# Patient Record
Sex: FEMALE | Race: BLACK OR AFRICAN AMERICAN | NOT HISPANIC OR LATINO
[De-identification: names, ages, dates, MRNs, and addresses within clinical notes are randomized per-mention and may not be internally consistent; named-entity substitution may affect disease eponyms.]

---

## 2017-01-03 ENCOUNTER — APPOINTMENT (OUTPATIENT)
Dept: SURGERY | Facility: CLINIC | Age: 62
End: 2017-01-03

## 2017-01-03 VITALS
TEMPERATURE: 98 F | HEART RATE: 72 BPM | OXYGEN SATURATION: 97 % | SYSTOLIC BLOOD PRESSURE: 132 MMHG | RESPIRATION RATE: 15 BRPM | DIASTOLIC BLOOD PRESSURE: 82 MMHG

## 2017-01-03 DIAGNOSIS — K80.20 CALCULUS OF GALLBLADDER W/OUT CHOLECYSTITIS W/OUT OBSTRUCTION: ICD-10-CM

## 2017-01-03 DIAGNOSIS — Z90.49 ACQUIRED ABSENCE OF OTHER SPECIFIED PARTS OF DIGESTIVE TRACT: ICD-10-CM

## 2017-01-13 ENCOUNTER — APPOINTMENT (OUTPATIENT)
Dept: SURGERY | Facility: CLINIC | Age: 62
End: 2017-01-13

## 2017-01-13 VITALS
TEMPERATURE: 98.4 F | SYSTOLIC BLOOD PRESSURE: 144 MMHG | RESPIRATION RATE: 16 BRPM | HEART RATE: 72 BPM | OXYGEN SATURATION: 97 % | DIASTOLIC BLOOD PRESSURE: 87 MMHG

## 2017-01-13 DIAGNOSIS — Z87.19 PERSONAL HISTORY OF OTHER DISEASES OF THE DIGESTIVE SYSTEM: ICD-10-CM

## 2017-01-13 RX ORDER — OXYCODONE AND ACETAMINOPHEN 10; 325 MG/1; MG/1
10-325 TABLET ORAL
Refills: 0 | Status: DISCONTINUED | COMMUNITY
End: 2017-01-13

## 2017-01-13 RX ORDER — AMOXICILLIN AND CLAVULANATE POTASSIUM 875; 125 MG/1; 1/1
875-125 TABLET, FILM COATED ORAL
Refills: 0 | Status: DISCONTINUED | COMMUNITY
End: 2017-01-13

## 2017-01-20 ENCOUNTER — CHART COPY (OUTPATIENT)
Age: 62
End: 2017-01-20

## 2017-01-23 ENCOUNTER — APPOINTMENT (OUTPATIENT)
Dept: MRI IMAGING | Facility: CLINIC | Age: 62
End: 2017-01-23

## 2017-01-23 ENCOUNTER — CHART COPY (OUTPATIENT)
Age: 62
End: 2017-01-23

## 2017-01-25 ENCOUNTER — OUTPATIENT (OUTPATIENT)
Dept: OUTPATIENT SERVICES | Facility: HOSPITAL | Age: 62
LOS: 1 days | End: 2017-01-25
Payer: COMMERCIAL

## 2017-01-25 ENCOUNTER — APPOINTMENT (OUTPATIENT)
Dept: MRI IMAGING | Facility: CLINIC | Age: 62
End: 2017-01-25

## 2017-01-25 DIAGNOSIS — Z98.2 PRESENCE OF CEREBROSPINAL FLUID DRAINAGE DEVICE: Chronic | ICD-10-CM

## 2017-01-25 DIAGNOSIS — Z98.890 OTHER SPECIFIED POSTPROCEDURAL STATES: Chronic | ICD-10-CM

## 2017-01-25 DIAGNOSIS — C23 MALIGNANT NEOPLASM OF GALLBLADDER: ICD-10-CM

## 2017-01-25 PROCEDURE — 70250 X-RAY EXAM OF SKULL: CPT

## 2017-01-25 PROCEDURE — 82565 ASSAY OF CREATININE: CPT

## 2017-01-25 PROCEDURE — 74183 MRI ABD W/O CNTR FLWD CNTR: CPT

## 2017-01-25 PROCEDURE — A9585: CPT

## 2017-01-26 ENCOUNTER — APPOINTMENT (OUTPATIENT)
Dept: SURGERY | Facility: CLINIC | Age: 62
End: 2017-01-26

## 2017-01-31 ENCOUNTER — APPOINTMENT (OUTPATIENT)
Dept: SURGERY | Facility: CLINIC | Age: 62
End: 2017-01-31

## 2017-01-31 VITALS
DIASTOLIC BLOOD PRESSURE: 90 MMHG | SYSTOLIC BLOOD PRESSURE: 159 MMHG | HEART RATE: 69 BPM | BODY MASS INDEX: 33.66 KG/M2 | TEMPERATURE: 98 F | HEIGHT: 63 IN | WEIGHT: 190 LBS

## 2017-02-02 ENCOUNTER — OUTPATIENT (OUTPATIENT)
Dept: OUTPATIENT SERVICES | Facility: HOSPITAL | Age: 62
LOS: 1 days | End: 2017-02-02
Payer: COMMERCIAL

## 2017-02-02 VITALS
SYSTOLIC BLOOD PRESSURE: 138 MMHG | WEIGHT: 195.99 LBS | DIASTOLIC BLOOD PRESSURE: 95 MMHG | OXYGEN SATURATION: 98 % | TEMPERATURE: 98 F | HEART RATE: 69 BPM | HEIGHT: 63 IN | RESPIRATION RATE: 14 BRPM

## 2017-02-02 DIAGNOSIS — Z98.2 PRESENCE OF CEREBROSPINAL FLUID DRAINAGE DEVICE: Chronic | ICD-10-CM

## 2017-02-02 DIAGNOSIS — C23 MALIGNANT NEOPLASM OF GALLBLADDER: ICD-10-CM

## 2017-02-02 DIAGNOSIS — Z90.49 ACQUIRED ABSENCE OF OTHER SPECIFIED PARTS OF DIGESTIVE TRACT: Chronic | ICD-10-CM

## 2017-02-02 DIAGNOSIS — Z01.818 ENCOUNTER FOR OTHER PREPROCEDURAL EXAMINATION: ICD-10-CM

## 2017-02-02 DIAGNOSIS — Z98.890 OTHER SPECIFIED POSTPROCEDURAL STATES: Chronic | ICD-10-CM

## 2017-02-02 LAB
BLD GP AB SCN SERPL QL: NEGATIVE — SIGNIFICANT CHANGE UP
HCT VFR BLD CALC: 41.4 % — SIGNIFICANT CHANGE UP (ref 34.5–45)
HGB BLD-MCNC: 13.7 G/DL — SIGNIFICANT CHANGE UP (ref 11.5–15.5)
MCHC RBC-ENTMCNC: 30.2 PG — SIGNIFICANT CHANGE UP (ref 27–34)
MCHC RBC-ENTMCNC: 33.1 GM/DL — SIGNIFICANT CHANGE UP (ref 32–36)
MCV RBC AUTO: 91.4 FL — SIGNIFICANT CHANGE UP (ref 80–100)
PLATELET # BLD AUTO: 383 K/UL — SIGNIFICANT CHANGE UP (ref 150–400)
RBC # BLD: 4.53 M/UL — SIGNIFICANT CHANGE UP (ref 3.8–5.2)
RBC # FLD: 14.2 % — SIGNIFICANT CHANGE UP (ref 10.3–14.5)
RH IG SCN BLD-IMP: POSITIVE — SIGNIFICANT CHANGE UP
WBC # BLD: 5.94 K/UL — SIGNIFICANT CHANGE UP (ref 3.8–10.5)
WBC # FLD AUTO: 5.94 K/UL — SIGNIFICANT CHANGE UP (ref 3.8–10.5)

## 2017-02-02 PROCEDURE — G0463: CPT

## 2017-02-02 PROCEDURE — 86850 RBC ANTIBODY SCREEN: CPT

## 2017-02-02 PROCEDURE — 80048 BASIC METABOLIC PNL TOTAL CA: CPT

## 2017-02-02 PROCEDURE — 86901 BLOOD TYPING SEROLOGIC RH(D): CPT

## 2017-02-02 PROCEDURE — 85027 COMPLETE CBC AUTOMATED: CPT

## 2017-02-02 PROCEDURE — 86900 BLOOD TYPING SEROLOGIC ABO: CPT

## 2017-02-02 RX ORDER — CEFOTETAN DISODIUM 1 G
2 VIAL (EA) INJECTION ONCE
Qty: 0 | Refills: 0 | Status: DISCONTINUED | OUTPATIENT
Start: 2017-02-06 | End: 2017-02-06

## 2017-02-02 NOTE — H&P PST ADULT - PMH
Cerebral aneurysm  ' 2009:  surgically treated  Gallstones  dx: ' 2015  History of osteoarthritis    Hypercholesterolemia  Borderline. Diet-managed  Hypertension    Multiparity    Vitamin D deficiency Cerebral aneurysm  ' 2009:  surgically treated  Gallstones  dx: ' 2015  History of osteoarthritis    Hypercholesterolemia  Borderline. Diet-managed  Hypertension    Malignant neoplasm of gallbladder    Multiparity    Vitamin D deficiency

## 2017-02-02 NOTE — H&P PST ADULT - NSANTHOSAYNRD_GEN_A_CORE
No. JUAN screening performed.  STOP BANG Legend: 0-2 = LOW Risk; 3-4 = INTERMEDIATE Risk; 5-8 = HIGH Risk

## 2017-02-02 NOTE — H&P PST ADULT - HISTORY OF PRESENT ILLNESS
This is a 62 y/o female with PMH:  HTN/ borderline Hypercholesterolemia/ s/p ('2009): Clipping of Cerebral Aneurysm with V-P Shunt Placement. No Neuro deficits. Now Dx: + Gallstones. Scheduled: Laproscopic Cholycystectomy.

## 2017-02-02 NOTE — H&P PST ADULT - PSH
S/P cerebral aneurysm operation  ' 2009:  Clipping of Cerebral Aneurysm  S/P ventricular shunt placement  ' 2009 History of cholecystectomy  12/16  S/P cerebral aneurysm operation  ' 2009:  Clipping of Cerebral Aneurysm  S/P ventricular shunt placement  ' 2009

## 2017-02-03 LAB
ANION GAP SERPL CALC-SCNC: 19 MMOL/L — HIGH (ref 5–17)
BUN SERPL-MCNC: 11 MG/DL — SIGNIFICANT CHANGE UP (ref 7–23)
CALCIUM SERPL-MCNC: 9.7 MG/DL — SIGNIFICANT CHANGE UP (ref 8.4–10.5)
CHLORIDE SERPL-SCNC: 101 MMOL/L — SIGNIFICANT CHANGE UP (ref 96–108)
CO2 SERPL-SCNC: 24 MMOL/L — SIGNIFICANT CHANGE UP (ref 22–31)
CREAT SERPL-MCNC: 0.82 MG/DL — SIGNIFICANT CHANGE UP (ref 0.5–1.3)
GLUCOSE SERPL-MCNC: 81 MG/DL — SIGNIFICANT CHANGE UP (ref 70–99)
POTASSIUM SERPL-MCNC: 3.9 MMOL/L — SIGNIFICANT CHANGE UP (ref 3.5–5.3)
POTASSIUM SERPL-SCNC: 3.9 MMOL/L — SIGNIFICANT CHANGE UP (ref 3.5–5.3)
SODIUM SERPL-SCNC: 144 MMOL/L — SIGNIFICANT CHANGE UP (ref 135–145)

## 2017-02-05 ENCOUNTER — RESULT REVIEW (OUTPATIENT)
Age: 62
End: 2017-02-05

## 2017-02-06 ENCOUNTER — INPATIENT (INPATIENT)
Facility: HOSPITAL | Age: 62
LOS: 3 days | Discharge: HOME | DRG: 406 | End: 2017-02-10
Attending: TRANSPLANT SURGERY | Admitting: TRANSPLANT SURGERY
Payer: COMMERCIAL

## 2017-02-06 ENCOUNTER — APPOINTMENT (OUTPATIENT)
Dept: SURGICAL ONCOLOGY | Facility: HOSPITAL | Age: 62
End: 2017-02-06

## 2017-02-06 ENCOUNTER — APPOINTMENT (OUTPATIENT)
Dept: SURGERY | Facility: HOSPITAL | Age: 62
End: 2017-02-06

## 2017-02-06 ENCOUNTER — TRANSCRIPTION ENCOUNTER (OUTPATIENT)
Age: 62
End: 2017-02-06

## 2017-02-06 VITALS
RESPIRATION RATE: 18 BRPM | TEMPERATURE: 98 F | OXYGEN SATURATION: 96 % | HEART RATE: 82 BPM | DIASTOLIC BLOOD PRESSURE: 83 MMHG | SYSTOLIC BLOOD PRESSURE: 144 MMHG

## 2017-02-06 DIAGNOSIS — Z98.890 OTHER SPECIFIED POSTPROCEDURAL STATES: Chronic | ICD-10-CM

## 2017-02-06 DIAGNOSIS — Z98.2 PRESENCE OF CEREBROSPINAL FLUID DRAINAGE DEVICE: Chronic | ICD-10-CM

## 2017-02-06 DIAGNOSIS — Z90.49 ACQUIRED ABSENCE OF OTHER SPECIFIED PARTS OF DIGESTIVE TRACT: Chronic | ICD-10-CM

## 2017-02-06 DIAGNOSIS — C23 MALIGNANT NEOPLASM OF GALLBLADDER: ICD-10-CM

## 2017-02-06 LAB
ALBUMIN SERPL ELPH-MCNC: 3.8 G/DL — SIGNIFICANT CHANGE UP (ref 3.3–5)
ALBUMIN SERPL ELPH-MCNC: 4.1 G/DL — SIGNIFICANT CHANGE UP (ref 3.3–5)
ALP SERPL-CCNC: 68 U/L — SIGNIFICANT CHANGE UP (ref 40–120)
ALP SERPL-CCNC: 73 U/L — SIGNIFICANT CHANGE UP (ref 40–120)
ALT FLD-CCNC: 17 U/L RC — SIGNIFICANT CHANGE UP (ref 10–45)
ALT FLD-CCNC: 193 U/L RC — HIGH (ref 10–45)
ANION GAP SERPL CALC-SCNC: 14 MMOL/L — SIGNIFICANT CHANGE UP (ref 5–17)
APTT BLD: 25.1 SEC — LOW (ref 27.5–37.4)
APTT BLD: 30.9 SEC — SIGNIFICANT CHANGE UP (ref 27.5–37.4)
AST SERPL-CCNC: 24 U/L — SIGNIFICANT CHANGE UP (ref 10–40)
AST SERPL-CCNC: 257 U/L — HIGH (ref 10–40)
BILIRUB DIRECT SERPL-MCNC: 0.1 MG/DL — SIGNIFICANT CHANGE UP (ref 0–0.2)
BILIRUB INDIRECT FLD-MCNC: 0.2 MG/DL — SIGNIFICANT CHANGE UP (ref 0.2–1)
BILIRUB SERPL-MCNC: 0.3 MG/DL — SIGNIFICANT CHANGE UP (ref 0.2–1.2)
BILIRUB SERPL-MCNC: 0.6 MG/DL — SIGNIFICANT CHANGE UP (ref 0.2–1.2)
BUN SERPL-MCNC: 15 MG/DL — SIGNIFICANT CHANGE UP (ref 7–23)
CALCIUM SERPL-MCNC: 8.6 MG/DL — SIGNIFICANT CHANGE UP (ref 8.4–10.5)
CHLORIDE SERPL-SCNC: 103 MMOL/L — SIGNIFICANT CHANGE UP (ref 96–108)
CO2 SERPL-SCNC: 25 MMOL/L — SIGNIFICANT CHANGE UP (ref 22–31)
CREAT SERPL-MCNC: 0.72 MG/DL — SIGNIFICANT CHANGE UP (ref 0.5–1.3)
GAS PNL BLDA: SIGNIFICANT CHANGE UP
GLUCOSE SERPL-MCNC: 178 MG/DL — HIGH (ref 70–99)
HCT VFR BLD CALC: 37.6 % — SIGNIFICANT CHANGE UP (ref 34.5–45)
HGB BLD-MCNC: 13.2 G/DL — SIGNIFICANT CHANGE UP (ref 11.5–15.5)
INR BLD: 0.96 RATIO — SIGNIFICANT CHANGE UP (ref 0.88–1.16)
INR BLD: 1.06 RATIO — SIGNIFICANT CHANGE UP (ref 0.88–1.16)
MAGNESIUM SERPL-MCNC: 2.2 MG/DL — SIGNIFICANT CHANGE UP (ref 1.6–2.6)
MCHC RBC-ENTMCNC: 32.2 PG — SIGNIFICANT CHANGE UP (ref 27–34)
MCHC RBC-ENTMCNC: 35 GM/DL — SIGNIFICANT CHANGE UP (ref 32–36)
MCV RBC AUTO: 92.1 FL — SIGNIFICANT CHANGE UP (ref 80–100)
PHOSPHATE SERPL-MCNC: 3.4 MG/DL — SIGNIFICANT CHANGE UP (ref 2.5–4.5)
PLATELET # BLD AUTO: 364 K/UL — SIGNIFICANT CHANGE UP (ref 150–400)
POTASSIUM SERPL-MCNC: 3.5 MMOL/L — SIGNIFICANT CHANGE UP (ref 3.5–5.3)
POTASSIUM SERPL-SCNC: 3.5 MMOL/L — SIGNIFICANT CHANGE UP (ref 3.5–5.3)
PROT SERPL-MCNC: 6.6 G/DL — SIGNIFICANT CHANGE UP (ref 6–8.3)
PROT SERPL-MCNC: 7.3 G/DL — SIGNIFICANT CHANGE UP (ref 6–8.3)
PROTHROM AB SERPL-ACNC: 10.4 SEC — SIGNIFICANT CHANGE UP (ref 10–13.1)
PROTHROM AB SERPL-ACNC: 11.6 SEC — SIGNIFICANT CHANGE UP (ref 10–13.1)
RBC # BLD: 4.08 M/UL — SIGNIFICANT CHANGE UP (ref 3.8–5.2)
RBC # FLD: 12.4 % — SIGNIFICANT CHANGE UP (ref 10.3–14.5)
RH IG SCN BLD-IMP: POSITIVE — SIGNIFICANT CHANGE UP
SODIUM SERPL-SCNC: 142 MMOL/L — SIGNIFICANT CHANGE UP (ref 135–145)
WBC # BLD: 15.6 K/UL — HIGH (ref 3.8–10.5)
WBC # FLD AUTO: 15.6 K/UL — HIGH (ref 3.8–10.5)

## 2017-02-06 PROCEDURE — 38747 REMOVE ABDOMINAL LYMPH NODES: CPT | Mod: 82,59

## 2017-02-06 PROCEDURE — 71010: CPT | Mod: 26

## 2017-02-06 PROCEDURE — 47120 PARTIAL REMOVAL OF LIVER: CPT | Mod: 82

## 2017-02-06 PROCEDURE — 99223 1ST HOSP IP/OBS HIGH 75: CPT | Mod: 25

## 2017-02-06 PROCEDURE — 88307 TISSUE EXAM BY PATHOLOGIST: CPT | Mod: 26

## 2017-02-06 PROCEDURE — 88313 SPECIAL STAINS GROUP 2: CPT | Mod: 26

## 2017-02-06 PROCEDURE — 99232 SBSQ HOSP IP/OBS MODERATE 35: CPT

## 2017-02-06 PROCEDURE — 44050 REDUCE BOWEL OBSTRUCTION: CPT | Mod: 82

## 2017-02-06 PROCEDURE — 88309 TISSUE EXAM BY PATHOLOGIST: CPT | Mod: 26

## 2017-02-06 RX ORDER — CEFOTETAN DISODIUM 1 G
2 VIAL (EA) INJECTION EVERY 12 HOURS
Qty: 0 | Refills: 0 | Status: DISCONTINUED | OUTPATIENT
Start: 2017-02-06 | End: 2017-02-06

## 2017-02-06 RX ORDER — HYDROMORPHONE HYDROCHLORIDE 2 MG/ML
30 INJECTION INTRAMUSCULAR; INTRAVENOUS; SUBCUTANEOUS
Qty: 0 | Refills: 0 | Status: DISCONTINUED | OUTPATIENT
Start: 2017-02-06 | End: 2017-02-09

## 2017-02-06 RX ORDER — ERTAPENEM SODIUM 1 G/1
INJECTION, POWDER, LYOPHILIZED, FOR SOLUTION INTRAMUSCULAR; INTRAVENOUS
Qty: 0 | Refills: 0 | Status: COMPLETED | OUTPATIENT
Start: 2017-02-06 | End: 2017-02-08

## 2017-02-06 RX ORDER — ONDANSETRON 8 MG/1
4 TABLET, FILM COATED ORAL EVERY 6 HOURS
Qty: 0 | Refills: 0 | Status: DISCONTINUED | OUTPATIENT
Start: 2017-02-06 | End: 2017-02-10

## 2017-02-06 RX ORDER — SODIUM CHLORIDE 9 MG/ML
1000 INJECTION, SOLUTION INTRAVENOUS
Qty: 0 | Refills: 0 | Status: DISCONTINUED | OUTPATIENT
Start: 2017-02-06 | End: 2017-02-07

## 2017-02-06 RX ORDER — HYDROMORPHONE HYDROCHLORIDE 2 MG/ML
0.5 INJECTION INTRAMUSCULAR; INTRAVENOUS; SUBCUTANEOUS
Qty: 0 | Refills: 0 | Status: DISCONTINUED | OUTPATIENT
Start: 2017-02-06 | End: 2017-02-09

## 2017-02-06 RX ORDER — ERTAPENEM SODIUM 1 G/1
1000 INJECTION, POWDER, LYOPHILIZED, FOR SOLUTION INTRAMUSCULAR; INTRAVENOUS EVERY 24 HOURS
Qty: 0 | Refills: 0 | Status: COMPLETED | OUTPATIENT
Start: 2017-02-07 | End: 2017-02-08

## 2017-02-06 RX ORDER — ENOXAPARIN SODIUM 100 MG/ML
40 INJECTION SUBCUTANEOUS EVERY 24 HOURS
Qty: 0 | Refills: 0 | Status: DISCONTINUED | OUTPATIENT
Start: 2017-02-06 | End: 2017-02-10

## 2017-02-06 RX ORDER — NALOXONE HYDROCHLORIDE 4 MG/.1ML
0.1 SPRAY NASAL
Qty: 0 | Refills: 0 | Status: DISCONTINUED | OUTPATIENT
Start: 2017-02-06 | End: 2017-02-09

## 2017-02-06 RX ORDER — SODIUM CHLORIDE 9 MG/ML
3 INJECTION INTRAMUSCULAR; INTRAVENOUS; SUBCUTANEOUS EVERY 8 HOURS
Qty: 0 | Refills: 0 | Status: DISCONTINUED | OUTPATIENT
Start: 2017-02-06 | End: 2017-02-06

## 2017-02-06 RX ORDER — ERTAPENEM SODIUM 1 G/1
1000 INJECTION, POWDER, LYOPHILIZED, FOR SOLUTION INTRAMUSCULAR; INTRAVENOUS ONCE
Qty: 0 | Refills: 0 | Status: COMPLETED | OUTPATIENT
Start: 2017-02-06 | End: 2017-02-06

## 2017-02-06 RX ORDER — HYDROMORPHONE HYDROCHLORIDE 2 MG/ML
0.5 INJECTION INTRAMUSCULAR; INTRAVENOUS; SUBCUTANEOUS ONCE
Qty: 0 | Refills: 0 | Status: DISCONTINUED | OUTPATIENT
Start: 2017-02-06 | End: 2017-02-06

## 2017-02-06 RX ADMIN — HYDROMORPHONE HYDROCHLORIDE 0.5 MILLIGRAM(S): 2 INJECTION INTRAMUSCULAR; INTRAVENOUS; SUBCUTANEOUS at 14:45

## 2017-02-06 RX ADMIN — HYDROMORPHONE HYDROCHLORIDE 30 MILLILITER(S): 2 INJECTION INTRAMUSCULAR; INTRAVENOUS; SUBCUTANEOUS at 18:12

## 2017-02-06 RX ADMIN — HYDROMORPHONE HYDROCHLORIDE 30 MILLILITER(S): 2 INJECTION INTRAMUSCULAR; INTRAVENOUS; SUBCUTANEOUS at 19:18

## 2017-02-06 RX ADMIN — ENOXAPARIN SODIUM 40 MILLIGRAM(S): 100 INJECTION SUBCUTANEOUS at 18:39

## 2017-02-06 RX ADMIN — ERTAPENEM SODIUM 120 MILLIGRAM(S): 1 INJECTION, POWDER, LYOPHILIZED, FOR SOLUTION INTRAMUSCULAR; INTRAVENOUS at 16:35

## 2017-02-06 RX ADMIN — HYDROMORPHONE HYDROCHLORIDE 0.5 MILLIGRAM(S): 2 INJECTION INTRAMUSCULAR; INTRAVENOUS; SUBCUTANEOUS at 17:18

## 2017-02-06 NOTE — PATIENT PROFILE ADULT. - PSH
History of cholecystectomy  12/16  S/P cerebral aneurysm operation  ' 2009:  Clipping of Cerebral Aneurysm  S/P ventricular shunt placement  ' 2009

## 2017-02-06 NOTE — PATIENT PROFILE ADULT. - PMH
Cerebral aneurysm  ' 2009:  surgically treated  Gallstones  dx: ' 2015  History of osteoarthritis    Hypercholesterolemia  Borderline. Diet-managed  Hypertension    Malignant neoplasm of gallbladder    Multiparity    Vitamin D deficiency

## 2017-02-06 NOTE — BRIEF OPERATIVE NOTE - OPERATION/FINDINGS
s/p exploratory laparotomy, segment 4 and 5 resection, portal lymphadenectomy     shunt was encounter while opening, it was repositioned at the end of the case. no gross spillage of bile, no signs of active infection

## 2017-02-06 NOTE — PRE-OP CHECKLIST - 2.
pre-op instruction and orientation to procedure provided
pre-op instruction and orientation to procedure provided

## 2017-02-07 LAB
ALBUMIN SERPL ELPH-MCNC: 3.6 G/DL — SIGNIFICANT CHANGE UP (ref 3.3–5)
ALP SERPL-CCNC: 59 U/L — SIGNIFICANT CHANGE UP (ref 40–120)
ALT FLD-CCNC: 221 U/L RC — HIGH (ref 10–45)
ANION GAP SERPL CALC-SCNC: 13 MMOL/L — SIGNIFICANT CHANGE UP (ref 5–17)
APTT BLD: 28.9 SEC — SIGNIFICANT CHANGE UP (ref 27.5–37.4)
AST SERPL-CCNC: 244 U/L — HIGH (ref 10–40)
BILIRUB DIRECT SERPL-MCNC: 0.1 MG/DL — SIGNIFICANT CHANGE UP (ref 0–0.2)
BILIRUB INDIRECT FLD-MCNC: 0.3 MG/DL — SIGNIFICANT CHANGE UP (ref 0.2–1)
BILIRUB SERPL-MCNC: 0.4 MG/DL — SIGNIFICANT CHANGE UP (ref 0.2–1.2)
BUN SERPL-MCNC: 13 MG/DL — SIGNIFICANT CHANGE UP (ref 7–23)
CALCIUM SERPL-MCNC: 8.8 MG/DL — SIGNIFICANT CHANGE UP (ref 8.4–10.5)
CHLORIDE SERPL-SCNC: 105 MMOL/L — SIGNIFICANT CHANGE UP (ref 96–108)
CO2 SERPL-SCNC: 25 MMOL/L — SIGNIFICANT CHANGE UP (ref 22–31)
CREAT SERPL-MCNC: 0.5 MG/DL — SIGNIFICANT CHANGE UP (ref 0.5–1.3)
GAS PNL BLDA: SIGNIFICANT CHANGE UP
GLUCOSE SERPL-MCNC: 142 MG/DL — HIGH (ref 70–99)
HCT VFR BLD CALC: 37 % — SIGNIFICANT CHANGE UP (ref 34.5–45)
HGB BLD-MCNC: 12.3 G/DL — SIGNIFICANT CHANGE UP (ref 11.5–15.5)
INR BLD: 1.1 RATIO — SIGNIFICANT CHANGE UP (ref 0.88–1.16)
MAGNESIUM SERPL-MCNC: 2.3 MG/DL — SIGNIFICANT CHANGE UP (ref 1.6–2.6)
MCHC RBC-ENTMCNC: 31.2 PG — SIGNIFICANT CHANGE UP (ref 27–34)
MCHC RBC-ENTMCNC: 33.3 GM/DL — SIGNIFICANT CHANGE UP (ref 32–36)
MCV RBC AUTO: 93.5 FL — SIGNIFICANT CHANGE UP (ref 80–100)
PHOSPHATE SERPL-MCNC: 3.6 MG/DL — SIGNIFICANT CHANGE UP (ref 2.5–4.5)
PLATELET # BLD AUTO: 293 K/UL — SIGNIFICANT CHANGE UP (ref 150–400)
POTASSIUM SERPL-MCNC: 4.1 MMOL/L — SIGNIFICANT CHANGE UP (ref 3.5–5.3)
POTASSIUM SERPL-SCNC: 4.1 MMOL/L — SIGNIFICANT CHANGE UP (ref 3.5–5.3)
PROT SERPL-MCNC: 6.4 G/DL — SIGNIFICANT CHANGE UP (ref 6–8.3)
PROTHROM AB SERPL-ACNC: 12 SEC — SIGNIFICANT CHANGE UP (ref 10–13.1)
RBC # BLD: 3.96 M/UL — SIGNIFICANT CHANGE UP (ref 3.8–5.2)
RBC # FLD: 12.5 % — SIGNIFICANT CHANGE UP (ref 10.3–14.5)
SODIUM SERPL-SCNC: 143 MMOL/L — SIGNIFICANT CHANGE UP (ref 135–145)
WBC # BLD: 14.5 K/UL — HIGH (ref 3.8–10.5)
WBC # FLD AUTO: 14.5 K/UL — HIGH (ref 3.8–10.5)

## 2017-02-07 PROCEDURE — 71010: CPT | Mod: 26

## 2017-02-07 PROCEDURE — 99232 SBSQ HOSP IP/OBS MODERATE 35: CPT

## 2017-02-07 RX ORDER — SODIUM CHLORIDE 9 MG/ML
1000 INJECTION, SOLUTION INTRAVENOUS
Qty: 0 | Refills: 0 | Status: DISCONTINUED | OUTPATIENT
Start: 2017-02-07 | End: 2017-02-08

## 2017-02-07 RX ORDER — SODIUM CHLORIDE 9 MG/ML
1000 INJECTION, SOLUTION INTRAVENOUS
Qty: 0 | Refills: 0 | Status: DISCONTINUED | OUTPATIENT
Start: 2017-02-07 | End: 2017-02-07

## 2017-02-07 RX ADMIN — ENOXAPARIN SODIUM 40 MILLIGRAM(S): 100 INJECTION SUBCUTANEOUS at 17:46

## 2017-02-07 RX ADMIN — ERTAPENEM SODIUM 120 MILLIGRAM(S): 1 INJECTION, POWDER, LYOPHILIZED, FOR SOLUTION INTRAMUSCULAR; INTRAVENOUS at 15:31

## 2017-02-07 RX ADMIN — HYDROMORPHONE HYDROCHLORIDE 30 MILLILITER(S): 2 INJECTION INTRAMUSCULAR; INTRAVENOUS; SUBCUTANEOUS at 07:15

## 2017-02-07 RX ADMIN — HYDROMORPHONE HYDROCHLORIDE 30 MILLILITER(S): 2 INJECTION INTRAMUSCULAR; INTRAVENOUS; SUBCUTANEOUS at 19:04

## 2017-02-08 LAB
ANION GAP SERPL CALC-SCNC: 12 MMOL/L — SIGNIFICANT CHANGE UP (ref 5–17)
BUN SERPL-MCNC: 7 MG/DL — SIGNIFICANT CHANGE UP (ref 7–23)
CALCIUM SERPL-MCNC: 8.5 MG/DL — SIGNIFICANT CHANGE UP (ref 8.4–10.5)
CHLORIDE SERPL-SCNC: 99 MMOL/L — SIGNIFICANT CHANGE UP (ref 96–108)
CO2 SERPL-SCNC: 29 MMOL/L — SIGNIFICANT CHANGE UP (ref 22–31)
CREAT SERPL-MCNC: 0.52 MG/DL — SIGNIFICANT CHANGE UP (ref 0.5–1.3)
GLUCOSE SERPL-MCNC: 118 MG/DL — HIGH (ref 70–99)
HCT VFR BLD CALC: 36.3 % — SIGNIFICANT CHANGE UP (ref 34.5–45)
HGB BLD-MCNC: 12 G/DL — SIGNIFICANT CHANGE UP (ref 11.5–15.5)
MAGNESIUM SERPL-MCNC: 2.4 MG/DL — SIGNIFICANT CHANGE UP (ref 1.6–2.6)
MCHC RBC-ENTMCNC: 30.6 PG — SIGNIFICANT CHANGE UP (ref 27–34)
MCHC RBC-ENTMCNC: 33.1 GM/DL — SIGNIFICANT CHANGE UP (ref 32–36)
MCV RBC AUTO: 92.2 FL — SIGNIFICANT CHANGE UP (ref 80–100)
PHOSPHATE SERPL-MCNC: 1.9 MG/DL — LOW (ref 2.5–4.5)
PLATELET # BLD AUTO: 266 K/UL — SIGNIFICANT CHANGE UP (ref 150–400)
POTASSIUM SERPL-MCNC: 3.7 MMOL/L — SIGNIFICANT CHANGE UP (ref 3.5–5.3)
POTASSIUM SERPL-SCNC: 3.7 MMOL/L — SIGNIFICANT CHANGE UP (ref 3.5–5.3)
RBC # BLD: 3.94 M/UL — SIGNIFICANT CHANGE UP (ref 3.8–5.2)
RBC # FLD: 12.3 % — SIGNIFICANT CHANGE UP (ref 10.3–14.5)
SODIUM SERPL-SCNC: 140 MMOL/L — SIGNIFICANT CHANGE UP (ref 135–145)
WBC # BLD: 11 K/UL — HIGH (ref 3.8–10.5)
WBC # FLD AUTO: 11 K/UL — HIGH (ref 3.8–10.5)

## 2017-02-08 PROCEDURE — 99232 SBSQ HOSP IP/OBS MODERATE 35: CPT

## 2017-02-08 RX ORDER — POTASSIUM CHLORIDE 20 MEQ
40 PACKET (EA) ORAL ONCE
Qty: 0 | Refills: 0 | Status: COMPLETED | OUTPATIENT
Start: 2017-02-08 | End: 2017-02-08

## 2017-02-08 RX ORDER — POTASSIUM PHOSPHATE, MONOBASIC POTASSIUM PHOSPHATE, DIBASIC 236; 224 MG/ML; MG/ML
15 INJECTION, SOLUTION INTRAVENOUS ONCE
Qty: 0 | Refills: 0 | Status: DISCONTINUED | OUTPATIENT
Start: 2017-02-08 | End: 2017-02-08

## 2017-02-08 RX ORDER — SODIUM CHLORIDE 9 MG/ML
1000 INJECTION, SOLUTION INTRAVENOUS
Qty: 0 | Refills: 0 | Status: DISCONTINUED | OUTPATIENT
Start: 2017-02-08 | End: 2017-02-09

## 2017-02-08 RX ADMIN — Medication 40 MILLIEQUIVALENT(S): at 21:29

## 2017-02-08 RX ADMIN — HYDROMORPHONE HYDROCHLORIDE 30 MILLILITER(S): 2 INJECTION INTRAMUSCULAR; INTRAVENOUS; SUBCUTANEOUS at 19:30

## 2017-02-08 RX ADMIN — Medication 255 MILLIMOLE(S): at 18:33

## 2017-02-08 RX ADMIN — ENOXAPARIN SODIUM 40 MILLIGRAM(S): 100 INJECTION SUBCUTANEOUS at 18:34

## 2017-02-08 RX ADMIN — HYDROMORPHONE HYDROCHLORIDE 30 MILLILITER(S): 2 INJECTION INTRAMUSCULAR; INTRAVENOUS; SUBCUTANEOUS at 09:00

## 2017-02-08 RX ADMIN — ERTAPENEM SODIUM 120 MILLIGRAM(S): 1 INJECTION, POWDER, LYOPHILIZED, FOR SOLUTION INTRAMUSCULAR; INTRAVENOUS at 14:24

## 2017-02-08 NOTE — PROVIDER CONTACT NOTE (OTHER) - ASSESSMENT
Attempted to draw blood work twice unsuccessfully, Pt refusing third attempt. Pt. educated on necessity of blood work, verbalizes understanding, still refusing at this time.

## 2017-02-09 LAB
ALBUMIN SERPL ELPH-MCNC: 3 G/DL — LOW (ref 3.3–5)
ALP SERPL-CCNC: 66 U/L — SIGNIFICANT CHANGE UP (ref 40–120)
ALT FLD-CCNC: 163 U/L — HIGH (ref 10–45)
ANION GAP SERPL CALC-SCNC: 11 MMOL/L — SIGNIFICANT CHANGE UP (ref 5–17)
APTT BLD: 30 SEC — SIGNIFICANT CHANGE UP (ref 27.5–37.4)
AST SERPL-CCNC: 83 U/L — HIGH (ref 10–40)
BILIRUB DIRECT SERPL-MCNC: 0.1 MG/DL — SIGNIFICANT CHANGE UP (ref 0–0.2)
BILIRUB INDIRECT FLD-MCNC: 0.5 MG/DL — SIGNIFICANT CHANGE UP (ref 0.2–1)
BILIRUB SERPL-MCNC: 0.6 MG/DL — SIGNIFICANT CHANGE UP (ref 0.2–1.2)
BUN SERPL-MCNC: 7 MG/DL — SIGNIFICANT CHANGE UP (ref 7–23)
CA-I BLD-SCNC: 1.17 MMOL/L — SIGNIFICANT CHANGE UP (ref 1.05–1.34)
CALCIUM SERPL-MCNC: 8.4 MG/DL — SIGNIFICANT CHANGE UP (ref 8.4–10.5)
CHLORIDE SERPL-SCNC: 99 MMOL/L — SIGNIFICANT CHANGE UP (ref 96–108)
CO2 SERPL-SCNC: 29 MMOL/L — SIGNIFICANT CHANGE UP (ref 22–31)
CREAT SERPL-MCNC: 0.51 MG/DL — SIGNIFICANT CHANGE UP (ref 0.5–1.3)
GLUCOSE SERPL-MCNC: 117 MG/DL — HIGH (ref 70–99)
HCT VFR BLD CALC: 35.2 % — SIGNIFICANT CHANGE UP (ref 34.5–45)
HGB BLD-MCNC: 11.6 G/DL — SIGNIFICANT CHANGE UP (ref 11.5–15.5)
INR BLD: 1.15 RATIO — SIGNIFICANT CHANGE UP (ref 0.88–1.16)
MAGNESIUM SERPL-MCNC: 2.1 MG/DL — SIGNIFICANT CHANGE UP (ref 1.6–2.6)
MCHC RBC-ENTMCNC: 30.3 PG — SIGNIFICANT CHANGE UP (ref 27–34)
MCHC RBC-ENTMCNC: 33 GM/DL — SIGNIFICANT CHANGE UP (ref 32–36)
MCV RBC AUTO: 91.9 FL — SIGNIFICANT CHANGE UP (ref 80–100)
PHOSPHATE SERPL-MCNC: 3.4 MG/DL — SIGNIFICANT CHANGE UP (ref 2.5–4.5)
PLATELET # BLD AUTO: 242 K/UL — SIGNIFICANT CHANGE UP (ref 150–400)
POTASSIUM SERPL-MCNC: 3.8 MMOL/L — SIGNIFICANT CHANGE UP (ref 3.5–5.3)
POTASSIUM SERPL-SCNC: 3.8 MMOL/L — SIGNIFICANT CHANGE UP (ref 3.5–5.3)
PROT SERPL-MCNC: 6.1 G/DL — SIGNIFICANT CHANGE UP (ref 6–8.3)
PROTHROM AB SERPL-ACNC: 13 SEC — SIGNIFICANT CHANGE UP (ref 10–13.1)
RBC # BLD: 3.83 M/UL — SIGNIFICANT CHANGE UP (ref 3.8–5.2)
RBC # FLD: 14.3 % — SIGNIFICANT CHANGE UP (ref 10.3–14.5)
SODIUM SERPL-SCNC: 139 MMOL/L — SIGNIFICANT CHANGE UP (ref 135–145)
WBC # BLD: 11.45 K/UL — HIGH (ref 3.8–10.5)
WBC # FLD AUTO: 11.45 K/UL — HIGH (ref 3.8–10.5)

## 2017-02-09 RX ORDER — OXYCODONE HYDROCHLORIDE 5 MG/1
5 TABLET ORAL EVERY 4 HOURS
Qty: 0 | Refills: 0 | Status: DISCONTINUED | OUTPATIENT
Start: 2017-02-09 | End: 2017-02-10

## 2017-02-09 RX ORDER — IBUPROFEN 200 MG
400 TABLET ORAL EVERY 6 HOURS
Qty: 0 | Refills: 0 | Status: DISCONTINUED | OUTPATIENT
Start: 2017-02-09 | End: 2017-02-10

## 2017-02-09 RX ORDER — ACETAMINOPHEN 500 MG
650 TABLET ORAL ONCE
Qty: 0 | Refills: 0 | Status: COMPLETED | OUTPATIENT
Start: 2017-02-09 | End: 2017-02-09

## 2017-02-09 RX ORDER — OXYCODONE HYDROCHLORIDE 5 MG/1
10 TABLET ORAL EVERY 4 HOURS
Qty: 0 | Refills: 0 | Status: DISCONTINUED | OUTPATIENT
Start: 2017-02-09 | End: 2017-02-10

## 2017-02-09 RX ADMIN — Medication 650 MILLIGRAM(S): at 19:31

## 2017-02-09 RX ADMIN — OXYCODONE HYDROCHLORIDE 10 MILLIGRAM(S): 5 TABLET ORAL at 14:39

## 2017-02-09 RX ADMIN — Medication 255 MILLIMOLE(S): at 02:14

## 2017-02-09 RX ADMIN — OXYCODONE HYDROCHLORIDE 10 MILLIGRAM(S): 5 TABLET ORAL at 19:29

## 2017-02-09 RX ADMIN — OXYCODONE HYDROCHLORIDE 10 MILLIGRAM(S): 5 TABLET ORAL at 09:40

## 2017-02-09 RX ADMIN — OXYCODONE HYDROCHLORIDE 10 MILLIGRAM(S): 5 TABLET ORAL at 19:59

## 2017-02-09 RX ADMIN — OXYCODONE HYDROCHLORIDE 10 MILLIGRAM(S): 5 TABLET ORAL at 10:10

## 2017-02-09 RX ADMIN — Medication 650 MILLIGRAM(S): at 20:01

## 2017-02-09 RX ADMIN — ENOXAPARIN SODIUM 40 MILLIGRAM(S): 100 INJECTION SUBCUTANEOUS at 17:01

## 2017-02-09 RX ADMIN — OXYCODONE HYDROCHLORIDE 10 MILLIGRAM(S): 5 TABLET ORAL at 14:09

## 2017-02-09 NOTE — PHYSICAL THERAPY INITIAL EVALUATION ADULT - PERTINENT HX OF CURRENT PROBLEM, REHAB EVAL
60 y/o female with PMH:  HTN/ borderline Hypercholesterolemia/ s/p ('2009): Clipping of Cerebral Aneurysm with V-P Shunt Placement. No Neuro deficits. Now Dx: + Gallstones. Scheduled: Laproscopic Cholycystectomy.

## 2017-02-10 ENCOUNTER — TRANSCRIPTION ENCOUNTER (OUTPATIENT)
Age: 62
End: 2017-02-10

## 2017-02-10 VITALS
SYSTOLIC BLOOD PRESSURE: 119 MMHG | RESPIRATION RATE: 18 BRPM | DIASTOLIC BLOOD PRESSURE: 80 MMHG | TEMPERATURE: 99 F | HEART RATE: 84 BPM | OXYGEN SATURATION: 95 %

## 2017-02-10 PROCEDURE — 85730 THROMBOPLASTIN TIME PARTIAL: CPT

## 2017-02-10 PROCEDURE — 83735 ASSAY OF MAGNESIUM: CPT

## 2017-02-10 PROCEDURE — 88307 TISSUE EXAM BY PATHOLOGIST: CPT

## 2017-02-10 PROCEDURE — 82565 ASSAY OF CREATININE: CPT

## 2017-02-10 PROCEDURE — 84100 ASSAY OF PHOSPHORUS: CPT

## 2017-02-10 PROCEDURE — 80053 COMPREHEN METABOLIC PANEL: CPT

## 2017-02-10 PROCEDURE — 80076 HEPATIC FUNCTION PANEL: CPT

## 2017-02-10 PROCEDURE — 86900 BLOOD TYPING SEROLOGIC ABO: CPT

## 2017-02-10 PROCEDURE — 80048 BASIC METABOLIC PNL TOTAL CA: CPT

## 2017-02-10 PROCEDURE — 82248 BILIRUBIN DIRECT: CPT

## 2017-02-10 PROCEDURE — 86923 COMPATIBILITY TEST ELECTRIC: CPT

## 2017-02-10 PROCEDURE — 97162 PT EVAL MOD COMPLEX 30 MIN: CPT

## 2017-02-10 PROCEDURE — 84132 ASSAY OF SERUM POTASSIUM: CPT

## 2017-02-10 PROCEDURE — 82330 ASSAY OF CALCIUM: CPT

## 2017-02-10 PROCEDURE — 85014 HEMATOCRIT: CPT

## 2017-02-10 PROCEDURE — P9040: CPT

## 2017-02-10 PROCEDURE — 82947 ASSAY GLUCOSE BLOOD QUANT: CPT

## 2017-02-10 PROCEDURE — 71045 X-RAY EXAM CHEST 1 VIEW: CPT

## 2017-02-10 PROCEDURE — 88313 SPECIAL STAINS GROUP 2: CPT

## 2017-02-10 PROCEDURE — 84295 ASSAY OF SERUM SODIUM: CPT

## 2017-02-10 PROCEDURE — 86901 BLOOD TYPING SEROLOGIC RH(D): CPT

## 2017-02-10 PROCEDURE — 83605 ASSAY OF LACTIC ACID: CPT

## 2017-02-10 PROCEDURE — C1889: CPT

## 2017-02-10 PROCEDURE — 85027 COMPLETE CBC AUTOMATED: CPT

## 2017-02-10 PROCEDURE — 82435 ASSAY OF BLOOD CHLORIDE: CPT

## 2017-02-10 PROCEDURE — 85610 PROTHROMBIN TIME: CPT

## 2017-02-10 PROCEDURE — P9041: CPT

## 2017-02-10 PROCEDURE — 82803 BLOOD GASES ANY COMBINATION: CPT

## 2017-02-10 PROCEDURE — 88309 TISSUE EXAM BY PATHOLOGIST: CPT

## 2017-02-10 RX ORDER — OXYCODONE HYDROCHLORIDE 5 MG/1
1 TABLET ORAL
Qty: 30 | Refills: 0
Start: 2017-02-10

## 2017-02-10 RX ORDER — IBUPROFEN 200 MG
1 TABLET ORAL
Qty: 0 | Refills: 0 | DISCHARGE
Start: 2017-02-10

## 2017-02-10 RX ADMIN — Medication 400 MILLIGRAM(S): at 16:51

## 2017-02-10 RX ADMIN — Medication 400 MILLIGRAM(S): at 08:45

## 2017-02-10 RX ADMIN — OXYCODONE HYDROCHLORIDE 10 MILLIGRAM(S): 5 TABLET ORAL at 08:45

## 2017-02-10 RX ADMIN — Medication 400 MILLIGRAM(S): at 08:15

## 2017-02-10 RX ADMIN — OXYCODONE HYDROCHLORIDE 10 MILLIGRAM(S): 5 TABLET ORAL at 16:50

## 2017-02-10 RX ADMIN — OXYCODONE HYDROCHLORIDE 10 MILLIGRAM(S): 5 TABLET ORAL at 13:00

## 2017-02-10 RX ADMIN — OXYCODONE HYDROCHLORIDE 10 MILLIGRAM(S): 5 TABLET ORAL at 12:27

## 2017-02-10 RX ADMIN — Medication 400 MILLIGRAM(S): at 17:30

## 2017-02-10 RX ADMIN — OXYCODONE HYDROCHLORIDE 10 MILLIGRAM(S): 5 TABLET ORAL at 08:13

## 2017-02-10 RX ADMIN — OXYCODONE HYDROCHLORIDE 10 MILLIGRAM(S): 5 TABLET ORAL at 17:30

## 2017-02-10 NOTE — DISCHARGE NOTE ADULT - INSTRUCTIONS
You may eat a regular diet and shower, but please do not soak your wounds in a bath or pool. You may shower. Pat Dry abdomen.   Activity as tolerated with Home PT and rolling walker  NOTIFY YOUR SURGEON IF: You have any bleeding that does not stop, any pus draining from your wound, any fever (over 100.4 F) or chills, persistent nausea/vomiting, persistent diarrhea, or if your pain is not controlled on your discharge pain medications.

## 2017-02-10 NOTE — DISCHARGE NOTE ADULT - MEDICATION SUMMARY - MEDICATIONS TO TAKE
I will START or STAY ON the medications listed below when I get home from the hospital:    Vitamin  B Complex  -- 1 tab(s) by mouth once a day  -- Indication: For Nutrition    Vitamin D    3,000 units  -- 1 tab(s) by mouth once a day  -- Indication: For Nutrition    ibuprofen 400 mg oral tablet  -- 1 tab(s) by mouth every 6 hours, As needed, pain  -- Indication: For Pain    oxyCODONE 5 mg oral tablet  -- 1-2 tab(s) by mouth every 4 hours, As needed, Mild Pain (1 - 3) MDD:12 tabs  -- Indication: For Pain    losartan 100 mg oral tablet  -- 1 tab(s) by mouth once a day  -- Indication: For Hypertension    hydroCHLOROthiazide 25 mg oral tablet  -- 1 tab(s) by mouth once a day  -- Indication: For Hypertension    potassium chloride 20 mEq oral tablet, extended release  -- 1 tab(s) by mouth once a day  -- Indication: For Hypokalemia    Vitamins  --  by mouth   Vitamin D  B complex  -- Indication: For Nutrition

## 2017-02-10 NOTE — DISCHARGE NOTE ADULT - CARE PROVIDERS DIRECT ADDRESSES
[email or web address]

## 2017-02-10 NOTE — DISCHARGE NOTE ADULT - HOME CARE AGENCY
[facility]    scheduled for  Start of care   2/11/17   SN eval,     PRETTY dave,    and  PT  colleen.   Agency will contact  you to set up of visit.  IF  you have any questions to same   you  may  contact  agency.

## 2017-02-10 NOTE — DISCHARGE NOTE ADULT - HOSPITAL COURSE
This is a 60 y/o female with PMH:  HTN/ borderline Hypercholesterolemia/ s/p ('2009): Clipping of Cerebral Aneurysm with V-P Shunt Placement. No Neuro deficits. Patient underwent an ex-lap, liver resection of segment 4 and 5, and portal lymphadenectomy for completion of radical cholecystectomy for GB cancer. The patient tolerated the procedure well. There were no complications. The patient was extubated in the OR.  The patient was transferred to the PACU in stable condition. After, patient was closely monitored in SICU.     Pain was controlled. Diet was advanced as tolerated. LFTs were down trending. H/H stable. PRETTY drain was serosanguineous and removed prior to DC home. Wound healing well. DVT ppx were provided. PT recommended home PT which was set up. Rolling walker also provided. VNS was instated for home. Patient treated for hypophosphatemia on this admission.     At the time of discharge, the patient was hemodynamically stable, was tolerating PO diet, was voiding urine and passing stool, was ambulating, and was comfortable with adequate pain control. The patient was instructed to follow up with Dr. Gregory within 1-2 weeks after discharge from the hospital. The patient felt comfortable with discharge. The patient was discharged to home. The patient had no other issues.

## 2017-02-10 NOTE — DISCHARGE NOTE ADULT - CARE PLAN
Principal Discharge DX:	Malignant neoplasm of gallbladder  Goal:	Completion of radical cholecystectomy with pain control and outpatient follow up. Principal Discharge DX:	Malignant neoplasm of gallbladder  Goal:	Completion of radical cholecystectomy with pain control and outpatient follow up.  Instructions for follow-up, activity and diet:	Please do not drive until your pain is well controlled, and you do not need to take pain medications. These medications may make you constipated. If so, please take over the counter stool softeners for symptoms.   Please follow up with Dr. Gregory in 1-2 weeks. Contact info below.

## 2017-02-10 NOTE — DISCHARGE NOTE ADULT - CARE PROVIDER_API CALL
Luis Gregory; PhD), Surgery  [address] [place] [address]  Phone: [phone]  Fax: [phone]

## 2017-02-10 NOTE — DISCHARGE NOTE ADULT - PLAN OF CARE
Completion of radical cholecystectomy with pain control and outpatient follow up. Please do not drive until your pain is well controlled, and you do not need to take pain medications. These medications may make you constipated. If so, please take over the counter stool softeners for symptoms.   Please follow up with Dr. Gregory in 1-2 weeks. Contact info below.

## 2017-02-10 NOTE — DISCHARGE NOTE ADULT - PATIENT PORTAL LINK FT
“You can access the FollowHealth Patient Portal, offered by [facility], by registering with the following website: [email or web address]”

## 2017-02-10 NOTE — DISCHARGE NOTE ADULT - ADDITIONAL INSTRUCTIONS
1. Dr. Gregory  2. Please call for a follow up appointment with your primary care medical doctor upon discharge regarding your recent surgery and hospitalization.

## 2017-02-13 LAB — SURGICAL PATHOLOGY STUDY: SIGNIFICANT CHANGE UP

## 2017-02-23 ENCOUNTER — APPOINTMENT (OUTPATIENT)
Dept: SURGERY | Facility: CLINIC | Age: 62
End: 2017-02-23

## 2017-02-23 VITALS
DIASTOLIC BLOOD PRESSURE: 81 MMHG | WEIGHT: 190 LBS | BODY MASS INDEX: 33.66 KG/M2 | HEIGHT: 63 IN | TEMPERATURE: 97.6 F | SYSTOLIC BLOOD PRESSURE: 126 MMHG | HEART RATE: 76 BPM

## 2018-06-24 NOTE — PHYSICAL THERAPY INITIAL EVALUATION ADULT - TRANSFER SKILLS, REHAB EVAL
Emergency Department Nursing Plan of Care       The Nursing Plan of Care is developed from the Nursing assessment and Emergency Department Attending provider initial evaluation. The plan of care may be reviewed in the ED Provider note. The Plan of Care was developed with the following considerations:   Patient / Family readiness to learn indicated by:verbalized understanding  Persons(s) to be included in education: patient  Barriers to Learning/Limitations:No    Signed     Alicia Raymundo    6/24/2018   11:26 AM    See nursing assessment    Patient is alert and oriented x 4 and in no acute distress at this time. Respirations are at a regular rate, depth, and pattern. Patient updated on plan of care and has no questions or concerns at this time.
Patient discharged by provider.
independent

## 2018-12-21 PROBLEM — C23 MALIGNANT NEOPLASM OF GALLBLADDER: Chronic | Status: ACTIVE | Noted: 2017-02-02

## 2019-01-10 ENCOUNTER — RESULT REVIEW (OUTPATIENT)
Age: 64
End: 2019-01-10

## 2019-01-15 ENCOUNTER — APPOINTMENT (OUTPATIENT)
Dept: GYNECOLOGIC ONCOLOGY | Facility: CLINIC | Age: 64
End: 2019-01-15

## 2019-03-19 ENCOUNTER — APPOINTMENT (OUTPATIENT)
Dept: GYNECOLOGIC ONCOLOGY | Facility: CLINIC | Age: 64
End: 2019-03-19
Payer: MEDICARE

## 2019-03-19 ENCOUNTER — RECORD ABSTRACTING (OUTPATIENT)
Age: 64
End: 2019-03-19

## 2019-03-19 VITALS
WEIGHT: 185 LBS | HEIGHT: 63 IN | BODY MASS INDEX: 32.78 KG/M2 | SYSTOLIC BLOOD PRESSURE: 120 MMHG | DIASTOLIC BLOOD PRESSURE: 80 MMHG

## 2019-03-19 DIAGNOSIS — Z87.891 PERSONAL HISTORY OF NICOTINE DEPENDENCE: ICD-10-CM

## 2019-03-19 DIAGNOSIS — Z86.39 PERSONAL HISTORY OF OTHER ENDOCRINE, NUTRITIONAL AND METABOLIC DISEASE: ICD-10-CM

## 2019-03-19 DIAGNOSIS — R10.9 UNSPECIFIED ABDOMINAL PAIN: ICD-10-CM

## 2019-03-19 DIAGNOSIS — C80.1 SECONDARY MALIGNANT NEOPLASM OF RETROPERITONEUM AND PERITONEUM: ICD-10-CM

## 2019-03-19 DIAGNOSIS — C78.6 SECONDARY MALIGNANT NEOPLASM OF RETROPERITONEUM AND PERITONEUM: ICD-10-CM

## 2019-03-19 DIAGNOSIS — C23 MALIGNANT NEOPLASM OF GALLBLADDER: ICD-10-CM

## 2019-03-19 DIAGNOSIS — R39.198 OTHER DIFFICULTIES WITH MICTURITION: ICD-10-CM

## 2019-03-19 DIAGNOSIS — R19.00 INTRA-ABDOMINAL AND PELVIC SWELLING, MASS AND LUMP, UNSPECIFIED SITE: ICD-10-CM

## 2019-03-19 DIAGNOSIS — D25.9 LEIOMYOMA OF UTERUS, UNSPECIFIED: ICD-10-CM

## 2019-03-19 PROCEDURE — 99205 OFFICE O/P NEW HI 60 MIN: CPT

## 2019-03-19 RX ORDER — OXYCODONE AND ACETAMINOPHEN 5; 325 MG/1; MG/1
5-325 TABLET ORAL
Qty: 60 | Refills: 0 | Status: ACTIVE | COMMUNITY
Start: 2019-02-05

## 2019-06-13 ENCOUNTER — INPATIENT (INPATIENT)
Facility: HOSPITAL | Age: 64
LOS: 4 days | Discharge: HOME | DRG: 374 | End: 2019-06-18
Attending: SURGERY | Admitting: SURGERY
Payer: MEDICARE

## 2019-06-13 VITALS
RESPIRATION RATE: 20 BRPM | HEIGHT: 63 IN | OXYGEN SATURATION: 97 % | TEMPERATURE: 98 F | WEIGHT: 184.97 LBS | HEART RATE: 87 BPM | DIASTOLIC BLOOD PRESSURE: 101 MMHG | SYSTOLIC BLOOD PRESSURE: 171 MMHG

## 2019-06-13 DIAGNOSIS — I26.99 OTHER PULMONARY EMBOLISM WITHOUT ACUTE COR PULMONALE: ICD-10-CM

## 2019-06-13 DIAGNOSIS — Z98.890 OTHER SPECIFIED POSTPROCEDURAL STATES: Chronic | ICD-10-CM

## 2019-06-13 DIAGNOSIS — Z98.2 PRESENCE OF CEREBROSPINAL FLUID DRAINAGE DEVICE: Chronic | ICD-10-CM

## 2019-06-13 DIAGNOSIS — Z90.49 ACQUIRED ABSENCE OF OTHER SPECIFIED PARTS OF DIGESTIVE TRACT: Chronic | ICD-10-CM

## 2019-06-13 LAB
ALBUMIN SERPL ELPH-MCNC: 4 G/DL — SIGNIFICANT CHANGE UP (ref 3.3–5)
ALP SERPL-CCNC: 80 U/L — SIGNIFICANT CHANGE UP (ref 40–120)
ALT FLD-CCNC: 5 U/L — LOW (ref 10–45)
ANION GAP SERPL CALC-SCNC: 13 MMOL/L — SIGNIFICANT CHANGE UP (ref 5–17)
APPEARANCE UR: CLEAR — SIGNIFICANT CHANGE UP
APTT BLD: 21.2 SEC — LOW (ref 27.5–36.3)
AST SERPL-CCNC: 12 U/L — SIGNIFICANT CHANGE UP (ref 10–40)
BACTERIA # UR AUTO: NEGATIVE — SIGNIFICANT CHANGE UP
BASOPHILS # BLD AUTO: 0 K/UL — SIGNIFICANT CHANGE UP (ref 0–0.2)
BASOPHILS NFR BLD AUTO: 0.4 % — SIGNIFICANT CHANGE UP (ref 0–2)
BILIRUB SERPL-MCNC: 0.4 MG/DL — SIGNIFICANT CHANGE UP (ref 0.2–1.2)
BILIRUB UR-MCNC: NEGATIVE — SIGNIFICANT CHANGE UP
BLD GP AB SCN SERPL QL: NEGATIVE — SIGNIFICANT CHANGE UP
BUN SERPL-MCNC: 6 MG/DL — LOW (ref 7–23)
CALCIUM SERPL-MCNC: 10 MG/DL — SIGNIFICANT CHANGE UP (ref 8.4–10.5)
CHLORIDE SERPL-SCNC: 96 MMOL/L — SIGNIFICANT CHANGE UP (ref 96–108)
CO2 SERPL-SCNC: 27 MMOL/L — SIGNIFICANT CHANGE UP (ref 22–31)
COLOR SPEC: SIGNIFICANT CHANGE UP
CREAT SERPL-MCNC: 0.5 MG/DL — SIGNIFICANT CHANGE UP (ref 0.5–1.3)
DIFF PNL FLD: NEGATIVE — SIGNIFICANT CHANGE UP
EOSINOPHIL # BLD AUTO: 0.1 K/UL — SIGNIFICANT CHANGE UP (ref 0–0.5)
EOSINOPHIL NFR BLD AUTO: 1 % — SIGNIFICANT CHANGE UP (ref 0–6)
EPI CELLS # UR: 0 /HPF — SIGNIFICANT CHANGE UP
GAS PNL BLDV: SIGNIFICANT CHANGE UP
GLUCOSE SERPL-MCNC: 128 MG/DL — HIGH (ref 70–99)
GLUCOSE UR QL: NEGATIVE — SIGNIFICANT CHANGE UP
HCT VFR BLD CALC: 38.4 % — SIGNIFICANT CHANGE UP (ref 34.5–45)
HGB BLD-MCNC: 13.5 G/DL — SIGNIFICANT CHANGE UP (ref 11.5–15.5)
HYALINE CASTS # UR AUTO: 0 /LPF — SIGNIFICANT CHANGE UP (ref 0–2)
INR BLD: 1.14 RATIO — SIGNIFICANT CHANGE UP (ref 0.88–1.16)
KETONES UR-MCNC: SIGNIFICANT CHANGE UP
LEUKOCYTE ESTERASE UR-ACNC: NEGATIVE — SIGNIFICANT CHANGE UP
LIDOCAIN IGE QN: 9 U/L — SIGNIFICANT CHANGE UP (ref 7–60)
LYMPHOCYTES # BLD AUTO: 1.1 K/UL — SIGNIFICANT CHANGE UP (ref 1–3.3)
LYMPHOCYTES # BLD AUTO: 20.6 % — SIGNIFICANT CHANGE UP (ref 13–44)
MCHC RBC-ENTMCNC: 33.6 PG — SIGNIFICANT CHANGE UP (ref 27–34)
MCHC RBC-ENTMCNC: 35 GM/DL — SIGNIFICANT CHANGE UP (ref 32–36)
MCV RBC AUTO: 95.8 FL — SIGNIFICANT CHANGE UP (ref 80–100)
MONOCYTES # BLD AUTO: 0.4 K/UL — SIGNIFICANT CHANGE UP (ref 0–0.9)
MONOCYTES NFR BLD AUTO: 8.2 % — SIGNIFICANT CHANGE UP (ref 2–14)
NEUTROPHILS # BLD AUTO: 3.8 K/UL — SIGNIFICANT CHANGE UP (ref 1.8–7.4)
NEUTROPHILS NFR BLD AUTO: 69.8 % — SIGNIFICANT CHANGE UP (ref 43–77)
NITRITE UR-MCNC: NEGATIVE — SIGNIFICANT CHANGE UP
PH UR: 6.5 — SIGNIFICANT CHANGE UP (ref 5–8)
PLATELET # BLD AUTO: 363 K/UL — SIGNIFICANT CHANGE UP (ref 150–400)
POTASSIUM SERPL-MCNC: 3.4 MMOL/L — LOW (ref 3.5–5.3)
POTASSIUM SERPL-SCNC: 3.4 MMOL/L — LOW (ref 3.5–5.3)
PROT SERPL-MCNC: 7.1 G/DL — SIGNIFICANT CHANGE UP (ref 6–8.3)
PROT UR-MCNC: SIGNIFICANT CHANGE UP
PROTHROM AB SERPL-ACNC: 13 SEC — HIGH (ref 10–12.9)
RBC # BLD: 4.01 M/UL — SIGNIFICANT CHANGE UP (ref 3.8–5.2)
RBC # FLD: 14.4 % — SIGNIFICANT CHANGE UP (ref 10.3–14.5)
RBC CASTS # UR COMP ASSIST: 4 /HPF — SIGNIFICANT CHANGE UP (ref 0–4)
RH IG SCN BLD-IMP: POSITIVE — SIGNIFICANT CHANGE UP
SODIUM SERPL-SCNC: 136 MMOL/L — SIGNIFICANT CHANGE UP (ref 135–145)
SP GR SPEC: >1.05 (ref 1.01–1.02)
UROBILINOGEN FLD QL: NEGATIVE — SIGNIFICANT CHANGE UP
WBC # BLD: 5.5 K/UL — SIGNIFICANT CHANGE UP (ref 3.8–10.5)
WBC # FLD AUTO: 5.5 K/UL — SIGNIFICANT CHANGE UP (ref 3.8–10.5)
WBC UR QL: 1 /HPF — SIGNIFICANT CHANGE UP (ref 0–5)

## 2019-06-13 PROCEDURE — 71275 CT ANGIOGRAPHY CHEST: CPT | Mod: 26

## 2019-06-13 PROCEDURE — 99285 EMERGENCY DEPT VISIT HI MDM: CPT | Mod: GC

## 2019-06-13 PROCEDURE — 74177 CT ABD & PELVIS W/CONTRAST: CPT | Mod: 26

## 2019-06-13 RX ORDER — SODIUM CHLORIDE 9 MG/ML
1000 INJECTION, SOLUTION INTRAVENOUS
Refills: 0 | Status: DISCONTINUED | OUTPATIENT
Start: 2019-06-13 | End: 2019-06-14

## 2019-06-13 RX ORDER — MORPHINE SULFATE 50 MG/1
4 CAPSULE, EXTENDED RELEASE ORAL ONCE
Refills: 0 | Status: DISCONTINUED | OUTPATIENT
Start: 2019-06-13 | End: 2019-06-13

## 2019-06-13 RX ORDER — SODIUM CHLORIDE 9 MG/ML
1000 INJECTION INTRAMUSCULAR; INTRAVENOUS; SUBCUTANEOUS ONCE
Refills: 0 | Status: COMPLETED | OUTPATIENT
Start: 2019-06-13 | End: 2019-06-13

## 2019-06-13 RX ORDER — ENOXAPARIN SODIUM 100 MG/ML
40 INJECTION SUBCUTANEOUS DAILY
Refills: 0 | Status: DISCONTINUED | OUTPATIENT
Start: 2019-06-13 | End: 2019-06-13

## 2019-06-13 RX ORDER — ACETAMINOPHEN 500 MG
1000 TABLET ORAL ONCE
Refills: 0 | Status: COMPLETED | OUTPATIENT
Start: 2019-06-13 | End: 2019-06-13

## 2019-06-13 RX ORDER — ONDANSETRON 8 MG/1
4 TABLET, FILM COATED ORAL ONCE
Refills: 0 | Status: COMPLETED | OUTPATIENT
Start: 2019-06-13 | End: 2019-06-13

## 2019-06-13 RX ORDER — HEPARIN SODIUM 5000 [USP'U]/ML
1400 INJECTION INTRAVENOUS; SUBCUTANEOUS
Qty: 25000 | Refills: 0 | Status: DISCONTINUED | OUTPATIENT
Start: 2019-06-13 | End: 2019-06-14

## 2019-06-13 RX ADMIN — SODIUM CHLORIDE 125 MILLILITER(S): 9 INJECTION, SOLUTION INTRAVENOUS at 23:16

## 2019-06-13 RX ADMIN — SODIUM CHLORIDE 1000 MILLILITER(S): 9 INJECTION INTRAMUSCULAR; INTRAVENOUS; SUBCUTANEOUS at 15:03

## 2019-06-13 RX ADMIN — Medication 400 MILLIGRAM(S): at 15:04

## 2019-06-13 RX ADMIN — ONDANSETRON 4 MILLIGRAM(S): 8 TABLET, FILM COATED ORAL at 15:03

## 2019-06-13 RX ADMIN — MORPHINE SULFATE 4 MILLIGRAM(S): 50 CAPSULE, EXTENDED RELEASE ORAL at 15:05

## 2019-06-13 RX ADMIN — HEPARIN SODIUM 14 UNIT(S)/HR: 5000 INJECTION INTRAVENOUS; SUBCUTANEOUS at 23:17

## 2019-06-13 NOTE — ED ADULT NURSE REASSESSMENT NOTE - NS ED NURSE REASSESS COMMENT FT1
RICCI Avila on 2 [facility] made aware that patient was hesitant about receiving IV heparin as she has been to told to not take any anticoagulants due to cerebral aneurism clip.

## 2019-06-13 NOTE — ED PROVIDER NOTE - OBJECTIVE STATEMENT
64F with pmh cerebral aneurysm with  shunt, HTN, HLD, gallbladder CA s/p resection with mets to abd on chemo presenting with abd pain, nausea, vomting, constipation x 3 days. Not passing flatus. No fever, chills, cp, sob, dizziness

## 2019-06-13 NOTE — H&P ADULT - NSICDXPASTMEDICALHX_GEN_ALL_CORE_FT
PAST MEDICAL HISTORY:  Cerebral aneurysm ' 2009:  surgically treated    Gallstones dx: ' 2015    History of osteoarthritis     Hypercholesterolemia Borderline. Diet-managed    Hypertension     Malignant neoplasm of gallbladder     Multiparity     Vitamin D deficiency

## 2019-06-13 NOTE — ED ADULT NURSE NOTE - NSIMPLEMENTINTERV_GEN_ALL_ED
Implemented All Universal Safety Interventions:  [place] to call system. Call bell, personal items and telephone within reach. Instruct patient to call for assistance. Room bathroom lighting operational. Non-slip footwear when patient is off stretcher. Physically safe environment: no spills, clutter or unnecessary equipment. Stretcher in lowest position, wheels locked, appropriate side rails in place.

## 2019-06-13 NOTE — ED ADULT NURSE NOTE - OBJECTIVE STATEMENT
64F with pmh cerebral aneurysm with  shunt, HTN, HLD, gallbladder CA s/p resection with mets to abd on chemo presenting with abd pain, nausea, vomiting, constipation x 3 days. Not passing flatus. No fever, chills, cp, sob, dizziness

## 2019-06-13 NOTE — ED CLERICAL - NS ED CLERK NOTE PRE-ARRIVAL INFORMATION; ADDITIONAL PRE-ARRIVAL INFORMATION
CC/Reason For referral: history of gallbladder cancer, patient taking Xleoda, constipation, pain, bleeding  Preferred Consultant(if applicable):  Who admits for you (if needed): Hospitalist  Do you have documents you would like to fax over?  Would you still like to speak to an ED attending?

## 2019-06-13 NOTE — ED PROVIDER NOTE - CLINICAL SUMMARY MEDICAL DECISION MAKING FREE TEXT BOX
Patient presenting abd pain, n/v/,constipation. Concern for obstructive bowel. Will obtain CT. Patient presenting abd pain, n/v/,constipation. Concern for obstructive bowel. Will obtain CT.    CLARKE Aquino MD: Pt is a 65 y/o female with pmh cerebral aneurysm with  shunt, HTN, HLD, gallbladder CA s/p resection with mets to abd, on chemo, who p/w c/o abd pain, nausea, vomting, constipation x 3 days. Not passing flatus. No fever, chills, cp, sob, dizziness. DDx: bowel obstruction, intraabdominal infection, constipation, ileus. Plan: basic labs, CTAP, pain control, IVF

## 2019-06-13 NOTE — ED PROVIDER NOTE - PROGRESS NOTE DETAILS
Per surgery admit to their service. Discussed b/l PE. States will discuss with their attending before initiating AC. Attending MD Hernandez: Spoke with Surgery resident, aware of PE, no AC at this time, recent aneurysm clipping, patient refusing NGT. Will admit.

## 2019-06-13 NOTE — H&P ADULT - ASSESSMENT
64F w/ PMH of GB adenocarcinoma s/p rxn, now w/ mets to the abd, now p/w malignant SBO      - Admit to Dr. Aguirre [facility]  - NPO/IVF  - hep gtt for b/l PE, no contraindication to AC  - pt refusing NGT at this time  - no standing pain medication  - Discussed w/ Dr. Aguirre, pending discussion tmr AM w/ patient and family regarding plan of care      Nasir Peña PGY-1  [facility]  [id number] 64F w/ PMH of GB adenocarcinoma s/p rxn, now w/ mets to the abd, now p/w malignant SBO      - Admit to Dr. Aguirre [facility]  - NPO/IVF  - hep gtt for b/l PE, no contraindication to AC  - pt refusing NGT at this time  - no standing pain medication  - Discussed w/ Dr. Aguirre, pending discussion tomorrow AM w/ patient and family regarding plan of care surgical vs non surgical      Nasir Peña PGY-1  [facility]  [id number]

## 2019-06-13 NOTE — ED ADULT NURSE REASSESSMENT NOTE - NS ED NURSE REASSESS COMMENT FT1
taken to bathroom via wheelchair, +voided large amount of clear yellow urine, angela well, vitals stable, CT abd results pending, dtr present, NS infusing

## 2019-06-13 NOTE — H&P ADULT - NSHPPHYSICALEXAM_GEN_ALL_CORE
General: No acute distress  Respiratory: Nonlabored  Cardiovascular: RRR  Abdominal: Soft, distended, nontender, RUQ incision scar noted well healed, No rebound or guarding. No organomegaly, no palpable mass.  Extremities: Warm

## 2019-06-13 NOTE — H&P ADULT - NSHPLABSRESULTS_GEN_ALL_CORE
13.5   5.5   )-----------( 363      ( 13 Jun 2019 13:46 )             38.4     06-13    136  |  96  |  6<L>  ----------------------------<  128<H>  3.4<L>   |  27  |  0.50    Ca    10.0      13 Jun 2019 13:46    TPro  7.1  /  Alb  4.0  /  TBili  0.4  /  DBili  x   /  AST  12  /  ALT  5<L>  /  AlkPhos  80  06-13    Lactate:  06-13 @ 13:46  1.1              Urinalysis Basic - ( 13 Jun 2019 16:50 )    Color: Light Yellow / Appearance: Clear / SG: >1.050 / pH: x  Gluc: x / Ketone: Trace  / Bili: Negative / Urobili: Negative   Blood: x / Protein: Trace / Nitrite: Negative   Leuk Esterase: Negative / RBC: 4 /hpf / WBC 1 /HPF   Sq Epi: x / Non Sq Epi: 0 /hpf / Bacteria: Negative        IMAGING:    < from: CT Abdomen and Pelvis w/ IV Cont (06.13.19 @ 16:12) >  EXAM:  CT ABDOMEN AND PELVIS IC                            PROCEDURE DATE:  06/13/2019  IMPRESSION:     Mildly dilated and fecalized loops of small bowel with a transition point   at the distal ileum consistent with a small bowel obstruction.    Large pelvic mass with solid and multicystic components causing mild   right hydroureteronephrosis.    Omental caking consistent with metastatic disease.    Moderate ascites.    Filling defects suggested within the right lower lobe segmental pulmonary   arteries. CT angiogram of the chest is recommended to further evaluate   for pulmonary emboli.    < end of copied text >      < from: CT Angio Chest w/ IV Cont (06.13.19 @ 18:45) >  EXAM:  CT ANGIO CHEST (W)AW IC                            PROCEDURE DATE:  06/13/2019    IMPRESSION:    Acute bilateral pulmonary emboli as above. No evidence of right heart   strain.    These findings were discussed with Dr. DAVILA at 6/13/2019 6:52 PM by Dr. Mcdaniel with read back confirmation.    < end of copied text >

## 2019-06-13 NOTE — H&P ADULT - NSICDXPASTSURGICALHX_GEN_ALL_CORE_FT
PAST SURGICAL HISTORY:  History of cholecystectomy 12/16    S/P cerebral aneurysm operation ' 2009:  Clipping of Cerebral Aneurysm    S/P ventricular shunt placement ' 2009

## 2019-06-13 NOTE — ED ADULT NURSE NOTE - NS ED NURSE REPORT GIVEN TO FT
Pt received bed assignment. Pt aware. Report given to RNMargarita. VSS. Pt stable for transport. Chart given to charge desk. Will cont to monitor.

## 2019-06-14 DIAGNOSIS — K56.609 UNSPECIFIED INTESTINAL OBSTRUCTION, UNSPECIFIED AS TO PARTIAL VERSUS COMPLETE OBSTRUCTION: ICD-10-CM

## 2019-06-14 DIAGNOSIS — C23 MALIGNANT NEOPLASM OF GALLBLADDER: ICD-10-CM

## 2019-06-14 DIAGNOSIS — I26.99 OTHER PULMONARY EMBOLISM WITHOUT ACUTE COR PULMONALE: ICD-10-CM

## 2019-06-14 LAB
ANION GAP SERPL CALC-SCNC: 11 MMOL/L — SIGNIFICANT CHANGE UP (ref 5–17)
APTT BLD: 104 SEC — HIGH (ref 27.5–36.3)
APTT BLD: 105.5 SEC — HIGH (ref 27.5–36.3)
APTT BLD: 107.1 SEC — HIGH (ref 27.5–36.3)
APTT BLD: 79.6 SEC — HIGH (ref 27.5–36.3)
BASOPHILS # BLD AUTO: 0.04 K/UL — SIGNIFICANT CHANGE UP (ref 0–0.2)
BASOPHILS NFR BLD AUTO: 0.6 % — SIGNIFICANT CHANGE UP (ref 0–2)
BLD GP AB SCN SERPL QL: NEGATIVE — SIGNIFICANT CHANGE UP
BUN SERPL-MCNC: 5 MG/DL — LOW (ref 7–23)
CALCIUM SERPL-MCNC: 9.2 MG/DL — SIGNIFICANT CHANGE UP (ref 8.4–10.5)
CHLORIDE SERPL-SCNC: 102 MMOL/L — SIGNIFICANT CHANGE UP (ref 96–108)
CO2 SERPL-SCNC: 28 MMOL/L — SIGNIFICANT CHANGE UP (ref 22–31)
CREAT SERPL-MCNC: 0.56 MG/DL — SIGNIFICANT CHANGE UP (ref 0.5–1.3)
CULTURE RESULTS: SIGNIFICANT CHANGE UP
EOSINOPHIL # BLD AUTO: 0.13 K/UL — SIGNIFICANT CHANGE UP (ref 0–0.5)
EOSINOPHIL NFR BLD AUTO: 1.9 % — SIGNIFICANT CHANGE UP (ref 0–6)
GLUCOSE SERPL-MCNC: 128 MG/DL — HIGH (ref 70–99)
HCT VFR BLD CALC: 35.1 % — SIGNIFICANT CHANGE UP (ref 34.5–45)
HCT VFR BLD CALC: 35.5 % — SIGNIFICANT CHANGE UP (ref 34.5–45)
HCT VFR BLD CALC: 36.7 % — SIGNIFICANT CHANGE UP (ref 34.5–45)
HCT VFR BLD CALC: 39.1 % — SIGNIFICANT CHANGE UP (ref 34.5–45)
HCV AB S/CO SERPL IA: 0.09 S/CO — SIGNIFICANT CHANGE UP (ref 0–0.99)
HCV AB SERPL-IMP: SIGNIFICANT CHANGE UP
HGB BLD-MCNC: 11.5 G/DL — SIGNIFICANT CHANGE UP (ref 11.5–15.5)
HGB BLD-MCNC: 12.3 G/DL — SIGNIFICANT CHANGE UP (ref 11.5–15.5)
HGB BLD-MCNC: 12.7 G/DL — SIGNIFICANT CHANGE UP (ref 11.5–15.5)
HGB BLD-MCNC: 13.4 G/DL — SIGNIFICANT CHANGE UP (ref 11.5–15.5)
IMM GRANULOCYTES NFR BLD AUTO: 0.1 % — SIGNIFICANT CHANGE UP (ref 0–1.5)
LACTATE SERPL-SCNC: 0.9 MMOL/L — SIGNIFICANT CHANGE UP (ref 0.7–2)
LYMPHOCYTES # BLD AUTO: 1.58 K/UL — SIGNIFICANT CHANGE UP (ref 1–3.3)
LYMPHOCYTES # BLD AUTO: 23 % — SIGNIFICANT CHANGE UP (ref 13–44)
MCHC RBC-ENTMCNC: 31.3 PG — SIGNIFICANT CHANGE UP (ref 27–34)
MCHC RBC-ENTMCNC: 32.8 GM/DL — SIGNIFICANT CHANGE UP (ref 32–36)
MCHC RBC-ENTMCNC: 33.2 PG — SIGNIFICANT CHANGE UP (ref 27–34)
MCHC RBC-ENTMCNC: 33.4 PG — SIGNIFICANT CHANGE UP (ref 27–34)
MCHC RBC-ENTMCNC: 33.9 PG — SIGNIFICANT CHANGE UP (ref 27–34)
MCHC RBC-ENTMCNC: 34.2 GM/DL — SIGNIFICANT CHANGE UP (ref 32–36)
MCHC RBC-ENTMCNC: 34.5 GM/DL — SIGNIFICANT CHANGE UP (ref 32–36)
MCHC RBC-ENTMCNC: 34.6 GM/DL — SIGNIFICANT CHANGE UP (ref 32–36)
MCV RBC AUTO: 95.4 FL — SIGNIFICANT CHANGE UP (ref 80–100)
MCV RBC AUTO: 96.9 FL — SIGNIFICANT CHANGE UP (ref 80–100)
MCV RBC AUTO: 97.1 FL — SIGNIFICANT CHANGE UP (ref 80–100)
MCV RBC AUTO: 97.7 FL — SIGNIFICANT CHANGE UP (ref 80–100)
MONOCYTES # BLD AUTO: 0.68 K/UL — SIGNIFICANT CHANGE UP (ref 0–0.9)
MONOCYTES NFR BLD AUTO: 9.9 % — SIGNIFICANT CHANGE UP (ref 2–14)
NEUTROPHILS # BLD AUTO: 4.44 K/UL — SIGNIFICANT CHANGE UP (ref 1.8–7.4)
NEUTROPHILS NFR BLD AUTO: 64.5 % — SIGNIFICANT CHANGE UP (ref 43–77)
PLATELET # BLD AUTO: 321 K/UL — SIGNIFICANT CHANGE UP (ref 150–400)
PLATELET # BLD AUTO: 324 K/UL — SIGNIFICANT CHANGE UP (ref 150–400)
PLATELET # BLD AUTO: 326 K/UL — SIGNIFICANT CHANGE UP (ref 150–400)
PLATELET # BLD AUTO: 341 K/UL — SIGNIFICANT CHANGE UP (ref 150–400)
POTASSIUM SERPL-MCNC: 3.1 MMOL/L — LOW (ref 3.5–5.3)
POTASSIUM SERPL-SCNC: 3.1 MMOL/L — LOW (ref 3.5–5.3)
RBC # BLD: 3.67 M/UL — LOW (ref 3.8–5.2)
RBC # BLD: 3.68 M/UL — LOW (ref 3.8–5.2)
RBC # BLD: 3.76 M/UL — LOW (ref 3.8–5.2)
RBC # BLD: 4.03 M/UL — SIGNIFICANT CHANGE UP (ref 3.8–5.2)
RBC # FLD: 14.6 % — HIGH (ref 10.3–14.5)
RBC # FLD: 14.6 % — HIGH (ref 10.3–14.5)
RBC # FLD: 14.8 % — HIGH (ref 10.3–14.5)
RBC # FLD: 14.9 % — HIGH (ref 10.3–14.5)
RH IG SCN BLD-IMP: POSITIVE — SIGNIFICANT CHANGE UP
SODIUM SERPL-SCNC: 141 MMOL/L — SIGNIFICANT CHANGE UP (ref 135–145)
SPECIMEN SOURCE: SIGNIFICANT CHANGE UP
WBC # BLD: 5.8 K/UL — SIGNIFICANT CHANGE UP (ref 3.8–10.5)
WBC # BLD: 6.4 K/UL — SIGNIFICANT CHANGE UP (ref 3.8–10.5)
WBC # BLD: 6.6 K/UL — SIGNIFICANT CHANGE UP (ref 3.8–10.5)
WBC # BLD: 6.88 K/UL — SIGNIFICANT CHANGE UP (ref 3.8–10.5)
WBC # FLD AUTO: 5.8 K/UL — SIGNIFICANT CHANGE UP (ref 3.8–10.5)
WBC # FLD AUTO: 6.4 K/UL — SIGNIFICANT CHANGE UP (ref 3.8–10.5)
WBC # FLD AUTO: 6.6 K/UL — SIGNIFICANT CHANGE UP (ref 3.8–10.5)
WBC # FLD AUTO: 6.88 K/UL — SIGNIFICANT CHANGE UP (ref 3.8–10.5)

## 2019-06-14 RX ORDER — OCTREOTIDE ACETATE 200 UG/ML
25 INJECTION, SOLUTION INTRAVENOUS; SUBCUTANEOUS
Qty: 500 | Refills: 0 | Status: DISCONTINUED | OUTPATIENT
Start: 2019-06-14 | End: 2019-06-18

## 2019-06-14 RX ORDER — DEXTROSE MONOHYDRATE, SODIUM CHLORIDE, AND POTASSIUM CHLORIDE 50; .745; 4.5 G/1000ML; G/1000ML; G/1000ML
1000 INJECTION, SOLUTION INTRAVENOUS
Refills: 0 | Status: DISCONTINUED | OUTPATIENT
Start: 2019-06-14 | End: 2019-06-15

## 2019-06-14 RX ORDER — HEPARIN SODIUM 5000 [USP'U]/ML
3000 INJECTION INTRAVENOUS; SUBCUTANEOUS EVERY 6 HOURS
Refills: 0 | Status: DISCONTINUED | OUTPATIENT
Start: 2019-06-14 | End: 2019-06-14

## 2019-06-14 RX ORDER — HEPARIN SODIUM 5000 [USP'U]/ML
6500 INJECTION INTRAVENOUS; SUBCUTANEOUS EVERY 6 HOURS
Refills: 0 | Status: DISCONTINUED | OUTPATIENT
Start: 2019-06-14 | End: 2019-06-14

## 2019-06-14 RX ORDER — HEPARIN SODIUM 5000 [USP'U]/ML
1100 INJECTION INTRAVENOUS; SUBCUTANEOUS
Qty: 25000 | Refills: 0 | Status: DISCONTINUED | OUTPATIENT
Start: 2019-06-14 | End: 2019-06-14

## 2019-06-14 RX ORDER — ACETAMINOPHEN 500 MG
1000 TABLET ORAL ONCE
Refills: 0 | Status: COMPLETED | OUTPATIENT
Start: 2019-06-14 | End: 2019-06-14

## 2019-06-14 RX ORDER — HEPARIN SODIUM 5000 [USP'U]/ML
1100 INJECTION INTRAVENOUS; SUBCUTANEOUS
Qty: 25000 | Refills: 0 | Status: DISCONTINUED | OUTPATIENT
Start: 2019-06-14 | End: 2019-06-15

## 2019-06-14 RX ORDER — DEXAMETHASONE 0.5 MG/5ML
10 ELIXIR ORAL EVERY 8 HOURS
Refills: 0 | Status: DISCONTINUED | OUTPATIENT
Start: 2019-06-14 | End: 2019-06-14

## 2019-06-14 RX ORDER — METOCLOPRAMIDE HCL 10 MG
10 TABLET ORAL EVERY 8 HOURS
Refills: 0 | Status: DISCONTINUED | OUTPATIENT
Start: 2019-06-14 | End: 2019-06-18

## 2019-06-14 RX ORDER — DEXAMETHASONE 0.5 MG/5ML
10 ELIXIR ORAL EVERY 8 HOURS
Refills: 0 | Status: COMPLETED | OUTPATIENT
Start: 2019-06-14 | End: 2019-06-15

## 2019-06-14 RX ADMIN — Medication 102 MILLIGRAM(S): at 14:30

## 2019-06-14 RX ADMIN — Medication 102 MILLIGRAM(S): at 21:14

## 2019-06-14 RX ADMIN — SODIUM CHLORIDE 125 MILLILITER(S): 9 INJECTION, SOLUTION INTRAVENOUS at 08:11

## 2019-06-14 RX ADMIN — Medication 1000 MILLIGRAM(S): at 11:03

## 2019-06-14 RX ADMIN — Medication 400 MILLIGRAM(S): at 10:19

## 2019-06-14 RX ADMIN — Medication 10 MILLIGRAM(S): at 14:34

## 2019-06-14 RX ADMIN — DEXTROSE MONOHYDRATE, SODIUM CHLORIDE, AND POTASSIUM CHLORIDE 75 MILLILITER(S): 50; .745; 4.5 INJECTION, SOLUTION INTRAVENOUS at 16:40

## 2019-06-14 RX ADMIN — OCTREOTIDE ACETATE 5 MICROGRAM(S)/HR: 200 INJECTION, SOLUTION INTRAVENOUS; SUBCUTANEOUS at 09:01

## 2019-06-14 RX ADMIN — OCTREOTIDE ACETATE 5 MICROGRAM(S)/HR: 200 INJECTION, SOLUTION INTRAVENOUS; SUBCUTANEOUS at 21:03

## 2019-06-14 RX ADMIN — HEPARIN SODIUM 1100 UNIT(S)/HR: 5000 INJECTION INTRAVENOUS; SUBCUTANEOUS at 21:02

## 2019-06-14 RX ADMIN — Medication 10 MILLIGRAM(S): at 21:14

## 2019-06-14 RX ADMIN — HEPARIN SODIUM 12 UNIT(S)/HR: 5000 INJECTION INTRAVENOUS; SUBCUTANEOUS at 08:10

## 2019-06-14 RX ADMIN — DEXTROSE MONOHYDRATE, SODIUM CHLORIDE, AND POTASSIUM CHLORIDE 75 MILLILITER(S): 50; .745; 4.5 INJECTION, SOLUTION INTRAVENOUS at 21:03

## 2019-06-14 RX ADMIN — HEPARIN SODIUM 1100 UNIT(S)/HR: 5000 INJECTION INTRAVENOUS; SUBCUTANEOUS at 14:34

## 2019-06-14 NOTE — PROGRESS NOTE ADULT - ASSESSMENT
64F w/ PMH of GB adenocarcinoma s/p rxn, now w/ mets to the abd, now p/w malignant SBO      - pain control after abdominal exam  - NPO/IVF  - serial abdominal exams  - discuss NGT placement    [facility] surgery  [id number] 64F w/ PMH of GB adenocarcinoma s/p rxn, now w/ mets to the abd, now p/w malignant SBO      - pain control after abdominal exam  - advance diet to CLD  - serial abdominal exams  - malignant SBO protocol    [facility] surgery  [id number] 64F w/ PMH of T2Nx GB adenocarcinoma s/p rxn, now w/ mets to the abd, now p/w malignant SBO      - pain control after abdominal exam  - advance diet to CLD  - serial abdominal exams  - malignant SBO protocol    [facility] surgery  [id number] 64F w/ PMH of T2Nx GB adenocarcinoma s/p rxn, now w/ mets to the abd, now p/w malignant SBO      - advance diet to CLD  - serial abdominal exams  - malignant SBO protocol  - continue heparin gtt, will consider long term anticoagulation planning     [facility] surgery  [id number]

## 2019-06-14 NOTE — PROVIDER CONTACT NOTE (OTHER) - ASSESSMENT
pt resting comfortably in bed, no c/o pain. all VSS at this time. no s/s of distress. Heparin infusing at 14u/hr

## 2019-06-14 NOTE — CONSULT NOTE ADULT - PROBLEM SELECTOR RECOMMENDATION 2
agree with adequate admission with IV heparin,  Monitor PTT and adjust the dose to keep the PTT 2.5 times normal.

## 2019-06-14 NOTE — CONSULT NOTE ADULT - CONSULT REASON
Hx of Adenocarcinoma of the gallbladder and patient has been vomiting Hx of Adenocarcinoma of the gallbladder and patient has been vomiting, has SBO

## 2019-06-14 NOTE — CONSULT NOTE ADULT - PROBLEM SELECTOR RECOMMENDATION 9
patient has progressive disease, peritoneal carcinomatosis And has small bowel obstruction.  She is refusing NG tube at present.  Being monitored closely by surgery team  Prognosis poor

## 2019-06-14 NOTE — PROGRESS NOTE ADULT - SUBJECTIVE AND OBJECTIVE BOX
Surgery Progress Note      Subjective: Patient seen and examined. No acute events overnight.     T(C): 36.6 (06-14-19 @ 01:08), Max: 36.9 (06-13-19 @ 20:03)  HR: 65 (06-14-19 @ 01:08) (65 - 87)  BP: 144/78 (06-14-19 @ 01:08) (144/78 - 171/101)  RR: 18 (06-14-19 @ 01:08) (18 - 20)  SpO2: 95% (06-14-19 @ 01:08) (94% - 100%)      06-13-19 @ 07:01  -  06-14-19 @ 04:48  --------------------------------------------------------  IN: 0 mL / OUT: 300 mL / NET: -300 mL        Physical Exam:   General: NAD  Abdomen: soft, distended, not tender, RUQ well healed incision    Labs:      Medications:     heparin  Infusion 1400 Unit(s)/Hr IV Continuous <Continuous>  lactated ringers. 1000 milliLiter(s) IV Continuous <Continuous>      Radiographs: No new imaging Surgery Progress Note      Subjective: Patient seen and examined. No acute events overnight. denies nausea/vomiting  BM x2 yesterday    T(C): 36.6 (06-14-19 @ 01:08), Max: 36.9 (06-13-19 @ 20:03)  HR: 65 (06-14-19 @ 01:08) (65 - 87)  BP: 144/78 (06-14-19 @ 01:08) (144/78 - 171/101)  RR: 18 (06-14-19 @ 01:08) (18 - 20)  SpO2: 95% (06-14-19 @ 01:08) (94% - 100%)      06-13-19 @ 07:01  -  06-14-19 @ 04:48  --------------------------------------------------------  IN: 0 mL / OUT: 300 mL / NET: -300 mL        Physical Exam:   General: NAD  Abdomen: soft, distended, not tender, RUQ well healed incision    Labs:                          12.3   6.6   )-----------( 326      ( 14 Jun 2019 06:00 )             35.5     06-14    141  |  102  |  5<L>  ----------------------------<  128<H>  3.1<L>   |  28  |  0.56    Ca    9.2      14 Jun 2019 06:00    TPro  7.1  /  Alb  4.0  /  TBili  0.4  /  DBili  x   /  AST  12  /  ALT  5<L>  /  AlkPhos  80  06-13      Medications:     heparin  Infusion 1400 Unit(s)/Hr IV Continuous <Continuous>  lactated ringers. 1000 milliLiter(s) IV Continuous <Continuous>      Radiographs: No new imaging Surgery Progress Note      Subjective: Patient seen and examined. No acute events overnight. Denies nausea/vomiting. BM x2 yesterday. Patient reports  shunt.     T(C): 36.6 (06-14-19 @ 01:08), Max: 36.9 (06-13-19 @ 20:03)  HR: 65 (06-14-19 @ 01:08) (65 - 87)  BP: 144/78 (06-14-19 @ 01:08) (144/78 - 171/101)  RR: 18 (06-14-19 @ 01:08) (18 - 20)  SpO2: 95% (06-14-19 @ 01:08) (94% - 100%)      06-13-19 @ 07:01  -  06-14-19 @ 04:48  --------------------------------------------------------  IN: 0 mL / OUT: 300 mL / NET: -300 mL        Physical Exam:   General: NAD  Abdomen: soft, distended, not tender, RUQ well healed incision    Labs:                          12.3   6.6   )-----------( 326      ( 14 Jun 2019 06:00 )             35.5     06-14    141  |  102  |  5<L>  ----------------------------<  128<H>  3.1<L>   |  28  |  0.56    Ca    9.2      14 Jun 2019 06:00    TPro  7.1  /  Alb  4.0  /  TBili  0.4  /  DBili  x   /  AST  12  /  ALT  5<L>  /  AlkPhos  80  06-13      Medications:     heparin  Infusion 1400 Unit(s)/Hr IV Continuous <Continuous>  lactated ringers. 1000 milliLiter(s) IV Continuous <Continuous>      Radiographs:  CT Chest (6/13)   < from: CT Angio Chest w/ IV Cont (06.13.19 @ 18:45) >  CT Angiography of the Chest.  90 ml of Omnipaque 350 was injected intravenously. 10 ml were discarded.  Sagittal and coronal reformats were performed as well as 3D (MIP)   reconstructions.      FINDINGS:    LUNGS AND AIRWAYS: Patent central airways.  Mild compressive subsegmental   atelectasis in the left lower lobe.    PLEURA: Small left pleural effusion. No pneumothorax.    MEDIASTINUM AND COLTON: No lymphadenopathy.    VESSELS: Acute pulmonary emboli within the right lower lobar artery   extending into right lower lobe segmental pulmonary arteries. Additional   acute pulmonary embolus in a left upper lobe segmental artery.    HEART: Heart size is normal. No CT evidence of right heart strain. No   pericardial effusion.    CHEST WALL AND LOWER NECK: Left sided Mediport.    VISUALIZED UPPER ABDOMEN: Partially visualized ascites, peritoneal   carcinomatosis and right hydronephrosis.     BONES: Degenerative changes.    IMPRESSION:    Acute bilateral pulmonary emboli as above. No evidence of right heart   strain.

## 2019-06-14 NOTE — PROVIDER CONTACT NOTE (OTHER) - ACTION/TREATMENT ORDERED:
As per MD, pt is off home med d/t malignant SBO, continue to monitor BP throughout shift. No further action taken, will continue to monitor.

## 2019-06-14 NOTE — CONSULT NOTE ADULT - SUBJECTIVE AND OBJECTIVE BOX
formerly [facility] Hematology/Oncology - Ml Zhu / Joshua / Braulio - [phone]  Primary Outpatient Hematologist/Oncologist:     Chief Complaint:  Patient is a 64y old  Female who presents with a chief complaint of nausea vomiting obstipation (14 Jun 2019 04:48)      HPI:  64F w/ PMH of cerebral aneurysm, s/p  shunt, HTN, HLD, s/p lap cholecystectomy, path positive for GB adenocarcinoma s/p liver rxn w/ mets to the abd, currently on chemo, now p/w 3 days of n/v, inability to intake PO, no flatus or BM, and abd pain and distension. She vomited once in the AM in ED, otherwise  her nausea improved since admission to ED. Otherwise, Pt denies f/c, cp/SOB, dizziness.     CT abd/Pelvis obtained in ED showing mild SBO w/ TP in the terminal ileum. CT chest showing small b/l PE. Pt refused NGT insertion. Of note, pt's oncologist is Dr. 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   He was treated with adjuvant Ge from 04/17/2/07/18.  CT of the abdomen and pelvis  in 12/18 revealed 6.5 cm lesion in the lower pelvis and right hepatic lobe implant and a toenail nodularity and biopsy In 01/19was positive for metastatic diseaseSocial was started on Gemzar and oxaliplatin however,PET/CT scan on 04/02/19 revealed progressive disease and significant increase in the size of the cystic complement of the large pelvic mass noted on the previous scans also.  She was started on Xeloda on 05/8/19.  She was Scheduled for a follow-up PET/CT scan on 06/17/19.       ROS:  General:  (-)Pain, (-)Decrease appeite, (-)Fevers, (-)Chills, (-)Weight Loss  Eyes: (-)Blurry Vision, (-)Double Vision, (-)Vision Loss  ENT: (-)Sinus Congestion, (-)Decreased Hearing, (-)Nosebleeds, (-)Sore Throat  Cardiac: (-)Chest Pain, (-)Palpitations, (-)Shortness of breathe on exertion  Respiratory: (-)Cough, (-)hemoptysis, (-)Shortness of breathe  GI: (+)Nausea, (+)Vomiting, (-)Diarrhea, (-)Constipation, (-)Melena, (-)BRBPR  : (-)Hematuria, (-)Dysuria, (-)Polyuia  MSK: (-)Back pain, (-)Joint pain, (-)Stiffness    Neurology:  (-)Numbness, (-)Tingling, (-)Difficulty Walking, (-)Tremors, (-)Weakness  Psych: (-)Anxiety, (-)Depression, (-)Memory Loss  Hematology:  (-)Easy Bruising, (-)Easy Bleeding, (-)Night Sweats.     PAST MEDICAL & SURGICAL HISTORY:  Malignant neoplasm of gallbladder  History of osteoarthritis  Gallstones: dx: &#x27; 2015  Hypercholesterolemia: Borderline. Diet-managed  Multiparity  Hypertension  Vitamin D deficiency  Cerebral aneurysm: &#x27; 2009:  surgically treated  History of cholecystectomy: 12/16  S/P ventricular shunt placement: &#x27; 2009  S/P cerebral aneurysm operation: &#x27; 2009:  Clipping of Cerebral Aneurysm      Social History  Tobacco: Denies current use  Alcohol: Denies current use  Drugs:  Denies current use    FAMILY HISTORY:  CAD (coronary artery disease): Father      MEDICATIONS  (STANDING):  dexamethasone  IVPB 10 milliGRAM(s) IV Intermittent every 8 hours  heparin  Infusion. 1100 Unit(s)/Hr (11 mL/Hr) IV Continuous <Continuous>  lactated ringers. 1000 milliLiter(s) (125 mL/Hr) IV Continuous <Continuous>  metoclopramide Injectable 10 milliGRAM(s) IV Push every 8 hours  octreotide  Infusion 25 MICROgram(s)/Hr (5 mL/Hr) IV Continuous <Continuous>    MEDICATIONS  (PRN):      Allergies    No Known Allergies    Intolerances        Vital Signs Last 24 Hrs  T(C): 36.4 (14 Jun 2019 13:17), Max: 36.9 (13 Jun 2019 20:03)  T(F): 97.5 (14 Jun 2019 13:17), Max: 98.5 (14 Jun 2019 05:51)  HR: 73 (14 Jun 2019 13:17) (60 - 84)  BP: 157/90 (14 Jun 2019 13:17) (132/84 - 164/95)  BP(mean): --  RR: 18 (14 Jun 2019 13:17) (18 - 19)  SpO2: 96% (14 Jun 2019 13:17) (94% - 100%)    Physical Exam:  General: AAO x 3. NAD. Lying in bed comfortably.  HEENT: clear oropharynx, anicteric sclera, pink conjunctivae, EOMi. PERRLA  Cardiac: S1, S2 present. No audible murmurs. RRR  Lungs: CTA B/L.   Abdomen: Soft, Non-Tender, Non-Distended. No palpable hepatosplenomegaly  Extremities: 2+ pulses. No edema  Skin: No Rashes. No petechiae  Neuro: No focal motor or sensory deficits.     Labs:  CBC Full  -  ( 14 Jun 2019 13:22 )  WBC Count : 5.8 K/uL  RBC Count : 3.76 M/uL  Hemoglobin : 12.7 g/dL  Hematocrit : 36.7 %  Platelet Count - Automated : 324 K/uL  Mean Cell Volume : 97.7 fl  Mean Cell Hemoglobin : 33.9 pg  Mean Cell Hemoglobin Concentration : 34.6 gm/dL  Auto Neutrophil # : x  Auto Lymphocyte # : x  Auto Monocyte # : x  Auto Eosinophil # : x  Auto Basophil # : x  Auto Neutrophil % : x  Auto Lymphocyte % : x  Auto Monocyte % : x  Auto Eosinophil % : x  Auto Basophil % : x    06-14    141  |  102  |  5<L>  ----------------------------<  128<H>  3.1<L>   |  28  |  0.56    Ca    9.2      14 Jun 2019 06:00    TPro  7.1  /  Alb  4.0  /  TBili  0.4  /  DBili  x   /  AST  12  /  ALT  5<L>  /  AlkPhos  80  06-13    PT/INR - ( 13 Jun 2019 19:32 )   PT: 13.0 sec;   INR: 1.14 ratio         PTT - ( 14 Jun 2019 13:22 )  PTT:105.5 sec    < from: CT Angio Chest w/ IV Cont (06.13.19 @ 18:45) >  CT Angiography of the Chest.  90 ml of Omnipaque 350 was injected intravenously. 10 ml were discarded.  Sagittal and coronal reformats were performed as well as 3D (MIP)   reconstructions.      FINDINGS:    LUNGS AND AIRWAYS: Patent central airways.  Mild compressive subsegmental   atelectasis in the left lower lobe.    PLEURA: Small left pleural effusion. No pneumothorax.    MEDIASTINUM AND COLTON: No lymphadenopathy.    VESSELS: Acute pulmonary emboli within the right lower lobar artery   extending into right lower lobe segmental pulmonary arteries. Additional   acute pulmonary embolus in a left upper lobe segmental artery.    HEART: Heart size is normal. No CT evidence of right heart strain. No   pericardial effusion.    CHEST WALL AND LOWER NECK: Left sided Mediport.    VISUALIZED UPPER ABDOMEN: Partially visualized ascites, peritoneal   carcinomatosis and right hydronephrosis.     BONES: Degenerative changes.    IMPRESSION:    Acute bilateral pulmonary emboli as above. No evidence of right heart   strain.    < end of copied text >  CT Abdomen and Pelvis w/ IV Cont (06.13.19 @ 16:12) >  Mildly dilated and fecalized loops of small bowel with a transition point   at the distal ileum consistent with a small bowel obstruction.    Large pelvic mass with solid and multicystic components causing mild   right hydroureteronephrosis.    Omental caking consistent with metastatic disease.    Moderate ascites.    Filling defects suggested within the right lower lobe segmental pulmonary   arteries. CT angiogram of the chest is recommended to further evaluate   for pulmonary emboli.    < end of copied text > [facility] Hematology/Oncology - Ml Zhu / Joshua / Braulio - [phone]  Primary Outpatient Hematologist/Oncologist:     Chief Complaint:  Patient is a 64y old  Female who presents with a chief complaint of nausea vomiting obstipation (14 Jun 2019 04:48)      HPI:  64F w/ PMH of cerebral aneurysm, s/p  shunt, HTN, HLD, s/p lap cholecystectomy, path positive for GB adenocarcinoma s/p liver rxn w/ mets to the abd, currently on chemo, now p/w 3 days of n/v, inability to intake PO, no flatus or BM, and abd pain and distension. She vomited once in the AM in ED, otherwise  her nausea improved since admission to ED. Otherwise, Pt denies f/c, cp/SOB, dizziness.     CT abd/Pelvis obtained in ED showing mild SBO w/ TP in the terminal ileum. CT chest showing small b/l PE. Pt refused NGT insertion. Of note, pt's oncologist is Dr. 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   He was treated with adjuvant Ge from 04/17/2/07/18.  CT of the abdomen and pelvis  in 12/18 revealed 6.5 cm lesion in the lower pelvis and right hepatic lobe implant and a toenail nodularity and biopsy In 01/19was positive for metastatic diseaseSocial was started on Gemzar and oxaliplatin however,PET/CT scan on 04/02/19 revealed progressive disease and significant increase in the size of the cystic complement of the large pelvic mass noted on the previous scans also.  She was started on Xeloda on 05/8/19.  She was Scheduled for a follow-up PET/CT scan on 06/17/19.       ROS:  General:  (-)Pain, (-)Decrease appeite, (-)Fevers, (-)Chills, (-)Weight Loss  Eyes: (-)Blurry Vision, (-)Double Vision, (-)Vision Loss  ENT: (-)Sinus Congestion, (-)Decreased Hearing, (-)Nosebleeds, (-)Sore Throat  Cardiac: (-)Chest Pain, (-)Palpitations, (-)Shortness of breathe on exertion  Respiratory: (-)Cough, (-)hemoptysis, (-)Shortness of breathe  GI: (+)Nausea, (+)Vomiting, (-)Diarrhea, (-)Constipation, (-)Melena, (-)BRBPR  : (-)Hematuria, (-)Dysuria, (-)Polyuia  MSK: (-)Back pain, (-)Joint pain, (-)Stiffness    Neurology:  (-)Numbness, (-)Tingling, (-)Difficulty Walking, (-)Tremors, (-)Weakness  Psych: (-)Anxiety, (-)Depression, (-)Memory Loss  Hematology:  (-)Easy Bruising, (-)Easy Bleeding, (-)Night Sweats.     PAST MEDICAL & SURGICAL HISTORY:  Malignant neoplasm of gallbladder  History of osteoarthritis  Gallstones: dx: &#x27; 2015  Hypercholesterolemia: Borderline. Diet-managed  Multiparity  Hypertension  Vitamin D deficiency  Cerebral aneurysm: &#x27; 2009:  surgically treated  History of cholecystectomy: 12/16  S/P ventricular shunt placement: &#x27; 2009  S/P cerebral aneurysm operation: &#x27; 2009:  Clipping of Cerebral Aneurysm      Social History  Tobacco: Denies current use  Alcohol: Denies current use  Drugs:  Denies current use    FAMILY HISTORY:  CAD (coronary artery disease): Father      MEDICATIONS  (STANDING):  dexamethasone  IVPB 10 milliGRAM(s) IV Intermittent every 8 hours  heparin  Infusion. 1100 Unit(s)/Hr (11 mL/Hr) IV Continuous <Continuous>  lactated ringers. 1000 milliLiter(s) (125 mL/Hr) IV Continuous <Continuous>  metoclopramide Injectable 10 milliGRAM(s) IV Push every 8 hours  octreotide  Infusion 25 MICROgram(s)/Hr (5 mL/Hr) IV Continuous <Continuous>    MEDICATIONS  (PRN):      Allergies    No Known Allergies    Intolerances        Vital Signs Last 24 Hrs  T(C): 36.4 (14 Jun 2019 13:17), Max: 36.9 (13 Jun 2019 20:03)  T(F): 97.5 (14 Jun 2019 13:17), Max: 98.5 (14 Jun 2019 05:51)  HR: 73 (14 Jun 2019 13:17) (60 - 84)  BP: 157/90 (14 Jun 2019 13:17) (132/84 - 164/95)  BP(mean): --  RR: 18 (14 Jun 2019 13:17) (18 - 19)  SpO2: 96% (14 Jun 2019 13:17) (94% - 100%)    Physical Exam:  General: AAO x 3. NAD. Lying in bed comfortably.  HEENT: clear oropharynx, anicteric sclera, pink conjunctivae, EOMi. PERRLA  Cardiac: S1, S2 present. No audible murmurs. RRR  Lungs: CTA B/L.   Abdomen: Soft, mild tenderness, Distended. hyperactive BS  Extremities: 2+ pulses. No edema  Skin: No Rashes. No petechiae  Neuro: No focal motor or sensory deficits.     Labs:  CBC Full  -  ( 14 Jun 2019 13:22 )  WBC Count : 5.8 K/uL  RBC Count : 3.76 M/uL  Hemoglobin : 12.7 g/dL  Hematocrit : 36.7 %  Platelet Count - Automated : 324 K/uL  Mean Cell Volume : 97.7 fl  Mean Cell Hemoglobin : 33.9 pg  Mean Cell Hemoglobin Concentration : 34.6 gm/dL  Auto Neutrophil # : x  Auto Lymphocyte # : x  Auto Monocyte # : x  Auto Eosinophil # : x  Auto Basophil # : x  Auto Neutrophil % : x  Auto Lymphocyte % : x  Auto Monocyte % : x  Auto Eosinophil % : x  Auto Basophil % : x    06-14    141  |  102  |  5<L>  ----------------------------<  128<H>  3.1<L>   |  28  |  0.56    Ca    9.2      14 Jun 2019 06:00    TPro  7.1  /  Alb  4.0  /  TBili  0.4  /  DBili  x   /  AST  12  /  ALT  5<L>  /  AlkPhos  80  06-13    PT/INR - ( 13 Jun 2019 19:32 )   PT: 13.0 sec;   INR: 1.14 ratio         PTT - ( 14 Jun 2019 13:22 )  PTT:105.5 sec    < from: CT Angio Chest w/ IV Cont (06.13.19 @ 18:45) >  CT Angiography of the Chest.  90 ml of Omnipaque 350 was injected intravenously. 10 ml were discarded.  Sagittal and coronal reformats were performed as well as 3D (MIP)   reconstructions.      FINDINGS:    LUNGS AND AIRWAYS: Patent central airways.  Mild compressive subsegmental   atelectasis in the left lower lobe.    PLEURA: Small left pleural effusion. No pneumothorax.    MEDIASTINUM AND COLTON: No lymphadenopathy.    VESSELS: Acute pulmonary emboli within the right lower lobar artery   extending into right lower lobe segmental pulmonary arteries. Additional   acute pulmonary embolus in a left upper lobe segmental artery.    HEART: Heart size is normal. No CT evidence of right heart strain. No   pericardial effusion.    CHEST WALL AND LOWER NECK: Left sided Mediport.    VISUALIZED UPPER ABDOMEN: Partially visualized ascites, peritoneal   carcinomatosis and right hydronephrosis.     BONES: Degenerative changes.    IMPRESSION:    Acute bilateral pulmonary emboli as above. No evidence of right heart   strain.    < end of copied text >  CT Abdomen and Pelvis w/ IV Cont (06.13.19 @ 16:12) >  Mildly dilated and fecalized loops of small bowel with a transition point   at the distal ileum consistent with a small bowel obstruction.    Large pelvic mass with solid and multicystic components causing mild   right hydroureteronephrosis.    Omental caking consistent with metastatic disease.    Moderate ascites.    Filling defects suggested within the right lower lobe segmental pulmonary   arteries. CT angiogram of the chest is recommended to further evaluate   for pulmonary emboli.    < end of copied text >

## 2019-06-15 DIAGNOSIS — E55.9 VITAMIN D DEFICIENCY, UNSPECIFIED: ICD-10-CM

## 2019-06-15 DIAGNOSIS — I67.1 CEREBRAL ANEURYSM, NONRUPTURED: ICD-10-CM

## 2019-06-15 DIAGNOSIS — Z29.9 ENCOUNTER FOR PROPHYLACTIC MEASURES, UNSPECIFIED: ICD-10-CM

## 2019-06-15 DIAGNOSIS — E78.00 PURE HYPERCHOLESTEROLEMIA, UNSPECIFIED: ICD-10-CM

## 2019-06-15 DIAGNOSIS — I10 ESSENTIAL (PRIMARY) HYPERTENSION: ICD-10-CM

## 2019-06-15 LAB
ANION GAP SERPL CALC-SCNC: 14 MMOL/L — SIGNIFICANT CHANGE UP (ref 5–17)
ANION GAP SERPL CALC-SCNC: 15 MMOL/L — SIGNIFICANT CHANGE UP (ref 5–17)
APTT BLD: 116.6 SEC — HIGH (ref 27.5–36.3)
APTT BLD: 28.4 SEC — SIGNIFICANT CHANGE UP (ref 27.5–36.3)
BUN SERPL-MCNC: <4 MG/DL — LOW (ref 7–23)
BUN SERPL-MCNC: <4 MG/DL — LOW (ref 7–23)
CALCIUM SERPL-MCNC: 9.6 MG/DL — SIGNIFICANT CHANGE UP (ref 8.4–10.5)
CALCIUM SERPL-MCNC: 9.9 MG/DL — SIGNIFICANT CHANGE UP (ref 8.4–10.5)
CHLORIDE SERPL-SCNC: 97 MMOL/L — SIGNIFICANT CHANGE UP (ref 96–108)
CHLORIDE SERPL-SCNC: 99 MMOL/L — SIGNIFICANT CHANGE UP (ref 96–108)
CO2 SERPL-SCNC: 25 MMOL/L — SIGNIFICANT CHANGE UP (ref 22–31)
CO2 SERPL-SCNC: 26 MMOL/L — SIGNIFICANT CHANGE UP (ref 22–31)
CREAT SERPL-MCNC: 0.48 MG/DL — LOW (ref 0.5–1.3)
CREAT SERPL-MCNC: 0.5 MG/DL — SIGNIFICANT CHANGE UP (ref 0.5–1.3)
GLUCOSE SERPL-MCNC: 199 MG/DL — HIGH (ref 70–99)
GLUCOSE SERPL-MCNC: 212 MG/DL — HIGH (ref 70–99)
HCT VFR BLD CALC: 37.3 % — SIGNIFICANT CHANGE UP (ref 34.5–45)
HCT VFR BLD CALC: 39.1 % — SIGNIFICANT CHANGE UP (ref 34.5–45)
HGB BLD-MCNC: 13 G/DL — SIGNIFICANT CHANGE UP (ref 11.5–15.5)
HGB BLD-MCNC: 13 G/DL — SIGNIFICANT CHANGE UP (ref 11.5–15.5)
MAGNESIUM SERPL-MCNC: 1.9 MG/DL — SIGNIFICANT CHANGE UP (ref 1.6–2.6)
MAGNESIUM SERPL-MCNC: 2 MG/DL — SIGNIFICANT CHANGE UP (ref 1.6–2.6)
MCHC RBC-ENTMCNC: 31.4 PG — SIGNIFICANT CHANGE UP (ref 27–34)
MCHC RBC-ENTMCNC: 33.2 GM/DL — SIGNIFICANT CHANGE UP (ref 32–36)
MCHC RBC-ENTMCNC: 33.5 PG — SIGNIFICANT CHANGE UP (ref 27–34)
MCHC RBC-ENTMCNC: 34.7 GM/DL — SIGNIFICANT CHANGE UP (ref 32–36)
MCV RBC AUTO: 94.4 FL — SIGNIFICANT CHANGE UP (ref 80–100)
MCV RBC AUTO: 96.4 FL — SIGNIFICANT CHANGE UP (ref 80–100)
PHOSPHATE SERPL-MCNC: 2.8 MG/DL — SIGNIFICANT CHANGE UP (ref 2.5–4.5)
PHOSPHATE SERPL-MCNC: 3.4 MG/DL — SIGNIFICANT CHANGE UP (ref 2.5–4.5)
PLATELET # BLD AUTO: 354 K/UL — SIGNIFICANT CHANGE UP (ref 150–400)
PLATELET # BLD AUTO: 386 K/UL — SIGNIFICANT CHANGE UP (ref 150–400)
POTASSIUM SERPL-MCNC: 3.8 MMOL/L — SIGNIFICANT CHANGE UP (ref 3.5–5.3)
POTASSIUM SERPL-MCNC: 4 MMOL/L — SIGNIFICANT CHANGE UP (ref 3.5–5.3)
POTASSIUM SERPL-SCNC: 3.8 MMOL/L — SIGNIFICANT CHANGE UP (ref 3.5–5.3)
POTASSIUM SERPL-SCNC: 4 MMOL/L — SIGNIFICANT CHANGE UP (ref 3.5–5.3)
RBC # BLD: 3.87 M/UL — SIGNIFICANT CHANGE UP (ref 3.8–5.2)
RBC # BLD: 4.14 M/UL — SIGNIFICANT CHANGE UP (ref 3.8–5.2)
RBC # FLD: 14.6 % — HIGH (ref 10.3–14.5)
RBC # FLD: 14.7 % — HIGH (ref 10.3–14.5)
SODIUM SERPL-SCNC: 137 MMOL/L — SIGNIFICANT CHANGE UP (ref 135–145)
SODIUM SERPL-SCNC: 139 MMOL/L — SIGNIFICANT CHANGE UP (ref 135–145)
WBC # BLD: 5.1 K/UL — SIGNIFICANT CHANGE UP (ref 3.8–10.5)
WBC # BLD: 7.46 K/UL — SIGNIFICANT CHANGE UP (ref 3.8–10.5)
WBC # FLD AUTO: 5.1 K/UL — SIGNIFICANT CHANGE UP (ref 3.8–10.5)
WBC # FLD AUTO: 7.46 K/UL — SIGNIFICANT CHANGE UP (ref 3.8–10.5)

## 2019-06-15 PROCEDURE — 93970 EXTREMITY STUDY: CPT | Mod: 26

## 2019-06-15 RX ORDER — APIXABAN 2.5 MG/1
10 TABLET, FILM COATED ORAL EVERY 12 HOURS
Refills: 0 | Status: DISCONTINUED | OUTPATIENT
Start: 2019-06-15 | End: 2019-06-18

## 2019-06-15 RX ORDER — HYDROCHLOROTHIAZIDE 25 MG
25 TABLET ORAL DAILY
Refills: 0 | Status: DISCONTINUED | OUTPATIENT
Start: 2019-06-15 | End: 2019-06-18

## 2019-06-15 RX ORDER — LOSARTAN POTASSIUM 100 MG/1
100 TABLET, FILM COATED ORAL DAILY
Refills: 0 | Status: DISCONTINUED | OUTPATIENT
Start: 2019-06-15 | End: 2019-06-18

## 2019-06-15 RX ORDER — APIXABAN 2.5 MG/1
2 TABLET, FILM COATED ORAL
Qty: 14 | Refills: 0
Start: 2019-06-15

## 2019-06-15 RX ORDER — HYDROCHLOROTHIAZIDE 25 MG
25 TABLET ORAL DAILY
Refills: 0 | Status: DISCONTINUED | OUTPATIENT
Start: 2019-06-15 | End: 2019-06-15

## 2019-06-15 RX ORDER — LOSARTAN POTASSIUM 100 MG/1
100 TABLET, FILM COATED ORAL DAILY
Refills: 0 | Status: DISCONTINUED | OUTPATIENT
Start: 2019-06-15 | End: 2019-06-15

## 2019-06-15 RX ORDER — ACETAMINOPHEN 500 MG
1000 TABLET ORAL ONCE
Refills: 0 | Status: COMPLETED | OUTPATIENT
Start: 2019-06-15 | End: 2019-06-15

## 2019-06-15 RX ADMIN — Medication 10 MILLIGRAM(S): at 05:24

## 2019-06-15 RX ADMIN — LOSARTAN POTASSIUM 100 MILLIGRAM(S): 100 TABLET, FILM COATED ORAL at 13:14

## 2019-06-15 RX ADMIN — APIXABAN 10 MILLIGRAM(S): 2.5 TABLET, FILM COATED ORAL at 17:32

## 2019-06-15 RX ADMIN — Medication 25 MILLIGRAM(S): at 13:14

## 2019-06-15 RX ADMIN — OCTREOTIDE ACETATE 5 MICROGRAM(S)/HR: 200 INJECTION, SOLUTION INTRAVENOUS; SUBCUTANEOUS at 21:29

## 2019-06-15 RX ADMIN — Medication 10 MILLIGRAM(S): at 21:29

## 2019-06-15 RX ADMIN — HEPARIN SODIUM 13 UNIT(S)/HR: 5000 INJECTION INTRAVENOUS; SUBCUTANEOUS at 13:20

## 2019-06-15 RX ADMIN — Medication 102 MILLIGRAM(S): at 05:22

## 2019-06-15 RX ADMIN — OCTREOTIDE ACETATE 5 MICROGRAM(S)/HR: 200 INJECTION, SOLUTION INTRAVENOUS; SUBCUTANEOUS at 03:59

## 2019-06-15 RX ADMIN — Medication 1000 MILLIGRAM(S): at 04:29

## 2019-06-15 RX ADMIN — OCTREOTIDE ACETATE 5 MICROGRAM(S)/HR: 200 INJECTION, SOLUTION INTRAVENOUS; SUBCUTANEOUS at 16:00

## 2019-06-15 RX ADMIN — HEPARIN SODIUM 9 UNIT(S)/HR: 5000 INJECTION INTRAVENOUS; SUBCUTANEOUS at 05:22

## 2019-06-15 RX ADMIN — Medication 400 MILLIGRAM(S): at 03:59

## 2019-06-15 RX ADMIN — Medication 10 MILLIGRAM(S): at 13:14

## 2019-06-15 NOTE — PROGRESS NOTE ADULT - ASSESSMENT
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  CTPA of chest also revealed pulmonary emboli.

## 2019-06-15 NOTE — CONSULT NOTE ADULT - PROBLEM SELECTOR RECOMMENDATION 3
As above.
Appreciate heme/onc  Advanced stage  Will need to f/u with her outside oncologist prior to resuming chemotherapy

## 2019-06-15 NOTE — CONSULT NOTE ADULT - PROBLEM SELECTOR RECOMMENDATION 9
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  If recurrent N/V, will make NPO again with IVF

## 2019-06-15 NOTE — PROGRESS NOTE ADULT - ASSESSMENT
64F w/ PMH of T2Nx GB adenocarcinoma s/p rxn, now w/ mets to the abd, now p/w malignant SBO      - advance diet to Regular diet  - D/c Heparin gtt and start Eliquis (copay $20)  - malignant SBO protocol  - D/c planning if continues to tolerate diet and GI fxn present     [facility] surgery  [id number]

## 2019-06-15 NOTE — CONSULT NOTE ADULT - ASSESSMENT
64-yo female w/PMHx of gallbladder cancer on chemo, HTN, intracranial aneurysm, Vit D deficiency and HLD, admitted with SBO and b/l PE.  No CT evidence for rt heart strain.
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  CTPA of chest also revealed pulmonary emboli.

## 2019-06-15 NOTE — CONSULT NOTE ADULT - PROBLEM SELECTOR RECOMMENDATION 2
RLL and LLL segmental PE without evidence of rt heart strain on the CTA  On heparin gtt  Ordered TTE and LE venous dopplers  Malignancy is primary risk factor  Will discuss with heme/onc about lovenox vs NOAC RLL and LLL segmental PE without evidence of rt heart strain on the CTA  On heparin gtt  Ordered TTE and LE venous dopplers  Malignancy is primary risk factor  Transition to eliquis

## 2019-06-15 NOTE — PROGRESS NOTE ADULT - SUBJECTIVE AND OBJECTIVE BOX
[facility] Hematology/Oncology - Ml Zhu / Joshua / Braulio - [phone]  Primary Outpatient Hematologist/Oncologist:     Chief Complaint:  Patient is a 64y old  Female who presents with a chief complaint of nausea vomiting obstipation (14 Jun 2019 04:48)      HPI:  64F w/ PMH of cerebral aneurysm, s/p  shunt, HTN, HLD, s/p lap cholecystectomy, path positive for GB adenocarcinoma s/p liver rxn w/ mets to the abd, currently on chemo, now p/w 3 days of n/v, inability to intake PO, no flatus or BM, and abd pain and distension. She vomited once in the AM in ED, otherwise  her nausea improved since admission to ED. Otherwise, Pt denies f/c, cp/SOB, dizziness.     CT abd/Pelvis obtained in ED showing mild SBO w/ TP in the terminal ileum. CT chest showing small b/l PE. Pt refused NGT insertion. Of note, pt's oncologist is Dr. 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   He was treated with adjuvant Ge from 04/17/2/07/18.  CT of the abdomen and pelvis  in 12/18 revealed 6.5 cm lesion in the lower pelvis and right hepatic lobe implant and a toenail nodularity and biopsy In 01/19was positive for metastatic diseaseSocial was started on Gemzar and oxaliplatin however,PET/CT scan on 04/02/19 revealed progressive disease and significant increase in the size of the cystic complement of the large pelvic mass noted on the previous scans also.  She was started on Xeloda on 05/8/19.  She was Scheduled for a follow-up PET/CT scan on 06/17/19. 6/15 pt feeling better and ate this am but still bloated and this week off xeloda this week and counts are ok       ROS:  General:  (-)Pain, (-)Decrease appeite, (-)Fevers, (-)Chills, (-)Weight Loss  Eyes: (-)Blurry Vision, (-)Double Vision, (-)Vision Loss  ENT: (-)Sinus Congestion, (-)Decreased Hearing, (-)Nosebleeds, (-)Sore Throat  Cardiac: (-)Chest Pain, (-)Palpitations, (-)Shortness of breathe on exertion  Respiratory: (-)Cough, (-)hemoptysis, (-)Shortness of breathe  GI: (+)Nausea, (+)Vomiting, (-)Diarrhea, (-)Constipation, (-)Melena, (-)BRBPR  : (-)Hematuria, (-)Dysuria, (-)Polyuia  MSK: (-)Back pain, (-)Joint pain, (-)Stiffness    Neurology:  (-)Numbness, (-)Tingling, (-)Difficulty Walking, (-)Tremors, (-)Weakness  Psych: (-)Anxiety, (-)Depression, (-)Memory Loss  Hematology:  (-)Easy Bruising, (-)Easy Bleeding, (-)Night Sweats.     PAST MEDICAL & SURGICAL HISTORY:  Malignant neoplasm of gallbladder  History of osteoarthritis  Gallstones: dx: &#x27; 2015  Hypercholesterolemia: Borderline. Diet-managed  Multiparity  Hypertension  Vitamin D deficiency  Cerebral aneurysm: &#x27; 2009:  surgically treated  History of cholecystectomy: 12/16  S/P ventricular shunt placement: &#x27; 2009  S/P cerebral aneurysm operation: &#x27; 2009:  Clipping of Cerebral Aneurysm      Social History  Tobacco: Denies current use  Alcohol: Denies current use  Drugs:  Denies current use    FAMILY HISTORY:  CAD (coronary artery disease): Father  MEDICATIONS  (STANDING):  heparin  Infusion 1100 Unit(s)/Hr (13 mL/Hr) IV Continuous <Continuous>  hydrochlorothiazide 25 milliGRAM(s) Oral daily  losartan 100 milliGRAM(s) Oral daily  metoclopramide Injectable 10 milliGRAM(s) IV Push every 8 hours  octreotide  Infusion 25 MICROgram(s)/Hr (5 mL/Hr) IV Continuous <Continuous>  ICU Vital Signs Last 24 Hrs  T(C): 36.8 (15 Arvin 2019 13:00), Max: 37.6 (14 Jun 2019 17:55)  T(F): 98.3 (15 Arvin 2019 13:00), Max: 99.6 (14 Jun 2019 17:55)  HR: 80 (15 Arvin 2019 12:59) (58 - 80)  BP: 156/89 (15 Arvin 2019 12:59) (136/84 - 174/98)  BP(mean): --  ABP: --  ABP(mean): --  RR: 18 (15 Arvin 2019 13:00) (18 - 18)  SpO2: 99% (15 Arvin 2019 13:00) (93% - 99%)      Allergies    No Known Allergies    Intolerances        Physical Exam:  General: AAO x 3. NAD. Lying in bed comfortably.  HEENT: clear oropharynx, anicteric sclera, pink conjunctivae, EOMi. PERRLA  Cardiac: S1, S2 present. No audible murmurs. RRR  Lungs: CTA B/L.   Abdomen: Soft, mild tenderness, Distended. hyperactive BS but bloated still  Extremities: 2+ pulses. No edema  Skin: No Rashes. No petechiae  Neuro: No focal motor or sensory deficits.     Labs:  CBC Full  -  ( 14 Jun 2019 13:22 )  WBC Count : 5.8 K/uL  RBC Count : 3.76 M/uL  Hemoglobin : 12.7 g/dL  Hematocrit : 36.7 %  Platelet Count - Automated : 324 K/uL  Mean Cell Volume : 97.7 fl  Mean Cell Hemoglobin : 33.9 pg  Mean Cell Hemoglobin Concentration : 34.6 gm/dL  Auto Neutrophil # : x  Auto Lymphocyte # : x  Auto Monocyte # : x  Auto Eosinophil # : x  Auto Basophil # : x  Auto Neutrophil % : x  Auto Lymphocyte % : x  Auto Monocyte % : x  Auto Eosinophil % : x  Auto Basophil % : x    06-14    141  |  102  |  5<L>  ----------------------------<  128<H>  3.1<L>   |  28  |  0.56    Ca    9.2      14 Jun 2019 06:00    TPro  7.1  /  Alb  4.0  /  TBili  0.4  /  DBili  x   /  AST  12  /  ALT  5<L>  /  AlkPhos  80  06-13    PT/INR - ( 13 Jun 2019 19:32 )   PT: 13.0 sec;   INR: 1.14 ratio         PTT - ( 14 Jun 2019 13:22 )  PTT:105.5 sec    < from: CT Angio Chest w/ IV Cont (06.13.19 @ 18:45) >  CT Angiography of the Chest.  90 ml of Omnipaque 350 was injected intravenously. 10 ml were discarded.  Sagittal and coronal reformats were performed as well as 3D (MIP)   reconstructions.      FINDINGS:    LUNGS AND AIRWAYS: Patent central airways.  Mild compressive subsegmental   atelectasis in the left lower lobe.    PLEURA: Small left pleural effusion. No pneumothorax.    MEDIASTINUM AND COLTON: No lymphadenopathy.    VESSELS: Acute pulmonary emboli within the right lower lobar artery   extending into right lower lobe segmental pulmonary arteries. Additional   acute pulmonary embolus in a left upper lobe segmental artery.    HEART: Heart size is normal. No CT evidence of right heart strain. No   pericardial effusion.    CHEST WALL AND LOWER NECK: Left sided Mediport.    VISUALIZED UPPER ABDOMEN: Partially visualized ascites, peritoneal   carcinomatosis and right hydronephrosis.     BONES: Degenerative changes.    IMPRESSION:    Acute bilateral pulmonary emboli as above. No evidence of right heart   strain.    < end of copied text >  CT Abdomen and Pelvis w/ IV Cont (06.13.19 @ 16:12) >  Mildly dilated and fecalized loops of small bowel with a transition point   at the distal ileum consistent with a small bowel obstruction.    Large pelvic mass with solid and multicystic components causing mild   right hydroureteronephrosis.    Omental caking consistent with metastatic disease.    Moderate ascites.    Filling defects suggested within the right lower lobe segmental pulmonary   arteries. CT angiogram of the chest is recommended to further evaluate   for pulmonary emboli.    < end of copied text >

## 2019-06-15 NOTE — PROGRESS NOTE ADULT - SUBJECTIVE AND OBJECTIVE BOX
SURGERY DAILY PROGRESS NOTE:       SUBJECTIVE/ROS: Patient examined at bedside. No acute events overnight. Tolerates CLD w/o N/V. +BM/+Flatus. Pain well controlled.          MEDICATIONS  (STANDING):  heparin  Infusion 1100 Unit(s)/Hr (9 mL/Hr) IV Continuous <Continuous>  metoclopramide Injectable 10 milliGRAM(s) IV Push every 8 hours  octreotide  Infusion 25 MICROgram(s)/Hr (5 mL/Hr) IV Continuous <Continuous>    MEDICATIONS  (PRN):      OBJECTIVE:    Vital Signs Last 24 Hrs  T(C): 36.4 (15 Arvin 2019 09:42), Max: 37.6 (14 Jun 2019 17:55)  T(F): 97.5 (15 Arvin 2019 09:42), Max: 99.6 (14 Jun 2019 17:55)  HR: 66 (15 Arvin 2019 09:42) (58 - 73)  BP: 153/86 (15 Arvin 2019 09:42) (136/84 - 174/98)  BP(mean): --  RR: 18 (15 Arvin 2019 09:42) (18 - 18)  SpO2: 96% (15 Arvin 2019 09:42) (93% - 98%)        I&O's Detail    14 Jun 2019 07:01  -  15 Arvin 2019 07:00  --------------------------------------------------------  IN:    dextrose 5% + sodium chloride 0.45% with potassium chloride 20 mEq/L: 1050 mL    heparin  Infusion.: 154 mL    heparin Infusion: 18 mL    heparin Infusion: 71 mL    lactated ringers.: 875 mL    octreotide  Infusion: 60 mL    Oral Fluid: 940 mL    Solution: 100 mL  Total IN: 3268 mL    OUT:    Voided: 550 mL  Total OUT: 550 mL    Total NET: 2718 mL      15 Arvin 2019 07:01  -  15 Arvin 2019 11:43  --------------------------------------------------------  IN:    Oral Fluid: 340 mL  Total IN: 340 mL    OUT:    Voided: 200 mL  Total OUT: 200 mL    Total NET: 140 mL          Daily     Daily     LABS:                        13.0   5.1   )-----------( 354      ( 15 Arvin 2019 03:59 )             37.3     06-15    139  |  99  |  <4<L>  ----------------------------<  212<H>  3.8   |  26  |  0.50    Ca    9.6      15 Arvin 2019 03:59  Phos  3.4     06-15  Mg     1.9     06-15    TPro  7.1  /  Alb  4.0  /  TBili  0.4  /  DBili  x   /  AST  12  /  ALT  5<L>  /  AlkPhos  80  06-13    PT/INR - ( 13 Jun 2019 19:32 )   PT: 13.0 sec;   INR: 1.14 ratio         PTT - ( 15 Arvin 2019 03:59 )  PTT:116.6 sec  Urinalysis Basic - ( 13 Jun 2019 16:50 )    Color: Light Yellow / Appearance: Clear / SG: >1.050 / pH: x  Gluc: x / Ketone: Trace  / Bili: Negative / Urobili: Negative   Blood: x / Protein: Trace / Nitrite: Negative   Leuk Esterase: Negative / RBC: 4 /hpf / WBC 1 /HPF   Sq Epi: x / Non Sq Epi: 0 /hpf / Bacteria: Negative                Physical Exam:   General: NAD  Abdomen: soft, distended, not tender, RUQ well healed incision

## 2019-06-15 NOTE — PROVIDER CONTACT NOTE (MEDICATION) - REASON
Unable to obtain heparin lab values @0233 for q6h because pt was difficult stick; obtained closer to 0500

## 2019-06-15 NOTE — CONSULT NOTE ADULT - SUBJECTIVE AND OBJECTIVE BOX
Ms. Yanni Spencer who is a 64 year old woman who had been experiencing post prandial right upper quadrant pain. 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 (T2;Nx).    She underwent a hepatic resection (2/13/2017) and had SADIE in the liver nor in a LN.     Started [facility] treatment with GEMZAR on 4/6/17 and completed it on 7/27/2018.  CT scans from 3/16/2018 are stable. She did not RTC as requested.     C/O RLQ pain,saw GYN  who ordered a US.     She  saw Dr. Barton who ordered a CT of Abd'Pelvis 12/21/2018 --6.5 cm lesion in lower pelvis and  right hepatic lobe implant as well as peritoneal nodularity.    PET' CT and MRI done on Dec. 27, 2018 showed the same lesions; the pelvic lesion was the only one that wasn't FDG avid. CT guided bx (1/10/2019) showed a moderately to poorly differentiated adenocarcinoma similar to previous carcinoma.    Started on GEMZAR/OXALI with Neulasta; on 1/31/2019.    Saw Dr Peña (urologist) and then Gyn Onc (Dr Dominique) for Bladder pressure & pain with urgency due to pelvic mass; managed with percocet.     S/p cycle 2B. PET-CT (4/2/2019) shows progression.    Started XELODA on 5/8/19. CA 19-9 has dropped from 21,726 to 17,007 from 3/14 to 4/29.    June 10th 2019 was cycle 3 day 20.  She reports low back pain and abdominal pain; the pain can be as bad as 10/10 but with 1 Percocet drops to 2/10. PET-CT scan scheduled for 6/17/2019.    Pt presented to [facility] ED on 6/13/19 with 3 days of n/v, inability to intake PO, no flatus or BM, and abd pain and distension. She has vomited once in the AM in ED, otherwise states her nausea has improved since admission to ED. Otherwise, Pt denies f/c, cp/SOB, dizziness.     CT abd/pelv obtained in ED showing mild SBO w/ TP in the terminal ileum. CT chest showing small b/l PE. Pt adamantly refused NGT insertion at this time.  Started on heparin gtt.     Past Medical History:     Reviewed history from 01/25/2017 and no changes required:        Gallbladder Cancer        Hypertension        intracranial aneurysm        low vitamin D        Hematuria        Hyperlipidemia    Past Surgical History:     Reviewed history from 03/04/2019 and no changes required:        S/P Cholecystectomy        Liver rescection        Peritoneal shunt        s/p aneurysm clip and shunt        Social History:     Reviewed history from 08/07/2018 and no changes required:                        Smoking History:        Patient is a former smoker.      Risk Factors:     Smoked Tobacco Use:  Former smoker     Cigarettes:  Yes        Year quit:  2009        Years Since Last Quit:  10  Smokeless Tobacco Use:  Never     Counseled to quit/cut down:  no  Passive smoke exposure:  no  Drug use:  no  HIV high-risk behavior:  no  Caffeine use:  4 drinks per day  Alcohol use:  no  Exercise:  no  Seatbelt use:  100 %  Sun Exposure:  rarely    Previous Tobacco Use: Signed On - 05/13/2019  Smoked Tobacco Use:  Former smoker     Cigarettes:  Yes        Year quit:  2009        Years Since Last Quit:  10 years, 5 months, 9 days  Smokeless Tobacco Use:  Never     Counseled to quit/cut down:  no  Passive smoke exposure:  no  Drug use:  no  HIV high-risk behavior:  no  Caffeine use:  4 drinks per day    Previous Alcohol Use: Signed On - 05/13/2019  Alcohol use:  no  Exercise:  no  Seatbelt use:  100 %  Sun Exposure:  rarely    Mammogram History:     Date of Last Mammogram:  11/22/2017 Ms. Yanni Spencer who is a 64 year old woman who had been experiencing post prandial right upper quadrant pain. 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 (T2;Nx).    She underwent a hepatic resection (2/13/2017) and had SADIE in the liver nor in a LN.     Started [facility] treatment with GEMZAR on 4/6/17 and completed it on 7/27/2018.  CT scans from 3/16/2018 are stable. She did not RTC as requested.     C/O RLQ pain,saw GYN  who ordered a US.     She  saw Dr. Barton who ordered a CT of Abd'Pelvis 12/21/2018 --6.5 cm lesion in lower pelvis and  right hepatic lobe implant as well as peritoneal nodularity.    PET' CT and MRI done on Dec. 27, 2018 showed the same lesions; the pelvic lesion was the only one that wasn't FDG avid. CT guided bx (1/10/2019) showed a moderately to poorly differentiated adenocarcinoma similar to previous carcinoma.    Started on GEMZAR/OXALI with Neulasta; on 1/31/2019.    Saw Dr Peña (urologist) and then Gyn Onc (Dr Dominique) for Bladder pressure & pain with urgency due to pelvic mass; managed with percocet.     S/p cycle 2B. PET-CT (4/2/2019) shows progression.    Started XELODA on 5/8/19. CA 19-9 has dropped from 21,726 to 17,007 from 3/14 to 4/29.    June 10th 2019 was cycle 3 day 20.  She reports low back pain and abdominal pain; the pain can be as bad as 10/10 but with 1 Percocet drops to 2/10. PET-CT scan scheduled for 6/17/2019.    Pt presented to [facility] ED on 6/13/19 with 3 days of n/v, inability to intake PO, no flatus or BM, and abd pain and distension. She has vomited once in the AM in ED, otherwise states her nausea has improved since admission to ED. Otherwise, Pt denies f/c, cp/SOB, dizziness.     CT abd/pelv obtained in ED showing mild SBO w/ TP in the terminal ileum. CT chest showing small b/l PE. Pt adamantly refused NGT insertion at this time.  Started on heparin gtt.  Pt restarted on diet 6/15/19.    Past Medical History:     Reviewed history from 01/25/2017 and no changes required:        Gallbladder Cancer        Hypertension        intracranial aneurysm        low vitamin D        Hematuria        Hyperlipidemia    Past Surgical History:     Reviewed history from 03/04/2019 and no changes required:        S/P Cholecystectomy        Liver rescection        Peritoneal shunt        s/p aneurysm clip and shunt        Social History:     Reviewed history from 08/07/2018 and no changes required:                        Smoking History:        Patient is a former smoker.    Smoked Tobacco Use:  Former smoker     Cigarettes:  Yes        Year quit:  2009        Years Since Last Quit:  10  Smokeless Tobacco Use:  Never     Counseled to quit/cut down:  no  Passive smoke exposure:  no  Drug use:  no  HIV high-risk behavior:  no  Caffeine use:  4 drinks per day  Alcohol use:  no  Exercise:  no  Seatbelt use:  100 %  Sun Exposure:  rarely    Previous Tobacco Use: Signed On - 05/13/2019  Smoked Tobacco Use:  Former smoker     Cigarettes:  Yes        Year quit:  2009        Years Since Last Quit:  10 years, 5 months, 9 days  Smokeless Tobacco Use:  Never     Counseled to quit/cut down:  no  Passive smoke exposure:  no  Drug use:  no  HIV high-risk behavior:  no  Caffeine use:  4 drinks per day    Previous Alcohol Use: Signed On - 05/13/2019  Alcohol use:  no  Exercise:  no  Seatbelt use:  100 %  Sun Exposure:  rarely    Mammogram History:     Date of Last Mammogram:  11/22/2017      REVIEW OF SYSTEMS:    CONSTITUTIONAL: (-) dizziness (+) weakness (-) fevers (-) chills (-) weight loss (-) weight gain (-) sweats (-) poor appetite (-) falls  HEENT: (-) visual changes (-) HA (-) vertigo (-) rhinorrhea (-) epistaxis (-) swelling (-) hearing changes (-) sore throat (-) hoarseness  NECK: (-) stiffness (-) pain  RESPIRATORY: (-) cough (-) SOB (-) WISEMAN (-) hemoptysis   CARDIOVASCULAR: (-) chest pain (-) palpitations (-) PND (-) orthopnea  GASTROINTESTINAL: (-) abd pain (-) nausea (-) vomiting (-) diarrhea (-) constipation (-) melena (-) BRBPR (-) dysphagia (-) odynophagia (-) incontinence (-) bloatedness  GENITOURINARY: (-) dysuria  (-) frequency (-) hematuria (-) incontinence (-) abnormal discharge (-) incomplete emptying (-) flank pain  NEUROLOGICAL: (-) confusion (-) slurred speech (-) focal weakness (-) tingling/numbness (-) difficulty walking (-) tremors  MUSCULOSKELETAL: (-) back pain (-) joint pain (-) joint swelling (-) reduced ROM (-) extremity pain (-) extremity swelling  PSYCH: (-) anxious (-) depressed (-) aural hallucinations (-) visual hallucinations  SKIN: (-) itching (-) burning (-) rashes (-) swelling (-) bruising (-) pain  All other review of systems is negative unless indicated above.    Vital Signs Last 24 Hrs  T(C): 36.4 (15 Arvin 2019 09:42), Max: 37.6 (14 Jun 2019 17:55)  T(F): 97.5 (15 Arvin 2019 09:42), Max: 99.6 (14 Jun 2019 17:55)  HR: 66 (15 Arvin 2019 09:42) (58 - 73)  BP: 153/86 (15 Arvin 2019 09:42) (136/84 - 174/98)  BP(mean): --  RR: 18 (15 Arvin 2019 09:42) (18 - 18)  SpO2: 96% (15 Arvin 2019 09:42) (93% - 98%)    GENERAL: NAD  HEAD:  Atraumatic, Normocephalic  EYES: EOMI, PERRLA, conjunctiva and sclera clear  MOUTH/THROAT: no swelling, no erythema, no lesions, no exudates  NECK: Supple, No JVD, No LAD, No carotid bruits  CHEST/LUNG: Clear to auscultation bilaterally; No wheezes or rales  HEART: Regular rate and rhythm; nl S1/S2; No murmurs, rubs, or gallops  ABDOMEN: Soft, (+)tender RUQ, Nondistended; Bowel sounds present  EXTREMITIES:  2+ Peripheral Pulses; No clubbing, cyanosis, or edema b/l  SKIN: No rashes or lesions; No jaundice  NEURO: A+O x 3; nonfocal CN/motor/sensory/reflexes  PSYCH: Nl affect; no delirium or agitation; no suicidal/homicidal ideation        LABS:                        13.0   5.1   )-----------( 354      ( 15 Arvin 2019 03:59 )             37.3     06-15    139  |  99  |  <4<L>  ----------------------------<  212<H>  3.8   |  26  |  0.50    Ca    9.6      15 Arvin 2019 03:59  Phos  3.4     06-15  Mg     1.9     06-15    TPro  7.1  /  Alb  4.0  /  TBili  0.4  /  DBili  x   /  AST  12  /  ALT  5<L>  /  AlkPhos  80  06-13    PT/INR - ( 13 Jun 2019 19:32 )   PT: 13.0 sec;   INR: 1.14 ratio         PTT - ( 15 Arvin 2019 03:59 )  PTT:116.6 sec  CAPILLARY BLOOD GLUCOSE            Urinalysis Basic - ( 13 Jun 2019 16:50 )    Color: Light Yellow / Appearance: Clear / SG: >1.050 / pH: x  Gluc: x / Ketone: Trace  / Bili: Negative / Urobili: Negative   Blood: x / Protein: Trace / Nitrite: Negative   Leuk Esterase: Negative / RBC: 4 /hpf / WBC 1 /HPF   Sq Epi: x / Non Sq Epi: 0 /hpf / Bacteria: Negative

## 2019-06-15 NOTE — CONSULT NOTE ADULT - ATTENDING COMMENTS
Will continue to follow with you.          Tr Etienne M.D.  [facility]   [phone] Will continue to follow with you.  May transfer to my service over weekend.          Tr Etienne M.D.  [facility]   [phone]

## 2019-06-16 RX ORDER — SENNA PLUS 8.6 MG/1
2 TABLET ORAL AT BEDTIME
Refills: 0 | Status: DISCONTINUED | OUTPATIENT
Start: 2019-06-16 | End: 2019-06-18

## 2019-06-16 RX ORDER — ACETAMINOPHEN 500 MG
650 TABLET ORAL EVERY 6 HOURS
Refills: 0 | Status: DISCONTINUED | OUTPATIENT
Start: 2019-06-16 | End: 2019-06-18

## 2019-06-16 RX ADMIN — Medication 10 MILLIGRAM(S): at 05:24

## 2019-06-16 RX ADMIN — APIXABAN 10 MILLIGRAM(S): 2.5 TABLET, FILM COATED ORAL at 05:24

## 2019-06-16 RX ADMIN — Medication 10 MILLIGRAM(S): at 14:00

## 2019-06-16 RX ADMIN — Medication 650 MILLIGRAM(S): at 20:32

## 2019-06-16 RX ADMIN — APIXABAN 10 MILLIGRAM(S): 2.5 TABLET, FILM COATED ORAL at 17:58

## 2019-06-16 RX ADMIN — Medication 650 MILLIGRAM(S): at 21:02

## 2019-06-16 RX ADMIN — Medication 10 MILLIGRAM(S): at 22:02

## 2019-06-16 RX ADMIN — SENNA PLUS 2 TABLET(S): 8.6 TABLET ORAL at 22:02

## 2019-06-16 RX ADMIN — Medication 25 MILLIGRAM(S): at 05:24

## 2019-06-16 RX ADMIN — OCTREOTIDE ACETATE 5 MICROGRAM(S)/HR: 200 INJECTION, SOLUTION INTRAVENOUS; SUBCUTANEOUS at 10:11

## 2019-06-16 RX ADMIN — LOSARTAN POTASSIUM 100 MILLIGRAM(S): 100 TABLET, FILM COATED ORAL at 05:24

## 2019-06-16 NOTE — PROGRESS NOTE ADULT - SUBJECTIVE AND OBJECTIVE BOX
SURGERY DAILY PROGRESS NOTE:       SUBJECTIVE/ROS: Patient examined at bedside. No acute events overnight. Tolerates regualr w/o N/V. +BM/+Flatus. Denies pain.          MEDICATIONS  (STANDING):  apixaban 10 milliGRAM(s) Oral every 12 hours  hydrochlorothiazide 25 milliGRAM(s) Oral daily  losartan 100 milliGRAM(s) Oral daily  metoclopramide Injectable 10 milliGRAM(s) IV Push every 8 hours  octreotide  Infusion 25 MICROgram(s)/Hr (5 mL/Hr) IV Continuous <Continuous>    MEDICATIONS  (PRN):      OBJECTIVE:    Vital Signs Last 24 Hrs  T(C): 36.7 (16 Jun 2019 00:50), Max: 36.9 (15 Arvin 2019 21:50)  T(F): 98 (16 Jun 2019 00:50), Max: 98.4 (15 Arvin 2019 21:50)  HR: 80 (16 Jun 2019 00:50) (63 - 82)  BP: 150/88 (16 Jun 2019 00:50) (136/84 - 158/82)  BP(mean): --  RR: 18 (16 Jun 2019 00:50) (18 - 18)  SpO2: 98% (16 Jun 2019 00:50) (96% - 99%)        I&O's Detail    14 Jun 2019 07:01  -  15 Arvin 2019 07:00  --------------------------------------------------------  IN:    dextrose 5% + sodium chloride 0.45% with potassium chloride 20 mEq/L: 1050 mL    heparin  Infusion.: 154 mL    heparin Infusion: 18 mL    heparin Infusion: 71 mL    lactated ringers.: 875 mL    octreotide  Infusion: 60 mL    Oral Fluid: 940 mL    Solution: 100 mL  Total IN: 3268 mL    OUT:    Voided: 550 mL  Total OUT: 550 mL    Total NET: 2718 mL      15 Arvin 2019 07:01  -  16 Jun 2019 02:13  --------------------------------------------------------  IN:    dextrose 5% + sodium chloride 0.45% with potassium chloride 20 mEq/L: 225 mL    heparin Infusion: 110 mL    octreotide  Infusion: 60 mL    Oral Fluid: 1400 mL  Total IN: 1795 mL    OUT:    Voided: 1850 mL  Total OUT: 1850 mL    Total NET: -55 mL          Daily     Daily     LABS:                        13.0   7.46  )-----------( 386      ( 15 Arvin 2019 18:03 )             39.1     06-15    137  |  97  |  <4<L>  ----------------------------<  199<H>  4.0   |  25  |  0.48<L>    Ca    9.9      15 Arvin 2019 12:22  Phos  2.8     06-15  Mg     2.0     06-15      PTT - ( 15 Arvin 2019 12:22 )  PTT:28.4 sec        Physical Exam:   General: NAD  Abdomen: soft, mildly distended, not tender, RUQ well healed incision SURGERY DAILY PROGRESS NOTE:       SUBJECTIVE/ROS: Patient examined at bedside. No acute events overnight. Tolerates regualr w/o N/V. No BM, minimal flatus. Feels more distended. Denies pain.          MEDICATIONS  (STANDING):  apixaban 10 milliGRAM(s) Oral every 12 hours  hydrochlorothiazide 25 milliGRAM(s) Oral daily  losartan 100 milliGRAM(s) Oral daily  metoclopramide Injectable 10 milliGRAM(s) IV Push every 8 hours  octreotide  Infusion 25 MICROgram(s)/Hr (5 mL/Hr) IV Continuous <Continuous>    MEDICATIONS  (PRN):      OBJECTIVE:    Vital Signs Last 24 Hrs  T(C): 36.7 (16 Jun 2019 00:50), Max: 36.9 (15 Arvin 2019 21:50)  T(F): 98 (16 Jun 2019 00:50), Max: 98.4 (15 Arvin 2019 21:50)  HR: 80 (16 Jun 2019 00:50) (63 - 82)  BP: 150/88 (16 Jun 2019 00:50) (136/84 - 158/82)  BP(mean): --  RR: 18 (16 Jun 2019 00:50) (18 - 18)  SpO2: 98% (16 Jun 2019 00:50) (96% - 99%)        I&O's Detail    14 Jun 2019 07:01  -  15 Arvin 2019 07:00  --------------------------------------------------------  IN:    dextrose 5% + sodium chloride 0.45% with potassium chloride 20 mEq/L: 1050 mL    heparin  Infusion.: 154 mL    heparin Infusion: 18 mL    heparin Infusion: 71 mL    lactated ringers.: 875 mL    octreotide  Infusion: 60 mL    Oral Fluid: 940 mL    Solution: 100 mL  Total IN: 3268 mL    OUT:    Voided: 550 mL  Total OUT: 550 mL    Total NET: 2718 mL      15 Arvin 2019 07:01  -  16 Jun 2019 02:13  --------------------------------------------------------  IN:    dextrose 5% + sodium chloride 0.45% with potassium chloride 20 mEq/L: 225 mL    heparin Infusion: 110 mL    octreotide  Infusion: 60 mL    Oral Fluid: 1400 mL  Total IN: 1795 mL    OUT:    Voided: 1850 mL  Total OUT: 1850 mL    Total NET: -55 mL          Daily     Daily     LABS:                        13.0   7.46  )-----------( 386      ( 15 Arvin 2019 18:03 )             39.1     06-15    137  |  97  |  <4<L>  ----------------------------<  199<H>  4.0   |  25  |  0.48<L>    Ca    9.9      15 Arvin 2019 12:22  Phos  2.8     06-15  Mg     2.0     06-15      PTT - ( 15 Arvin 2019 12:22 )  PTT:28.4 sec        Physical Exam:   General: NAD  Abdomen: soft, mildly distended, not tender, RUQ well healed incision

## 2019-06-16 NOTE — PROGRESS NOTE ADULT - SUBJECTIVE AND OBJECTIVE BOX
[facility] Hematology/Oncology - Ml Zhu / Joshua / Braulio - [phone]  Primary Outpatient Hematologist/Oncologist:     Chief Complaint:  Patient is a 64y old  Female who presents with a chief complaint of nausea vomiting obstipation (14 Jun 2019 04:48)      HPI:  64F w/ PMH of cerebral aneurysm, s/p  shunt, HTN, HLD, s/p lap cholecystectomy, path positive for GB adenocarcinoma s/p liver rxn w/ mets to the abd, currently on chemo, now p/w 3 days of n/v, inability to intake PO, no flatus or BM, and abd pain and distension. She vomited once in the AM in ED, otherwise  her nausea improved since admission to ED. Otherwise, Pt denies f/c, cp/SOB, dizziness.     CT abd/Pelvis obtained in ED showing mild SBO w/ TP in the terminal ileum. CT chest showing small b/l PE. Pt refused NGT insertion. Of note, pt's oncologist is Dr. 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   He was treated with adjuvant Ge from 04/17/2/07/18.  CT of the abdomen and pelvis  in 12/18 revealed 6.5 cm lesion in the lower pelvis and right hepatic lobe implant and a toenail nodularity and biopsy In 01/19was positive for metastatic diseaseSocial was started on Gemzar and oxaliplatin however,PET/CT scan on 04/02/19 revealed progressive disease and significant increase in the size of the cystic complement of the large pelvic mass noted on the previous scans also.  She was started on Xeloda on 05/8/19.  She was Scheduled for a follow-up PET/CT scan on 06/17/19. 6/15 pt feeling better and ate this am but still bloated and this week off xeloda this week and counts are ok     6/16 conservative non surgical approach for now and pt walking in the ha    ROS:  General:  (-)Pain, (-)Decrease appeite, (-)Fevers, (-)Chills, (-)Weight Loss  Eyes: (-)Blurry Vision, (-)Double Vision, (-)Vision Loss  ENT: (-)Sinus Congestion, (-)Decreased Hearing, (-)Nosebleeds, (-)Sore Throat  Cardiac: (-)Chest Pain, (-)Palpitations, (-)Shortness of breathe on exertion  Respiratory: (-)Cough, (-)hemoptysis, (-)Shortness of breathe  GI: (+)Nausea, (+)Vomiting, (-)Diarrhea, (-)Constipation, (-)Melena, (-)BRBPR  : (-)Hematuria, (-)Dysuria, (-)Polyuia  MSK: (-)Back pain, (-)Joint pain, (-)Stiffness    Neurology:  (-)Numbness, (-)Tingling, (-)Difficulty Walking, (-)Tremors, (-)Weakness  Psych: (-)Anxiety, (-)Depression, (-)Memory Loss  Hematology:  (-)Easy Bruising, (-)Easy Bleeding, (-)Night Sweats.     PAST MEDICAL & SURGICAL HISTORY:  Malignant neoplasm of gallbladder  History of osteoarthritis  Gallstones: dx: &#x27; 2015  Hypercholesterolemia: Borderline. Diet-managed  Multiparity  Hypertension  Vitamin D deficiency  Cerebral aneurysm: &#x27; 2009:  surgically treated  History of cholecystectomy: 12/16  S/P ventricular shunt placement: &#x27; 2009  S/P cerebral aneurysm operation: &#x27; 2009:  Clipping of Cerebral Aneurysm      Social History  Tobacco: Denies current use  Alcohol: Denies current use  Drugs:  Denies current use    FAMILY HISTORY:  CAD (coronary artery disease): Father  MEDICATIONS  (STANDING):  heparin  Infusion 1100 Unit(s)/Hr (13 mL/Hr) IV Continuous <Continuous>  hydrochlorothiazide 25 milliGRAM(s) Oral daily  losartan 100 milliGRAM(s) Oral daily  metoclopramide Injectable 10 milliGRAM(s) IV Push every 8 hours  octreotide  Infusion 25 MICROgram(s)/Hr (5 mL/Hr) IV Continuous <Continuous>  ICU Vital Signs Last 24 Hrs  T(C): 36.8 (15 Arvin 2019 13:00), Max: 37.6 (14 Jun 2019 17:55)  T(F): 98.3 (15 Arvin 2019 13:00), Max: 99.6 (14 Jun 2019 17:55)  HR: 80 (15 Arvin 2019 12:59) (58 - 80)  BP: 156/89 (15 Arvin 2019 12:59) (136/84 - 174/98)  BP(mean): --  ABP: --  ABP(mean): --  RR: 18 (15 Arvin 2019 13:00) (18 - 18)  SpO2: 99% (15 Arvin 2019 13:00) (93% - 99%)      Allergies    No Known Allergies    Intolerances        Physical Exam:  General: AAO x 3. NAD. Lying in bed comfortably.  HEENT: clear oropharynx, anicteric sclera, pink conjunctivae, EOMi. PERRLA  Cardiac: S1, S2 present. No audible murmurs. RRR  Lungs: CTA B/L.   Abdomen: Soft, mild tenderness, Distended. hyperactive BS but bloated still  Extremities: 2+ pulses. No edema  Skin: No Rashes. No petechiae  Neuro: No focal motor or sensory deficits.     Labs:  CBC Full  -  ( 14 Jun 2019 13:22 )  WBC Count : 5.8 K/uL  RBC Count : 3.76 M/uL  Hemoglobin : 12.7 g/dL  Hematocrit : 36.7 %  Platelet Count - Automated : 324 K/uL  Mean Cell Volume : 97.7 fl  Mean Cell Hemoglobin : 33.9 pg  Mean Cell Hemoglobin Concentration : 34.6 gm/dL  Auto Neutrophil # : x  Auto Lymphocyte # : x  Auto Monocyte # : x  Auto Eosinophil # : x  Auto Basophil # : x  Auto Neutrophil % : x  Auto Lymphocyte % : x  Auto Monocyte % : x  Auto Eosinophil % : x  Auto Basophil % : x    06-14    141  |  102  |  5<L>  ----------------------------<  128<H>  3.1<L>   |  28  |  0.56    Ca    9.2      14 Jun 2019 06:00    TPro  7.1  /  Alb  4.0  /  TBili  0.4  /  DBili  x   /  AST  12  /  ALT  5<L>  /  AlkPhos  80  06-13    PT/INR - ( 13 Jun 2019 19:32 )   PT: 13.0 sec;   INR: 1.14 ratio         PTT - ( 14 Jun 2019 13:22 )  PTT:105.5 sec    < from: CT Angio Chest w/ IV Cont (06.13.19 @ 18:45) >  CT Angiography of the Chest.  90 ml of Omnipaque 350 was injected intravenously. 10 ml were discarded.  Sagittal and coronal reformats were performed as well as 3D (MIP)   reconstructions.      FINDINGS:    LUNGS AND AIRWAYS: Patent central airways.  Mild compressive subsegmental   atelectasis in the left lower lobe.    PLEURA: Small left pleural effusion. No pneumothorax.    MEDIASTINUM AND COLTON: No lymphadenopathy.    VESSELS: Acute pulmonary emboli within the right lower lobar artery   extending into right lower lobe segmental pulmonary arteries. Additional   acute pulmonary embolus in a left upper lobe segmental artery.    HEART: Heart size is normal. No CT evidence of right heart strain. No   pericardial effusion.    CHEST WALL AND LOWER NECK: Left sided Mediport.    VISUALIZED UPPER ABDOMEN: Partially visualized ascites, peritoneal   carcinomatosis and right hydronephrosis.     BONES: Degenerative changes.    IMPRESSION:    Acute bilateral pulmonary emboli as above. No evidence of right heart   strain.    < end of copied text >  CT Abdomen and Pelvis w/ IV Cont (06.13.19 @ 16:12) >  Mildly dilated and fecalized loops of small bowel with a transition point   at the distal ileum consistent with a small bowel obstruction.    Large pelvic mass with solid and multicystic components causing mild   right hydroureteronephrosis.    Omental caking consistent with metastatic disease.    Moderate ascites.    Filling defects suggested within the right lower lobe segmental pulmonary   arteries. CT angiogram of the chest is recommended to further evaluate   for pulmonary emboli.    < end of copied text >

## 2019-06-16 NOTE — PROGRESS NOTE ADULT - SUBJECTIVE AND OBJECTIVE BOX
She remains on octreotide and IV reglan  BP still high but she was restarted on HCTZ and losartan 6/15/19  She says that she still feels a little queasiness in her abdomenal area even when not eating  Minimal flatus overnight  No BMs last night or this morning  Denies CP or SOB  She ambulated around the hallway 3x yesterday and only then did she feel a little dyspneic    Vital Signs Last 24 Hrs  T(C): 36.7 (16 Jun 2019 09:12), Max: 37.1 (16 Jun 2019 04:52)  T(F): 98 (16 Jun 2019 09:12), Max: 98.7 (16 Jun 2019 04:52)  HR: 61 (16 Jun 2019 09:12) (56 - 82)  BP: 146/76 (16 Jun 2019 09:12) (132/74 - 158/82)  BP(mean): --  RR: 18 (16 Jun 2019 09:12) (18 - 18)  SpO2: 96% (16 Jun 2019 09:12) (96% - 99%)      GENERAL: NAD  HEAD:  Atraumatic, Normocephalic  EYES: EOMI, PERRLA, conjunctiva and sclera clear  MOUTH/THROAT: no swelling, no erythema, no lesions, no exudates  NECK: Supple, No JVD, No LAD, No carotid bruits  CHEST/LUNG: Clear to auscultation bilaterally; No wheezes or rales  HEART: Regular rate and rhythm; nl S1/S2; No murmurs, rubs, or gallops  ABDOMEN: Soft, (+)tender RUQ, Nondistended; Bowel sounds present  EXTREMITIES:  2+ Peripheral Pulses; No clubbing, cyanosis, or edema b/l  SKIN: No rashes or lesions; No jaundice  NEURO: A+O x 3; nonfocal CN/motor/sensory/reflexes  PSYCH: Nl affect; no delirium or agitation; no suicidal/homicidal ideation    LABS:                        13.0   7.46  )-----------( 386      ( 15 Arvin 2019 18:03 )             39.1     06-15    137  |  97  |  <4<L>  ----------------------------<  199<H>  4.0   |  25  |  0.48<L>    Ca    9.9      15 Arvin 2019 12:22  Phos  2.8     06-15  Mg     2.0     06-15      PTT - ( 15 Arvin 2019 12:22 )  PTT:28.4 sec  CAPILLARY BLOOD GLUCOSE      POCT Blood Glucose.: 170 mg/dL (15 Arvin 2019 22:29)

## 2019-06-16 NOTE — PROGRESS NOTE ADULT - ASSESSMENT
64-yo female w/PMHx of gallbladder cancer on chemo, HTN, intracranial aneurysm, Vit D deficiency and HLD, admitted with SBO and b/l PE.  No CT evidence for rt heart strain.    Problem/Recommendation - 1:  Problem: SBO (small bowel obstruction). Recommendation: Malignant SBO with transition point distal ileum  Appreciate surgical care  On octreotide gtt/reglan IV  Diet has been resumed  Monitor abd exam serially  Monitor for flatus, BMs.  ?portable upright AXR today?    Problem/Recommendation - 2:  ·  Problem: Pulmonary embolism.  Recommendation: RLL and LLL segmental PE without evidence of rt heart strain on the CTA  On heparin gtt  Ordered TTE (not done yet) and LE venous dopplers (done but not read)  Malignancy is primary risk factor  On eliquis.    Problem/Recommendation - 3:  ·  Problem: Malignant neoplasm of gallbladder.  Recommendation: Appreciate heme/onc  Advanced stage  Will need to f/u with her outside oncologist prior to resuming chemotherapy.     Problem/Recommendation - 4:  ·  Problem: Hypercholesterolemia.  Recommendation: Not currently on any statins.     Problem/Recommendation - 5:  ·  Problem: Hypertension.  Recommendation: Continue ARB and HCTZ.    Problem/Recommendation - 6:  Problem: Cerebral aneurysm. Recommendation: Not an active inpt issue.    Problem/Recommendation - 7:  Problem: Vitamin D deficiency. Recommendation: She is on Vit D supplementation at home.    Problem/Recommendation - 8:  Problem: Need for prophylactic measure. Recommendation: On eliquis.        Tr Etienne M.D.  [facility]   [phone]

## 2019-06-16 NOTE — PROGRESS NOTE ADULT - ASSESSMENT
64F w/ PMH of T2Nx GB adenocarcinoma s/p rxn, now w/ mets to the abd, now p/w malignant SBO. Pt chosen non -op trt w/ malignant SBO protocol. SBO resolving.       - C/w Regular diet  - C/w  Eliquis (copay $20) for newly diagnosed PE  - C/w malignant SBO protocol  - D/c planning if continues to tolerate diet and GI fxn present     [facility] surgery  [id number]

## 2019-06-17 RX ORDER — ACETAMINOPHEN 500 MG
1000 TABLET ORAL ONCE
Refills: 0 | Status: COMPLETED | OUTPATIENT
Start: 2019-06-17 | End: 2019-06-17

## 2019-06-17 RX ORDER — DEXAMETHASONE 0.5 MG/5ML
10 ELIXIR ORAL EVERY 8 HOURS
Refills: 0 | Status: COMPLETED | OUTPATIENT
Start: 2019-06-17 | End: 2019-06-18

## 2019-06-17 RX ADMIN — Medication 10 MILLIGRAM(S): at 06:02

## 2019-06-17 RX ADMIN — Medication 10 MILLIGRAM(S): at 13:31

## 2019-06-17 RX ADMIN — OCTREOTIDE ACETATE 5 MICROGRAM(S)/HR: 200 INJECTION, SOLUTION INTRAVENOUS; SUBCUTANEOUS at 17:29

## 2019-06-17 RX ADMIN — Medication 10 MILLIGRAM(S): at 21:34

## 2019-06-17 RX ADMIN — SENNA PLUS 2 TABLET(S): 8.6 TABLET ORAL at 21:34

## 2019-06-17 RX ADMIN — Medication 25 MILLIGRAM(S): at 06:01

## 2019-06-17 RX ADMIN — APIXABAN 10 MILLIGRAM(S): 2.5 TABLET, FILM COATED ORAL at 06:02

## 2019-06-17 RX ADMIN — OCTREOTIDE ACETATE 5 MICROGRAM(S)/HR: 200 INJECTION, SOLUTION INTRAVENOUS; SUBCUTANEOUS at 06:10

## 2019-06-17 RX ADMIN — LOSARTAN POTASSIUM 100 MILLIGRAM(S): 100 TABLET, FILM COATED ORAL at 06:01

## 2019-06-17 RX ADMIN — Medication 1000 MILLIGRAM(S): at 07:56

## 2019-06-17 RX ADMIN — APIXABAN 10 MILLIGRAM(S): 2.5 TABLET, FILM COATED ORAL at 17:19

## 2019-06-17 RX ADMIN — Medication 10 MILLIGRAM(S): at 13:34

## 2019-06-17 RX ADMIN — Medication 400 MILLIGRAM(S): at 07:55

## 2019-06-17 NOTE — PROGRESS NOTE ADULT - ASSESSMENT
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  CTPA of chest also revealed pulmonary emboli.   6/17 The pt is able to eat small amounts of food and had a BM conservative approach still the plan.

## 2019-06-17 NOTE — PROGRESS NOTE ADULT - SUBJECTIVE AND OBJECTIVE BOX
SURGERY DAILY PROGRESS NOTE:       SUBJECTIVE/ROS: Patient examined at bedside. No acute events overnight. Tolerates diet w/o N/V. Minimal flatus. Denies pain.          MEDICATIONS  (STANDING):  apixaban 10 milliGRAM(s) Oral every 12 hours  hydrochlorothiazide 25 milliGRAM(s) Oral daily  losartan 100 milliGRAM(s) Oral daily  metoclopramide Injectable 10 milliGRAM(s) IV Push every 8 hours  octreotide  Infusion 25 MICROgram(s)/Hr (5 mL/Hr) IV Continuous <Continuous>  senna 2 Tablet(s) Oral at bedtime    MEDICATIONS  (PRN):  acetaminophen   Tablet .. 650 milliGRAM(s) Oral every 6 hours PRN Mild Pain (1 - 3)      OBJECTIVE:    Vital Signs Last 24 Hrs  T(C): 36.7 (17 Jun 2019 05:58), Max: 36.9 (17 Jun 2019 01:01)  T(F): 98 (17 Jun 2019 05:58), Max: 98.5 (17 Jun 2019 01:01)  HR: 83 (17 Jun 2019 05:58) (56 - 83)  BP: 141/90 (17 Jun 2019 05:58) (141/90 - 155/63)  BP(mean): --  RR: 18 (17 Jun 2019 05:58) (18 - 18)  SpO2: 97% (17 Jun 2019 05:58) (96% - 98%)        I&O's Detail    16 Jun 2019 07:01  -  17 Jun 2019 07:00  --------------------------------------------------------  IN:    Oral Fluid: 720 mL  Total IN: 720 mL    OUT:    Voided: 1900 mL  Total OUT: 1900 mL    Total NET: -1180 mL          Daily     Daily     LABS:                        13.0   7.46  )-----------( 386      ( 15 Arvin 2019 18:03 )             39.1     06-15    137  |  97  |  <4<L>  ----------------------------<  199<H>  4.0   |  25  |  0.48<L>    Ca    9.9      15 Arvin 2019 12:22  Phos  2.8     06-15  Mg     2.0     06-15      PTT - ( 15 Arvin 2019 12:22 )  PTT:28.4 sec            Physical Exam:   General: NAD  Abdomen: soft, mildly distended, not tender, RUQ well healed incision

## 2019-06-17 NOTE — PROGRESS NOTE ADULT - SUBJECTIVE AND OBJECTIVE BOX
She remains on octreotide and IV reglan; dexamethasone added 6/17/19  Minimal flatus overnight  2 BMs recorded today so far  Denies CP or SOB      Vital Signs Last 24 Hrs  T(C): 37 (17 Jun 2019 13:22), Max: 37 (17 Jun 2019 13:22)  T(F): 98.6 (17 Jun 2019 13:22), Max: 98.6 (17 Jun 2019 13:22)  HR: 82 (17 Jun 2019 13:22) (56 - 83)  BP: 137/85 (17 Jun 2019 13:22) (137/85 - 155/63)  BP(mean): --  RR: 18 (17 Jun 2019 13:22) (18 - 18)  SpO2: 97% (17 Jun 2019 13:22) (95% - 98%)    GENERAL: NAD  HEAD:  Atraumatic, Normocephalic  EYES: EOMI, PERRLA, conjunctiva and sclera clear  MOUTH/THROAT: no swelling, no erythema, no lesions, no exudates  NECK: Supple, No JVD, No LAD, No carotid bruits  CHEST/LUNG: Clear to auscultation bilaterally; No wheezes or rales  HEART: Regular rate and rhythm; nl S1/S2; No murmurs, rubs, or gallops  ABDOMEN: Soft, Nontender, Nondistended; Hypoactive BS  EXTREMITIES:  2+ Peripheral Pulses; No clubbing, cyanosis, or edema b/l  SKIN: No rashes or lesions; No jaundice  NEURO: A+O x 3; nonfocal CN/motor/sensory/reflexes  PSYCH: Nl affect; no delirium or agitation; no suicidal/homicidal ideation      LABS:                        13.0   7.46  )-----------( 386      ( 15 Arvin 2019 18:03 )             39.1             CAPILLARY BLOOD GLUCOSE

## 2019-06-17 NOTE — PROGRESS NOTE ADULT - SUBJECTIVE AND OBJECTIVE BOX
[facility] Hematology/Oncology - Ml Zhu / Joshua / Braulio - [phone]  Primary Outpatient Hematologist/Oncologist:     Chief Complaint:  Patient is a 64y old  Female who presents with a chief complaint of nausea vomiting obstipation (14 Jun 2019 04:48)      HPI:  64F w/ PMH of cerebral aneurysm, s/p  shunt, HTN, HLD, s/p lap cholecystectomy, path positive for GB adenocarcinoma s/p liver rxn w/ mets to the abd, currently on chemo, now p/w 3 days of n/v, inability to intake PO, no flatus or BM, and abd pain and distension. She vomited once in the AM in ED, otherwise  her nausea improved since admission to ED. Otherwise, Pt denies f/c, cp/SOB, dizziness.     CT abd/Pelvis obtained in ED showing mild SBO w/ TP in the terminal ileum. CT chest showing small b/l PE. Pt refused NGT insertion. Of note, pt's oncologist is Dr. 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   He was treated with adjuvant Ge from 04/17/2/07/18.  CT of the abdomen and pelvis  in 12/18 revealed 6.5 cm lesion in the lower pelvis and right hepatic lobe implant and a toenail nodularity and biopsy In 01/19was positive for metastatic diseaseSocial was started on Gemzar and oxaliplatin however,PET/CT scan on 04/02/19 revealed progressive disease and significant increase in the size of the cystic complement of the large pelvic mass noted on the previous scans also.  She was started on Xeloda on 05/8/19.  She was Scheduled for a follow-up PET/CT scan on 06/17/19. 6/15 pt feeling better and ate this am but still bloated and this week off xeloda this week and counts are ok     6/16 conservative non surgical approach for now and pt walking in the halls. 6/17 The pt is able to eat small amounts of food and had a BM conservative approach still the plan.    ROS:  General:  (-)Pain, (-)Decrease appeite, (-)Fevers, (-)Chills, (-)Weight Loss  Eyes: (-)Blurry Vision, (-)Double Vision, (-)Vision Loss  ENT: (-)Sinus Congestion, (-)Decreased Hearing, (-)Nosebleeds, (-)Sore Throat  Cardiac: (-)Chest Pain, (-)Palpitations, (-)Shortness of breathe on exertion  Respiratory: (-)Cough, (-)hemoptysis, (-)Shortness of breathe  GI: (+)Nausea, (+)Vomiting, (-)Diarrhea, (-)Constipation, (-)Melena, (-)BRBPR  : (-)Hematuria, (-)Dysuria, (-)Polyuia  MSK: (-)Back pain, (-)Joint pain, (-)Stiffness    Neurology:  (-)Numbness, (-)Tingling, (-)Difficulty Walking, (-)Tremors, (-)Weakness  Psych: (-)Anxiety, (-)Depression, (-)Memory Loss  Hematology:  (-)Easy Bruising, (-)Easy Bleeding, (-)Night Sweats.     PAST MEDICAL & SURGICAL HISTORY:  Malignant neoplasm of gallbladder  History of osteoarthritis  Gallstones: dx: &#x27; 2015  Hypercholesterolemia: Borderline. Diet-managed  Multiparity  Hypertension  Vitamin D deficiency  Cerebral aneurysm: &#x27; 2009:  surgically treated  History of cholecystectomy: 12/16  S/P ventricular shunt placement: &#x27; 2009  S/P cerebral aneurysm operation: &#x27; 2009:  Clipping of Cerebral Aneurysm      Social History  Tobacco: Denies current use  Alcohol: Denies current use  Drugs:  Denies current use    FAMILY HISTORY:  CAD (coronary artery disease): Father  MEDICATIONS  (STANDING):  apixaban 10 milliGRAM(s) Oral every 12 hours  dexamethasone  Injectable 10 milliGRAM(s) IV Push every 8 hours  hydrochlorothiazide 25 milliGRAM(s) Oral daily  losartan 100 milliGRAM(s) Oral daily  metoclopramide Injectable 10 milliGRAM(s) IV Push every 8 hours  octreotide  Infusion 25 MICROgram(s)/Hr (5 mL/Hr) IV Continuous <Continuous>  senna 2 Tablet(s) Oral at bedtime      Allergies    No Known Allergies    Intolerances        Physical Exam:  General: AAO x 3. NAD. Lying in bed comfortably.  HEENT: clear oropharynx, anicteric sclera, pink conjunctivae, EOMi. PERRLA  Cardiac: S1, S2 present. No audible murmurs. RRR  Lungs: CTA B/L.   Abdomen: Soft, mild tenderness, Distended. hyperactive BS but bloated still  Extremities: 2+ pulses. No edema  Skin: No Rashes. No petechiae  Neuro: No focal motor or sensory deficits.     Labs:  CBC Full  -  ( 14 Jun 2019 13:22 )  WBC Count : 5.8 K/uL  RBC Count : 3.76 M/uL  Hemoglobin : 12.7 g/dL  Hematocrit : 36.7 %  Platelet Count - Automated : 324 K/uL  Mean Cell Volume : 97.7 fl  Mean Cell Hemoglobin : 33.9 pg  Mean Cell Hemoglobin Concentration : 34.6 gm/dL  Auto Neutrophil # : x  Auto Lymphocyte # : x  Auto Monocyte # : x  Auto Eosinophil # : x  Auto Basophil # : x  Auto Neutrophil % : x  Auto Lymphocyte % : x  Auto Monocyte % : x  Auto Eosinophil % : x  Auto Basophil % : x    06-14    141  |  102  |  5<L>  ----------------------------<  128<H>  3.1<L>   |  28  |  0.56    Ca    9.2      14 Jun 2019 06:00    TPro  7.1  /  Alb  4.0  /  TBili  0.4  /  DBili  x   /  AST  12  /  ALT  5<L>  /  AlkPhos  80  06-13    PT/INR - ( 13 Jun 2019 19:32 )   PT: 13.0 sec;   INR: 1.14 ratio         PTT - ( 14 Jun 2019 13:22 )  PTT:105.5 sec    < from: CT Angio Chest w/ IV Cont (06.13.19 @ 18:45) >  CT Angiography of the Chest.  90 ml of Omnipaque 350 was injected intravenously. 10 ml were discarded.  Sagittal and coronal reformats were performed as well as 3D (MIP)   reconstructions.      FINDINGS:    LUNGS AND AIRWAYS: Patent central airways.  Mild compressive subsegmental   atelectasis in the left lower lobe.    PLEURA: Small left pleural effusion. No pneumothorax.    MEDIASTINUM AND COLTON: No lymphadenopathy.    VESSELS: Acute pulmonary emboli within the right lower lobar artery   extending into right lower lobe segmental pulmonary arteries. Additional   acute pulmonary embolus in a left upper lobe segmental artery.    HEART: Heart size is normal. No CT evidence of right heart strain. No   pericardial effusion.    CHEST WALL AND LOWER NECK: Left sided Mediport.    VISUALIZED UPPER ABDOMEN: Partially visualized ascites, peritoneal   carcinomatosis and right hydronephrosis.     BONES: Degenerative changes.    IMPRESSION:    Acute bilateral pulmonary emboli as above. No evidence of right heart   strain.    < end of copied text >  CT Abdomen and Pelvis w/ IV Cont (06.13.19 @ 16:12) >  Mildly dilated and fecalized loops of small bowel with a transition point   at the distal ileum consistent with a small bowel obstruction.    Large pelvic mass with solid and multicystic components causing mild   right hydroureteronephrosis.    Omental caking consistent with metastatic disease.    Moderate ascites.    Filling defects suggested within the right lower lobe segmental pulmonary   arteries. CT angiogram of the chest is recommended to further evaluate   for pulmonary emboli.    < end of copied text >

## 2019-06-17 NOTE — PROGRESS NOTE ADULT - ASSESSMENT
64F w/ PMH of T2Nx GB adenocarcinoma s/p rxn, now w/ mets to the abd, now p/w malignant SBO. Pt chosen non -op trt w/ malignant SBO protocol. SBO resolving.       - C/w Regular diet  - C/w  Eliquis (copay $20) for newly diagnosed PE  - C/w malignant SBO protocol  - D/c planning if continues to tolerate diet and GI fxn present     [facility] team surgery  [id number]

## 2019-06-17 NOTE — PROGRESS NOTE ADULT - ASSESSMENT
64-yo female w/PMHx of gallbladder cancer on chemo, HTN, intracranial aneurysm, Vit D deficiency and HLD, admitted with SBO and b/l PE.  No CT evidence for rt heart strain.    Problem/Recommendation - 1:  Problem: SBO (small bowel obstruction). Recommendation: Malignant SBO with transition point distal ileum  Appreciate surgical care  On octreotide gtt/reglan IV/dexamethasone  Diet has been resumed  Monitor abd exam serially  Monitor for flatus, BMs.      Problem/Recommendation - 2:  ·  Problem: Pulmonary embolism.  Recommendation: RLL and LLL segmental PE without evidence of rt heart strain on the CTA  LE venous dopplers (-)  TTE (not done yet)   Malignancy is primary risk factor  On eliquis.    Problem/Recommendation - 3:  ·  Problem: Malignant neoplasm of gallbladder.  Recommendation: Appreciate heme/onc  Advanced stage  Will need to f/u with her outside oncologist prior to resuming chemotherapy.     Problem/Recommendation - 4:  ·  Problem: Hypercholesterolemia.  Recommendation: Not currently on any statins.     Problem/Recommendation - 5:  ·  Problem: Hypertension.  Recommendation: Continue ARB and HCTZ.    Problem/Recommendation - 6:  Problem: Cerebral aneurysm. Recommendation: Not an active inpt issue.    Problem/Recommendation - 7:  Problem: Vitamin D deficiency. Recommendation: She is on Vit D supplementation at home.    Problem/Recommendation - 8:  Problem: Need for prophylactic measure. Recommendation: On eliquis.        Tr Etienne M.D.  [facility]   [phone]

## 2019-06-18 ENCOUNTER — TRANSCRIPTION ENCOUNTER (OUTPATIENT)
Age: 64
End: 2019-06-18

## 2019-06-18 VITALS
TEMPERATURE: 98 F | HEART RATE: 75 BPM | SYSTOLIC BLOOD PRESSURE: 150 MMHG | DIASTOLIC BLOOD PRESSURE: 97 MMHG | RESPIRATION RATE: 18 BRPM | OXYGEN SATURATION: 97 %

## 2019-06-18 PROCEDURE — 99238 HOSP IP/OBS DSCHRG MGMT 30/<: CPT

## 2019-06-18 RX ORDER — APIXABAN 2.5 MG/1
2 TABLET, FILM COATED ORAL
Qty: 0 | Refills: 0 | DISCHARGE
Start: 2019-06-18

## 2019-06-18 RX ORDER — APIXABAN 2.5 MG/1
2 TABLET, FILM COATED ORAL
Qty: 40 | Refills: 0
Start: 2019-06-18 | End: 2019-06-27

## 2019-06-18 RX ORDER — SENNA PLUS 8.6 MG/1
2 TABLET ORAL
Qty: 0 | Refills: 0 | DISCHARGE
Start: 2019-06-18

## 2019-06-18 RX ADMIN — Medication 10 MILLIGRAM(S): at 14:33

## 2019-06-18 RX ADMIN — LOSARTAN POTASSIUM 100 MILLIGRAM(S): 100 TABLET, FILM COATED ORAL at 06:12

## 2019-06-18 RX ADMIN — Medication 650 MILLIGRAM(S): at 17:30

## 2019-06-18 RX ADMIN — Medication 650 MILLIGRAM(S): at 00:00

## 2019-06-18 RX ADMIN — APIXABAN 10 MILLIGRAM(S): 2.5 TABLET, FILM COATED ORAL at 06:12

## 2019-06-18 RX ADMIN — Medication 10 MILLIGRAM(S): at 06:12

## 2019-06-18 RX ADMIN — APIXABAN 10 MILLIGRAM(S): 2.5 TABLET, FILM COATED ORAL at 17:29

## 2019-06-18 RX ADMIN — Medication 25 MILLIGRAM(S): at 06:12

## 2019-06-18 NOTE — PROGRESS NOTE ADULT - REASON FOR ADMISSION
nausea vomiting obstipation

## 2019-06-18 NOTE — PROGRESS NOTE ADULT - ASSESSMENT
64F w/ PMH of T2Nx GB adenocarcinoma s/p rxn, now w/ mets to the abd, now p/w malignant SBO. Pt chosen non -op trt w/ malignant SBO protocol. SBO resolved.       - C/w Regular diet  - C/w  Eliquis (copay $20) for newly diagnosed PE  - F/u with heme onc on discharge   - D/c planning if continues to tolerate diet and GI fxn present     [facility] team surgery  [id number] 64F w/ PMH of T2Nx GB adenocarcinoma s/p rxn, now w/ mets to the abd, now p/w malignant SBO. Pt chosen non -op trt w/ malignant SBO protocol. SBO resolved.       - C/w Regular diet  - C/w  Eliquis (copay $20) for newly diagnosed PE  - Will discuss continuation of malignant SBO protocol   - F/u with heme onc on discharge   - D/c planning if continues to tolerate diet and GI fxn present     [facility] team surgery  [id number]

## 2019-06-18 NOTE — DISCHARGE NOTE NURSING/CASE MANAGEMENT/SOCIAL WORK - NSDCDPATPORTLINK_GEN_ALL_CORE
You can access the [email or web address] Patient Portal, offered by [facility], by registering with the following website: [email or web address]

## 2019-06-18 NOTE — PROGRESS NOTE ADULT - SUBJECTIVE AND OBJECTIVE BOX
[facility] Hematology/Oncology - Ml Zhu / Joshua / Braulio - [phone]  Primary Outpatient Hematologist/Oncologist:     Chief Complaint:  Patient is a 64y old  Female who presents with a chief complaint of nausea vomiting obstipation (14 Jun 2019 04:48)      HPI:  64F w/ PMH of cerebral aneurysm, s/p  shunt, HTN, HLD, s/p lap cholecystectomy, path positive for GB adenocarcinoma s/p liver rxn w/ mets to the abd, currently on chemo, now p/w 3 days of n/v, inability to intake PO, no flatus or BM, and abd pain and distension. She vomited once in the AM in ED, otherwise  her nausea improved since admission to ED. Otherwise, Pt denies f/c, cp/SOB, dizziness.     CT abd/Pelvis obtained in ED showing mild SBO w/ TP in the terminal ileum. CT chest showing small b/l PE. Pt refused NGT insertion. Of note, pt's oncologist is Dr. 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   He was treated with adjuvant Ge from 04/17/2/07/18.  CT of the abdomen and pelvis  in 12/18 revealed 6.5 cm lesion in the lower pelvis and right hepatic lobe implant and a toenail nodularity and biopsy In 01/19was positive for metastatic diseaseSocial was started on Gemzar and oxaliplatin however,PET/CT scan on 04/02/19 revealed progressive disease and significant increase in the size of the cystic complement of the large pelvic mass noted on the previous scans also.  She was started on Xeloda on 05/8/19.  She was Scheduled for a follow-up PET/CT scan on 06/17/19. 6/15 pt feeling better and ate this am but still bloated and this week off xeloda this week and counts are ok     6/16 conservative non surgical approach for now and pt walking in the halls. 6/17 The pt is able to eat small amounts of food and had a BM conservative approach still the plan. 6/18 pt feels better and able to eat small amounts of food and will be discharged and will follow up with Dr Zhu.    ROS:  General:  (-)Pain, (-)Decrease appeite, (-)Fevers, (-)Chills, (-)Weight Loss  Eyes: (-)Blurry Vision, (-)Double Vision, (-)Vision Loss  ENT: (-)Sinus Congestion, (-)Decreased Hearing, (-)Nosebleeds, (-)Sore Throat  Cardiac: (-)Chest Pain, (-)Palpitations, (-)Shortness of breathe on exertion  Respiratory: (-)Cough, (-)hemoptysis, (-)Shortness of breathe  GI: (+)Nausea, (+)Vomiting, (-)Diarrhea, (-)Constipation, (-)Melena, (-)BRBPR  : (-)Hematuria, (-)Dysuria, (-)Polyuia  MSK: (-)Back pain, (-)Joint pain, (-)Stiffness    Neurology:  (-)Numbness, (-)Tingling, (-)Difficulty Walking, (-)Tremors, (-)Weakness  Psych: (-)Anxiety, (-)Depression, (-)Memory Loss  Hematology:  (-)Easy Bruising, (-)Easy Bleeding, (-)Night Sweats.     PAST MEDICAL & SURGICAL HISTORY:  Malignant neoplasm of gallbladder  History of osteoarthritis  Gallstones: dx: &#x27; 2015  Hypercholesterolemia: Borderline. Diet-managed  Multiparity  Hypertension  Vitamin D deficiency  Cerebral aneurysm: &#x27; 2009:  surgically treated  History of cholecystectomy: 12/16  S/P ventricular shunt placement: &#x27; 2009  S/P cerebral aneurysm operation: &#x27; 2009:  Clipping of Cerebral Aneurysm      Social History  Tobacco: Denies current use  Alcohol: Denies current use  Drugs:  Denies current use    FAMILY HISTORY:  CAD (coronary artery disease): Father  MEDICATIONS  (STANDING):  apixaban 10 milliGRAM(s) Oral every 12 hours  dexamethasone  Injectable 10 milliGRAM(s) IV Push every 8 hours  hydrochlorothiazide 25 milliGRAM(s) Oral daily  losartan 100 milliGRAM(s) Oral daily  metoclopramide Injectable 10 milliGRAM(s) IV Push every 8 hours  octreotide  Infusion 25 MICROgram(s)/Hr (5 mL/Hr) IV Continuous <Continuous>  senna 2 Tablet(s) Oral at bedtime      Allergies    No Known Allergies    Intolerances        Physical Exam:  General: AAO x 3. NAD. Lying in bed comfortably.  HEENT: clear oropharynx, anicteric sclera, pink conjunctivae, EOMi. PERRLA  Cardiac: S1, S2 present. No audible murmurs. RRR  Lungs: CTA B/L.   Abdomen: Soft, mild tenderness, Distended. hyperactive BS but bloated still  Extremities: 2+ pulses. No edema  Skin: No Rashes. No petechiae  Neuro: No focal motor or sensory deficits.     Labs:  CBC Full  -  ( 14 Jun 2019 13:22 )  WBC Count : 5.8 K/uL  RBC Count : 3.76 M/uL  Hemoglobin : 12.7 g/dL  Hematocrit : 36.7 %  Platelet Count - Automated : 324 K/uL  Mean Cell Volume : 97.7 fl  Mean Cell Hemoglobin : 33.9 pg  Mean Cell Hemoglobin Concentration : 34.6 gm/dL  Auto Neutrophil # : x  Auto Lymphocyte # : x  Auto Monocyte # : x  Auto Eosinophil # : x  Auto Basophil # : x  Auto Neutrophil % : x  Auto Lymphocyte % : x  Auto Monocyte % : x  Auto Eosinophil % : x  Auto Basophil % : x    06-14    141  |  102  |  5<L>  ----------------------------<  128<H>  3.1<L>   |  28  |  0.56    Ca    9.2      14 Jun 2019 06:00    TPro  7.1  /  Alb  4.0  /  TBili  0.4  /  DBili  x   /  AST  12  /  ALT  5<L>  /  AlkPhos  80  06-13    PT/INR - ( 13 Jun 2019 19:32 )   PT: 13.0 sec;   INR: 1.14 ratio         PTT - ( 14 Jun 2019 13:22 )  PTT:105.5 sec    < from: CT Angio Chest w/ IV Cont (06.13.19 @ 18:45) >  CT Angiography of the Chest.  90 ml of Omnipaque 350 was injected intravenously. 10 ml were discarded.  Sagittal and coronal reformats were performed as well as 3D (MIP)   reconstructions.      FINDINGS:    LUNGS AND AIRWAYS: Patent central airways.  Mild compressive subsegmental   atelectasis in the left lower lobe.    PLEURA: Small left pleural effusion. No pneumothorax.    MEDIASTINUM AND COLTON: No lymphadenopathy.    VESSELS: Acute pulmonary emboli within the right lower lobar artery   extending into right lower lobe segmental pulmonary arteries. Additional   acute pulmonary embolus in a left upper lobe segmental artery.    HEART: Heart size is normal. No CT evidence of right heart strain. No   pericardial effusion.    CHEST WALL AND LOWER NECK: Left sided Mediport.    VISUALIZED UPPER ABDOMEN: Partially visualized ascites, peritoneal   carcinomatosis and right hydronephrosis.     BONES: Degenerative changes.    IMPRESSION:    Acute bilateral pulmonary emboli as above. No evidence of right heart   strain.    < end of copied text >  CT Abdomen and Pelvis w/ IV Cont (06.13.19 @ 16:12) >  Mildly dilated and fecalized loops of small bowel with a transition point   at the distal ileum consistent with a small bowel obstruction.    Large pelvic mass with solid and multicystic components causing mild   right hydroureteronephrosis.    Omental caking consistent with metastatic disease.    Moderate ascites.    Filling defects suggested within the right lower lobe segmental pulmonary   arteries. CT angiogram of the chest is recommended to further evaluate   for pulmonary emboli.    < end of copied text >

## 2019-06-18 NOTE — DISCHARGE NOTE PROVIDER - NSDCACTIVITY_GEN_ALL_CORE
Walking - Indoors allowed/Walking - Outdoors allowed No restrictions/Walking - Outdoors allowed/Walking - Indoors allowed

## 2019-06-18 NOTE — DISCHARGE NOTE PROVIDER - NSDCFUADDAPPT_GEN_ALL_CORE_FT
Please follow up with your primary care physician Dr. Barton in 1-2 weeks Please follow up with Dr. Bhatia in 1-2 weeks of discharge.

## 2019-06-18 NOTE — PROGRESS NOTE ADULT - ATTENDING COMMENTS
Elizabeth Lewis MD  [place] [facility]  5 6/15 pt feeling better and ate this am but still bloated and this week off xeloda this week and counts are ok
Elizabeth Lewis MD  [place] [facility]  5 6/15 pt feeling better and ate this am but still bloated and this week off xeloda this week and counts are ok
Elizabeth Lewis MD  [place] [facility]  5 6/15 pt feeling better and ate this am but still bloated and this week off xeloda this week and counts are ok
Elizabeth Lewis MD  [place] [facility]  5 6/15 pt feeling better and ate this am but still bloated and this week off xeloda this week and counts are ok

## 2019-06-18 NOTE — PROGRESS NOTE ADULT - SUBJECTIVE AND OBJECTIVE BOX
SURGERY DAILY PROGRESS NOTE:       SUBJECTIVE/ROS: Patient examined at bedside. No acute events overnight. Tolerates diet w/o N/V. +BM/+Flatus. Wants to go home.          MEDICATIONS  (STANDING):  apixaban 10 milliGRAM(s) Oral every 12 hours  hydrochlorothiazide 25 milliGRAM(s) Oral daily  losartan 100 milliGRAM(s) Oral daily  metoclopramide Injectable 10 milliGRAM(s) IV Push every 8 hours  octreotide  Infusion 25 MICROgram(s)/Hr (5 mL/Hr) IV Continuous <Continuous>  senna 2 Tablet(s) Oral at bedtime    MEDICATIONS  (PRN):  acetaminophen   Tablet .. 650 milliGRAM(s) Oral every 6 hours PRN Mild Pain (1 - 3)      OBJECTIVE:    Vital Signs Last 24 Hrs  T(C): 37.2 (18 Jun 2019 05:39), Max: 37.2 (18 Jun 2019 05:39)  T(F): 98.9 (18 Jun 2019 05:39), Max: 98.9 (18 Jun 2019 05:39)  HR: 87 (18 Jun 2019 05:39) (70 - 87)  BP: 147/89 (18 Jun 2019 05:39) (135/84 - 151/96)  BP(mean): --  RR: 18 (18 Jun 2019 05:39) (18 - 18)  SpO2: 98% (18 Jun 2019 05:39) (95% - 98%)        I&O's Detail    17 Jun 2019 07:01  -  18 Jun 2019 07:00  --------------------------------------------------------  IN:    octreotide  Infusion: 60 mL    Oral Fluid: 750 mL  Total IN: 810 mL    OUT:  Total OUT: 0 mL    Total NET: 810 mL          Daily     Daily     LABS:                        PHYSICAL EXAM:  GEN: NAD  Pulm: unlabored breathing on RA  CV: radial pulse 2+, cap refil <2sec  Abd: soft, nontender, nondistedned, well healed incision SURGERY DAILY PROGRESS NOTE:       SUBJECTIVE/ROS: Patient examined at bedside. No acute events overnight. Tolerates diet w/o N/V. +BM/+Flatus. Wants to go home.          MEDICATIONS  (STANDING):  apixaban 10 milliGRAM(s) Oral every 12 hours  hydrochlorothiazide 25 milliGRAM(s) Oral daily  losartan 100 milliGRAM(s) Oral daily  metoclopramide Injectable 10 milliGRAM(s) IV Push every 8 hours  octreotide  Infusion 25 MICROgram(s)/Hr (5 mL/Hr) IV Continuous <Continuous>  senna 2 Tablet(s) Oral at bedtime    MEDICATIONS  (PRN):  acetaminophen   Tablet .. 650 milliGRAM(s) Oral every 6 hours PRN Mild Pain (1 - 3)      OBJECTIVE:    Vital Signs Last 24 Hrs  T(C): 37.2 (18 Jun 2019 05:39), Max: 37.2 (18 Jun 2019 05:39)  T(F): 98.9 (18 Jun 2019 05:39), Max: 98.9 (18 Jun 2019 05:39)  HR: 87 (18 Jun 2019 05:39) (70 - 87)  BP: 147/89 (18 Jun 2019 05:39) (135/84 - 151/96)  BP(mean): --  RR: 18 (18 Jun 2019 05:39) (18 - 18)  SpO2: 98% (18 Jun 2019 05:39) (95% - 98%)        I&O's Detail    17 Jun 2019 07:01  -  18 Jun 2019 07:00  --------------------------------------------------------  IN:    octreotide  Infusion: 60 mL    Oral Fluid: 750 mL  Total IN: 810 mL    OUT:  Total OUT: 0 mL    Total NET: 810 mL          Daily     Daily     LABS:                        PHYSICAL EXAM:  GEN: NAD  Pulm: unlabored breathing on RA  CV: radial pulse 2+, cap refil <2sec  Abd: soft, nontender, mildly distended well healed incision

## 2019-06-18 NOTE — DISCHARGE NOTE PROVIDER - NSDCCPCAREPLAN_GEN_ALL_CORE_FT
PRINCIPAL DISCHARGE DIAGNOSIS  Diagnosis: SBO (small bowel obstruction)  Assessment and Plan of Treatment: now resolved- please follow up with Dr. Aguirre and Dr. Crews (oncology) as outpatient      SECONDARY DISCHARGE DIAGNOSES  Diagnosis: Hypokalemia  Assessment and Plan of Treatment: now resolved- secondary to bowel obstruction    Diagnosis: Pulmonary embolus  Assessment and Plan of Treatment: continue eliquis as prescribed- follow up with hematology/oncology PRINCIPAL DISCHARGE DIAGNOSIS  Diagnosis: SBO (small bowel obstruction)  Assessment and Plan of Treatment: now resolved- please follow up with Dr. Crews (oncology) as outpatient      SECONDARY DISCHARGE DIAGNOSES  Diagnosis: Hypokalemia  Assessment and Plan of Treatment: now resolved- secondary to bowel obstruction    Diagnosis: Pulmonary embolus  Assessment and Plan of Treatment: continue eliquis as prescribed- follow up with hematology/oncology

## 2019-06-18 NOTE — DISCHARGE NOTE PROVIDER - PROVIDER TOKENS
PROVIDER:[TOKEN:[[id number]],FOLLOWUP:[2 weeks]],PROVIDER:[TOKEN:[[id number]],FOLLOWUP:[1 week]] PROVIDER:[TOKEN:[[id number]],FOLLOWUP:[1 week]]

## 2019-06-18 NOTE — DISCHARGE NOTE PROVIDER - CARE PROVIDERS DIRECT ADDRESSES
[email or web address]

## 2019-06-18 NOTE — DISCHARGE NOTE PROVIDER - NSDCFUADDINST_GEN_ALL_CORE_FT
Please continue 10mg of Eliquis twice a day till 6/22 then only take 5mg twice a day- Please follow up with your oncologist as outpatient  NOTIFY YOUR SURGEON IF:  Any fever (over 100.4 F) or chills, persistent nausea/vomiting with inability to tolerate food or liquids, persistent diarrhea, or if your abdominal pain returns or worsens.

## 2019-06-18 NOTE — PROGRESS NOTE ADULT - PROVIDER SPECIALTY LIST ADULT
Heme/Onc
Internal Medicine
Internal Medicine
Surgery
Heme/Onc

## 2019-06-18 NOTE — DISCHARGE NOTE PROVIDER - CARE PROVIDER_API CALL
Del Aguirre)  Surgery; Surgical Oncology  [address] [place] [address]  Phone: [phone]  Fax: [phone]  Follow Up Time: 2 weeks    Edgar Wise)  Hematology; Internal Medicine; Medical Oncology  [address], [address]  [place] [address]  Phone: [phone]  Fax: [phone]  Follow Up Time: 1 week Edgar Wise (MD)  Hematology; Internal Medicine; Medical Oncology  [address], [address]  [place] [address]  Phone: [phone]  Fax: [phone]  Follow Up Time: 1 week

## 2019-06-18 NOTE — PROGRESS NOTE ADULT - PROBLEM SELECTOR PROBLEM 1
Malignant neoplasm of gallbladder

## 2019-06-18 NOTE — PROGRESS NOTE ADULT - ASSESSMENT
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  CTPA of chest also revealed pulmonary emboli.   6/17 The pt is able to eat small amounts of food and had a BM conservative approach still the plan.   6/18 pt feels better and able to eat small amounts of food and will be discharged and will follow up with Dr Zhu.

## 2019-06-19 PROCEDURE — 82803 BLOOD GASES ANY COMBINATION: CPT

## 2019-06-19 PROCEDURE — 85014 HEMATOCRIT: CPT

## 2019-06-19 PROCEDURE — 84132 ASSAY OF SERUM POTASSIUM: CPT

## 2019-06-19 PROCEDURE — 82435 ASSAY OF BLOOD CHLORIDE: CPT

## 2019-06-19 PROCEDURE — 74177 CT ABD & PELVIS W/CONTRAST: CPT

## 2019-06-19 PROCEDURE — 82962 GLUCOSE BLOOD TEST: CPT

## 2019-06-19 PROCEDURE — 86850 RBC ANTIBODY SCREEN: CPT

## 2019-06-19 PROCEDURE — 96374 THER/PROPH/DIAG INJ IV PUSH: CPT | Mod: XU

## 2019-06-19 PROCEDURE — 86803 HEPATITIS C AB TEST: CPT

## 2019-06-19 PROCEDURE — 87086 URINE CULTURE/COLONY COUNT: CPT

## 2019-06-19 PROCEDURE — 81001 URINALYSIS AUTO W/SCOPE: CPT

## 2019-06-19 PROCEDURE — 99285 EMERGENCY DEPT VISIT HI MDM: CPT | Mod: 25

## 2019-06-19 PROCEDURE — 85610 PROTHROMBIN TIME: CPT

## 2019-06-19 PROCEDURE — 93970 EXTREMITY STUDY: CPT

## 2019-06-19 PROCEDURE — 71275 CT ANGIOGRAPHY CHEST: CPT

## 2019-06-19 PROCEDURE — 85027 COMPLETE CBC AUTOMATED: CPT

## 2019-06-19 PROCEDURE — 82330 ASSAY OF CALCIUM: CPT

## 2019-06-19 PROCEDURE — 86901 BLOOD TYPING SEROLOGIC RH(D): CPT

## 2019-06-19 PROCEDURE — 80048 BASIC METABOLIC PNL TOTAL CA: CPT

## 2019-06-19 PROCEDURE — 96375 TX/PRO/DX INJ NEW DRUG ADDON: CPT | Mod: XU

## 2019-06-19 PROCEDURE — 82947 ASSAY GLUCOSE BLOOD QUANT: CPT

## 2019-06-19 PROCEDURE — 84100 ASSAY OF PHOSPHORUS: CPT

## 2019-06-19 PROCEDURE — 86900 BLOOD TYPING SEROLOGIC ABO: CPT

## 2019-06-19 PROCEDURE — 83605 ASSAY OF LACTIC ACID: CPT

## 2019-06-19 PROCEDURE — 83690 ASSAY OF LIPASE: CPT

## 2019-06-19 PROCEDURE — 84295 ASSAY OF SERUM SODIUM: CPT

## 2019-06-19 PROCEDURE — 85730 THROMBOPLASTIN TIME PARTIAL: CPT

## 2019-06-19 PROCEDURE — 83735 ASSAY OF MAGNESIUM: CPT

## 2019-06-19 PROCEDURE — 80053 COMPREHEN METABOLIC PANEL: CPT

## 2019-06-24 DIAGNOSIS — M54.9 DORSALGIA, UNSPECIFIED: ICD-10-CM

## 2019-07-18 ENCOUNTER — INPATIENT (INPATIENT)
Facility: HOSPITAL | Age: 64
LOS: 13 days | Discharge: HOME | DRG: 374 | End: 2019-08-01
Attending: HOSPITALIST | Admitting: HOSPITALIST
Payer: MEDICARE

## 2019-07-18 VITALS
WEIGHT: 175.05 LBS | HEIGHT: 63 IN | TEMPERATURE: 98 F | SYSTOLIC BLOOD PRESSURE: 126 MMHG | HEART RATE: 97 BPM | RESPIRATION RATE: 18 BRPM | OXYGEN SATURATION: 98 % | DIASTOLIC BLOOD PRESSURE: 82 MMHG

## 2019-07-18 DIAGNOSIS — Z90.49 ACQUIRED ABSENCE OF OTHER SPECIFIED PARTS OF DIGESTIVE TRACT: Chronic | ICD-10-CM

## 2019-07-18 DIAGNOSIS — Z98.2 PRESENCE OF CEREBROSPINAL FLUID DRAINAGE DEVICE: Chronic | ICD-10-CM

## 2019-07-18 DIAGNOSIS — R10.9 UNSPECIFIED ABDOMINAL PAIN: ICD-10-CM

## 2019-07-18 DIAGNOSIS — Z98.890 OTHER SPECIFIED POSTPROCEDURAL STATES: Chronic | ICD-10-CM

## 2019-07-18 LAB
ALBUMIN SERPL ELPH-MCNC: 3.7 G/DL — SIGNIFICANT CHANGE UP (ref 3.3–5)
ALP SERPL-CCNC: 94 U/L — SIGNIFICANT CHANGE UP (ref 40–120)
ALT FLD-CCNC: 7 U/L — LOW (ref 10–45)
ANION GAP SERPL CALC-SCNC: 13 MMOL/L — SIGNIFICANT CHANGE UP (ref 5–17)
APPEARANCE UR: CLEAR — SIGNIFICANT CHANGE UP
AST SERPL-CCNC: 11 U/L — SIGNIFICANT CHANGE UP (ref 10–40)
BACTERIA # UR AUTO: NEGATIVE — SIGNIFICANT CHANGE UP
BASE EXCESS BLDV CALC-SCNC: 6.5 MMOL/L — HIGH (ref -2–2)
BASOPHILS # BLD AUTO: 0.1 K/UL — SIGNIFICANT CHANGE UP (ref 0–0.2)
BASOPHILS NFR BLD AUTO: 0.7 % — SIGNIFICANT CHANGE UP (ref 0–2)
BILIRUB SERPL-MCNC: 0.4 MG/DL — SIGNIFICANT CHANGE UP (ref 0.2–1.2)
BILIRUB UR-MCNC: NEGATIVE — SIGNIFICANT CHANGE UP
BLD GP AB SCN SERPL QL: NEGATIVE — SIGNIFICANT CHANGE UP
BUN SERPL-MCNC: 8 MG/DL — SIGNIFICANT CHANGE UP (ref 7–23)
CA-I SERPL-SCNC: 1.12 MMOL/L — SIGNIFICANT CHANGE UP (ref 1.12–1.3)
CALCIUM SERPL-MCNC: 9.7 MG/DL — SIGNIFICANT CHANGE UP (ref 8.4–10.5)
CHLORIDE BLDV-SCNC: 98 MMOL/L — SIGNIFICANT CHANGE UP (ref 96–108)
CHLORIDE SERPL-SCNC: 93 MMOL/L — LOW (ref 96–108)
CO2 BLDV-SCNC: 34 MMOL/L — HIGH (ref 22–30)
CO2 SERPL-SCNC: 28 MMOL/L — SIGNIFICANT CHANGE UP (ref 22–31)
COLOR SPEC: SIGNIFICANT CHANGE UP
CREAT SERPL-MCNC: 0.65 MG/DL — SIGNIFICANT CHANGE UP (ref 0.5–1.3)
DIFF PNL FLD: NEGATIVE — SIGNIFICANT CHANGE UP
EOSINOPHIL # BLD AUTO: 0.2 K/UL — SIGNIFICANT CHANGE UP (ref 0–0.5)
EOSINOPHIL NFR BLD AUTO: 2.2 % — SIGNIFICANT CHANGE UP (ref 0–6)
EPI CELLS # UR: 0 /HPF — SIGNIFICANT CHANGE UP
GAS PNL BLDV: 132 MMOL/L — LOW (ref 135–145)
GAS PNL BLDV: SIGNIFICANT CHANGE UP
GAS PNL BLDV: SIGNIFICANT CHANGE UP
GLUCOSE BLDV-MCNC: 106 MG/DL — HIGH (ref 70–99)
GLUCOSE SERPL-MCNC: 110 MG/DL — HIGH (ref 70–99)
GLUCOSE UR QL: NEGATIVE — SIGNIFICANT CHANGE UP
HCO3 BLDV-SCNC: 32 MMOL/L — HIGH (ref 21–29)
HCT VFR BLD CALC: 36.7 % — SIGNIFICANT CHANGE UP (ref 34.5–45)
HCT VFR BLDA CALC: 38 % — LOW (ref 39–50)
HGB BLD CALC-MCNC: 12.2 G/DL — SIGNIFICANT CHANGE UP (ref 11.5–15.5)
HGB BLD-MCNC: 12.3 G/DL — SIGNIFICANT CHANGE UP (ref 11.5–15.5)
HYALINE CASTS # UR AUTO: 0 /LPF — SIGNIFICANT CHANGE UP (ref 0–2)
KETONES UR-MCNC: NEGATIVE — SIGNIFICANT CHANGE UP
LACTATE BLDV-MCNC: 1.7 MMOL/L — SIGNIFICANT CHANGE UP (ref 0.7–2)
LEUKOCYTE ESTERASE UR-ACNC: NEGATIVE — SIGNIFICANT CHANGE UP
LYMPHOCYTES # BLD AUTO: 1.7 K/UL — SIGNIFICANT CHANGE UP (ref 1–3.3)
LYMPHOCYTES # BLD AUTO: 19.7 % — SIGNIFICANT CHANGE UP (ref 13–44)
MCHC RBC-ENTMCNC: 31.5 PG — SIGNIFICANT CHANGE UP (ref 27–34)
MCHC RBC-ENTMCNC: 33.6 GM/DL — SIGNIFICANT CHANGE UP (ref 32–36)
MCV RBC AUTO: 93.7 FL — SIGNIFICANT CHANGE UP (ref 80–100)
MONOCYTES # BLD AUTO: 0.8 K/UL — SIGNIFICANT CHANGE UP (ref 0–0.9)
MONOCYTES NFR BLD AUTO: 9.1 % — SIGNIFICANT CHANGE UP (ref 2–14)
NEUTROPHILS # BLD AUTO: 5.9 K/UL — SIGNIFICANT CHANGE UP (ref 1.8–7.4)
NEUTROPHILS NFR BLD AUTO: 68.3 % — SIGNIFICANT CHANGE UP (ref 43–77)
NITRITE UR-MCNC: NEGATIVE — SIGNIFICANT CHANGE UP
PCO2 BLDV: 55 MMHG — HIGH (ref 35–50)
PH BLDV: 7.39 — SIGNIFICANT CHANGE UP (ref 7.35–7.45)
PH UR: 6.5 — SIGNIFICANT CHANGE UP (ref 5–8)
PLATELET # BLD AUTO: 720 K/UL — HIGH (ref 150–400)
PO2 BLDV: 22 MMHG — LOW (ref 25–45)
POTASSIUM BLDV-SCNC: 5.2 MMOL/L — SIGNIFICANT CHANGE UP (ref 3.5–5.3)
POTASSIUM SERPL-MCNC: 3.6 MMOL/L — SIGNIFICANT CHANGE UP (ref 3.5–5.3)
POTASSIUM SERPL-SCNC: 3.6 MMOL/L — SIGNIFICANT CHANGE UP (ref 3.5–5.3)
PROT SERPL-MCNC: 7.2 G/DL — SIGNIFICANT CHANGE UP (ref 6–8.3)
PROT UR-MCNC: NEGATIVE — SIGNIFICANT CHANGE UP
RBC # BLD: 3.92 M/UL — SIGNIFICANT CHANGE UP (ref 3.8–5.2)
RBC # FLD: 14.5 % — SIGNIFICANT CHANGE UP (ref 10.3–14.5)
RBC CASTS # UR COMP ASSIST: 3 /HPF — SIGNIFICANT CHANGE UP (ref 0–4)
RH IG SCN BLD-IMP: POSITIVE — SIGNIFICANT CHANGE UP
SAO2 % BLDV: 30 % — LOW (ref 67–88)
SODIUM SERPL-SCNC: 134 MMOL/L — LOW (ref 135–145)
SP GR SPEC: 1.01 — LOW (ref 1.01–1.02)
UROBILINOGEN FLD QL: NEGATIVE — SIGNIFICANT CHANGE UP
WBC # BLD: 8.7 K/UL — SIGNIFICANT CHANGE UP (ref 3.8–10.5)
WBC # FLD AUTO: 8.7 K/UL — SIGNIFICANT CHANGE UP (ref 3.8–10.5)
WBC UR QL: 2 /HPF — SIGNIFICANT CHANGE UP (ref 0–5)

## 2019-07-18 PROCEDURE — 71046 X-RAY EXAM CHEST 2 VIEWS: CPT | Mod: 26

## 2019-07-18 PROCEDURE — 99285 EMERGENCY DEPT VISIT HI MDM: CPT

## 2019-07-18 PROCEDURE — 99223 1ST HOSP IP/OBS HIGH 75: CPT

## 2019-07-18 PROCEDURE — 93010 ELECTROCARDIOGRAM REPORT: CPT

## 2019-07-18 PROCEDURE — 74177 CT ABD & PELVIS W/CONTRAST: CPT | Mod: 26

## 2019-07-18 RX ORDER — MORPHINE SULFATE 50 MG/1
2 CAPSULE, EXTENDED RELEASE ORAL ONCE
Refills: 0 | Status: DISCONTINUED | OUTPATIENT
Start: 2019-07-18 | End: 2019-07-18

## 2019-07-18 RX ORDER — ONDANSETRON 8 MG/1
4 TABLET, FILM COATED ORAL ONCE
Refills: 0 | Status: COMPLETED | OUTPATIENT
Start: 2019-07-18 | End: 2019-07-18

## 2019-07-18 RX ORDER — ACETAMINOPHEN 500 MG
650 TABLET ORAL ONCE
Refills: 0 | Status: COMPLETED | OUTPATIENT
Start: 2019-07-18 | End: 2019-07-18

## 2019-07-18 RX ORDER — SODIUM CHLORIDE 9 MG/ML
1000 INJECTION INTRAMUSCULAR; INTRAVENOUS; SUBCUTANEOUS ONCE
Refills: 0 | Status: COMPLETED | OUTPATIENT
Start: 2019-07-18 | End: 2019-07-18

## 2019-07-18 RX ORDER — MORPHINE SULFATE 50 MG/1
4 CAPSULE, EXTENDED RELEASE ORAL ONCE
Refills: 0 | Status: DISCONTINUED | OUTPATIENT
Start: 2019-07-18 | End: 2019-07-18

## 2019-07-18 RX ORDER — ONDANSETRON 8 MG/1
4 TABLET, FILM COATED ORAL ONCE
Refills: 0 | Status: DISCONTINUED | OUTPATIENT
Start: 2019-07-18 | End: 2019-07-18

## 2019-07-18 RX ADMIN — MORPHINE SULFATE 2 MILLIGRAM(S): 50 CAPSULE, EXTENDED RELEASE ORAL at 22:46

## 2019-07-18 RX ADMIN — ONDANSETRON 4 MILLIGRAM(S): 8 TABLET, FILM COATED ORAL at 17:49

## 2019-07-18 RX ADMIN — MORPHINE SULFATE 4 MILLIGRAM(S): 50 CAPSULE, EXTENDED RELEASE ORAL at 17:27

## 2019-07-18 RX ADMIN — SODIUM CHLORIDE 1000 MILLILITER(S): 9 INJECTION INTRAMUSCULAR; INTRAVENOUS; SUBCUTANEOUS at 17:36

## 2019-07-18 RX ADMIN — MORPHINE SULFATE 4 MILLIGRAM(S): 50 CAPSULE, EXTENDED RELEASE ORAL at 22:41

## 2019-07-18 RX ADMIN — Medication 650 MILLIGRAM(S): at 22:46

## 2019-07-18 NOTE — CONSULT NOTE ADULT - ATTENDING COMMENTS
Metastatic gallbladder cancer with peritoneal and pelvis metastasis.  Large volume malignant ascites.  IR evaluation for paracentesis.  Oncology evaluation - [facility].

## 2019-07-18 NOTE — ED PROVIDER NOTE - PHYSICAL EXAMINATION
GEN: Well Appearing, Nontoxic, NAD  HEENT: NC/AT, Symm Facies. EOMI  CV: +S1S2, RRR w/o m/g/r  RESP: CTAB   ABD: +distention +BS +tenderness to palpation diffusely  EXT/MSK: No lower extremity edema or calf tenderness.  SKIN: No erythema, lesions or rash  Neuro: Grossly intact, AOX3 with normal speech, Sensation intact, strength equal Gait normal

## 2019-07-18 NOTE — ED PROVIDER NOTE - ATTENDING CONTRIBUTION TO CARE
Dr Reagan Note: 63 yo F w pmhx cerebral aneurysm, s/p  shunt, HTN, HLD, s/p lap cholecystectomy, path positive for GB adenocarcinoma s/p liver rxn w/ mets to the abd, currently on chemo, now p/w abdominal pain, nausea, vomiting, distension to abdomen and not passing pas   pt w hx of SBO  No cp, no sob, no focal weakness, numbness or tingling   No fever     Gen: no acute distress non toxic alert and coherent, no cyanosis   HEENT: atraumatic,  no scleral icterus  EOMI   Neck: no midline tenderness, supple  Lungs: Air entry good, clear to auscultation and percussion   CVS: reg HR S1/S2 no murmur no gallop   ABD: diffuse tenderness and distention,   Extremities: No deformities, no edema, no calf tenderness  Neuro: AA and Ox3, CNII-XII grossly intact       Abdominal pain, N/V, distension, concern for SBO, hx of same   Plan for labs, CT abdomen/pelvis   dispo pending above  Pt stable

## 2019-07-18 NOTE — ED ADULT NURSE NOTE - OBJECTIVE STATEMENT
64y female arrived to ED complaining of ab pain. PMHx gallbladder CA, liver resection, osteoarthritis, HTN, HLD, cerebral aneurysm, PE. Patient reports several weeks of constant ab pain with intermittent sharp/stabbing pains in the LLQ radiating to the lower back bilaterally. Patient did not take pain meds PTA. Patient was hospitalized [facility] in June 2019 for ab pain. Patient taking Eliquis. Patient endorses SOB, burning urination, constipation, distended ab. Patient had intermittent n/v. No flatus. Denies CP, chills, fever, headache. Patient A&Ox4, ambulatory, VS stable. Patient has port on the left anterior chest wall.

## 2019-07-18 NOTE — ED PROVIDER NOTE - OBJECTIVE STATEMENT
64F w/ PMH of cerebral aneurysm, s/p  shunt, HTN, HLD, s/p lap cholecystectomy, path positive for GB adenocarcinoma s/p liver rxn w/ mets to the abd, currently on chemo, now p/w   +N/V (x3)  +Inability to tolerate PO  +No BM, no flatus   +4/10 contanstant Abdominal pain (Epi, left sided), states that she feels a mass   +Distention   +Constipation   +Blurry vision   +SOB  +Bladder fullness, with relief with urination   last BM Tuesday, symptoms started Tuesday  Patient was in [facility] on 6/13-6/15 mild SBO w/ TP in the terminal ileum with CT chest showing small b/l PE did not want NG tube, discharged with follow up with oncology.   Diagnosed with PE on last hospital admission: Eliquis, didn't take yesterday   Denies chest pain, fever, sick contacts, recent travel. 64F w/ PMH of cerebral aneurysm, s/p  shunt, HTN, HLD, s/p lap cholecystectomy, path positive for GB adenocarcinoma s/p liver rxn w/ mets to the abd, currently on chemo, now p/w   +N/V (x3)  +Inability to tolerate PO  +No BM, no flatus   +4/10 constant Abdominal pain (Epi, left sided), states that she feels a mass   +Distention   +Constipation   +Blurry vision   +SOB  +Bladder fullness, with relief with urination   last BM Tuesday, symptoms started Tuesday  Patient was in [facility] on 6/13-6/15 mild SBO w/ TP in the terminal ileum with CT chest showing small b/l PE did not want NG tube, discharged with follow up with oncology.   Diagnosed with PE on last hospital admission: Eliquis, didn't take yesterday   Denies chest pain, fever, sick contacts, recent travel. 64F w/ PMH of cerebral aneurysm, s/p  shunt, HTN, HLD, s/p lap cholecystectomy, path positive for GB adenocarcinoma s/p liver rxn w/ mets to the abd, currently on chemo, now p/w   +N/V (x3)  +Inability to tolerate PO  +No BM, no flatus   +4/10 constant Abdominal pain (Epi, left sided), states that she feels a mass   +Distention   +Constipation    +mildly SOB  +Bladder fullness, with relief with urination   last BM Tuesday, symptoms started Tuesday  Patient was in [facility] on 6/13-6/15 mild SBO w/ TP in the terminal ileum with CT chest showing small b/l PE did not want NG tube, discharged with follow up with oncology.   Diagnosed with PE on last hospital admission: Eliquis, didn't take yesterday, but did today  Denies chest pain, fever, sick contacts, recent travel.

## 2019-07-18 NOTE — ED ADULT NURSE NOTE - NSIMPLEMENTINTERV_GEN_ALL_ED
Implemented All Universal Safety Interventions:  [place] to call system. Call bell, personal items and telephone within reach. Instruct patient to call for assistance. Room bathroom lighting operational. Non-slip footwear when patient is off stretcher. Physically safe environment: no spills, clutter or unnecessary equipment. Stretcher in lowest position, wheels locked, appropriate side rails in place.

## 2019-07-18 NOTE — ED PROVIDER NOTE - NS ED ROS FT
Constitutional: No fever or chills  Eyes: +Change in vision   CV: No chest pain or lower extremity edema  Resp: + SOB no cough  GI: +abd pain. + nausea and vomiting. No diarrhea. + constipation. +abdominal distension   : No dysuria, hematuria.   MSK: +chronic back pain, per patient unchanged   Skin: No rash  Neuro: No headache. No numbness or tingling. No weakness.

## 2019-07-19 ENCOUNTER — RESULT REVIEW (OUTPATIENT)
Age: 64
End: 2019-07-19

## 2019-07-19 DIAGNOSIS — I82.220 ACUTE EMBOLISM AND THROMBOSIS OF INFERIOR VENA CAVA: ICD-10-CM

## 2019-07-19 DIAGNOSIS — N13.30 UNSPECIFIED HYDRONEPHROSIS: ICD-10-CM

## 2019-07-19 DIAGNOSIS — R10.9 UNSPECIFIED ABDOMINAL PAIN: ICD-10-CM

## 2019-07-19 DIAGNOSIS — Z29.9 ENCOUNTER FOR PROPHYLACTIC MEASURES, UNSPECIFIED: ICD-10-CM

## 2019-07-19 DIAGNOSIS — M54.9 DORSALGIA, UNSPECIFIED: ICD-10-CM

## 2019-07-19 DIAGNOSIS — C23 MALIGNANT NEOPLASM OF GALLBLADDER: ICD-10-CM

## 2019-07-19 DIAGNOSIS — I26.99 OTHER PULMONARY EMBOLISM WITHOUT ACUTE COR PULMONALE: ICD-10-CM

## 2019-07-19 DIAGNOSIS — I10 ESSENTIAL (PRIMARY) HYPERTENSION: ICD-10-CM

## 2019-07-19 LAB
ALBUMIN FLD-MCNC: 3 G/DL — SIGNIFICANT CHANGE UP
ALBUMIN SERPL ELPH-MCNC: 3.4 G/DL — SIGNIFICANT CHANGE UP (ref 3.3–5)
ALP SERPL-CCNC: 97 U/L — SIGNIFICANT CHANGE UP (ref 40–120)
ALT FLD-CCNC: 5 U/L — LOW (ref 10–45)
ANION GAP SERPL CALC-SCNC: 20 MMOL/L — HIGH (ref 5–17)
APTT BLD: 33.1 SEC — SIGNIFICANT CHANGE UP (ref 27.5–36.3)
AST SERPL-CCNC: 12 U/L — SIGNIFICANT CHANGE UP (ref 10–40)
B PERT IGG+IGM PNL SER: ABNORMAL
BILIRUB DIRECT SERPL-MCNC: <0.1 MG/DL — SIGNIFICANT CHANGE UP (ref 0–0.2)
BILIRUB INDIRECT FLD-MCNC: >0.3 MG/DL — SIGNIFICANT CHANGE UP (ref 0.2–1)
BILIRUB SERPL-MCNC: 0.4 MG/DL — SIGNIFICANT CHANGE UP (ref 0.2–1.2)
BUN SERPL-MCNC: 9 MG/DL — SIGNIFICANT CHANGE UP (ref 7–23)
CALCIUM SERPL-MCNC: 9.3 MG/DL — SIGNIFICANT CHANGE UP (ref 8.4–10.5)
CHLORIDE SERPL-SCNC: 97 MMOL/L — SIGNIFICANT CHANGE UP (ref 96–108)
CO2 SERPL-SCNC: 21 MMOL/L — LOW (ref 22–31)
COLOR FLD: YELLOW — SIGNIFICANT CHANGE UP
CREAT SERPL-MCNC: 0.74 MG/DL — SIGNIFICANT CHANGE UP (ref 0.5–1.3)
CULTURE RESULTS: SIGNIFICANT CHANGE UP
EOSINOPHIL # FLD: 6 % — SIGNIFICANT CHANGE UP
FLUID INTAKE SUBSTANCE CLASS: SIGNIFICANT CHANGE UP
FLUID SEGMENTED GRANULOCYTES: 30 % — SIGNIFICANT CHANGE UP
GLUCOSE FLD-MCNC: 87 MG/DL — SIGNIFICANT CHANGE UP
GLUCOSE SERPL-MCNC: 81 MG/DL — SIGNIFICANT CHANGE UP (ref 70–99)
GRAM STN FLD: SIGNIFICANT CHANGE UP
HCT VFR BLD CALC: 39.4 % — SIGNIFICANT CHANGE UP (ref 34.5–45)
HGB BLD-MCNC: 12.9 G/DL — SIGNIFICANT CHANGE UP (ref 11.5–15.5)
INR BLD: 1.27 RATIO — HIGH (ref 0.88–1.16)
LDH SERPL L TO P-CCNC: 158 U/L — SIGNIFICANT CHANGE UP
LYMPHOCYTES # FLD: 23 % — SIGNIFICANT CHANGE UP
MAGNESIUM SERPL-MCNC: 2 MG/DL — SIGNIFICANT CHANGE UP (ref 1.6–2.6)
MCHC RBC-ENTMCNC: 31.3 PG — SIGNIFICANT CHANGE UP (ref 27–34)
MCHC RBC-ENTMCNC: 32.8 GM/DL — SIGNIFICANT CHANGE UP (ref 32–36)
MCV RBC AUTO: 95.4 FL — SIGNIFICANT CHANGE UP (ref 80–100)
MESOTHL CELL # FLD: 1 % — SIGNIFICANT CHANGE UP
MONOS+MACROS # FLD: 38 % — SIGNIFICANT CHANGE UP
OTHER CELLS FLD MANUAL: 2 % — SIGNIFICANT CHANGE UP
PHOSPHATE SERPL-MCNC: 5.3 MG/DL — HIGH (ref 2.5–4.5)
PLATELET # BLD AUTO: 551 K/UL — HIGH (ref 150–400)
POTASSIUM SERPL-MCNC: 3.8 MMOL/L — SIGNIFICANT CHANGE UP (ref 3.5–5.3)
POTASSIUM SERPL-SCNC: 3.8 MMOL/L — SIGNIFICANT CHANGE UP (ref 3.5–5.3)
PROT FLD-MCNC: 4.5 G/DL — SIGNIFICANT CHANGE UP
PROT SERPL-MCNC: 6.9 G/DL — SIGNIFICANT CHANGE UP (ref 6–8.3)
PROTHROM AB SERPL-ACNC: 14.6 SEC — HIGH (ref 10–12.9)
RBC # BLD: 4.13 M/UL — SIGNIFICANT CHANGE UP (ref 3.8–5.2)
RBC # FLD: 14.7 % — HIGH (ref 10.3–14.5)
RCV VOL RI: 230 /UL — HIGH (ref 0–5)
SODIUM SERPL-SCNC: 138 MMOL/L — SIGNIFICANT CHANGE UP (ref 135–145)
SPECIMEN SOURCE: SIGNIFICANT CHANGE UP
SPECIMEN SOURCE: SIGNIFICANT CHANGE UP
TOTAL NUCLEATED CELL COUNT, BODY FLUID: 590 /UL — HIGH (ref 0–5)
TUBE TYPE: SIGNIFICANT CHANGE UP
WBC # BLD: 6.7 K/UL — SIGNIFICANT CHANGE UP (ref 3.8–10.5)
WBC # FLD AUTO: 6.7 K/UL — SIGNIFICANT CHANGE UP (ref 3.8–10.5)

## 2019-07-19 PROCEDURE — 88305 TISSUE EXAM BY PATHOLOGIST: CPT | Mod: 26

## 2019-07-19 PROCEDURE — 12345: CPT | Mod: NC

## 2019-07-19 PROCEDURE — 74177 CT ABD & PELVIS W/CONTRAST: CPT | Mod: 26

## 2019-07-19 PROCEDURE — 99223 1ST HOSP IP/OBS HIGH 75: CPT | Mod: GC

## 2019-07-19 PROCEDURE — 88112 CYTOPATH CELL ENHANCE TECH: CPT | Mod: 26

## 2019-07-19 PROCEDURE — 49083 ABD PARACENTESIS W/IMAGING: CPT

## 2019-07-19 RX ORDER — SENNA PLUS 8.6 MG/1
2 TABLET ORAL AT BEDTIME
Refills: 0 | Status: DISCONTINUED | OUTPATIENT
Start: 2019-07-19 | End: 2019-08-01

## 2019-07-19 RX ORDER — OXYCODONE AND ACETAMINOPHEN 5; 325 MG/1; MG/1
1 TABLET ORAL EVERY 8 HOURS
Refills: 0 | Status: DISCONTINUED | OUTPATIENT
Start: 2019-07-19 | End: 2019-07-26

## 2019-07-19 RX ORDER — DOCUSATE SODIUM 100 MG
100 CAPSULE ORAL
Refills: 0 | Status: DISCONTINUED | OUTPATIENT
Start: 2019-07-19 | End: 2019-08-01

## 2019-07-19 RX ORDER — SENNA PLUS 8.6 MG/1
2 TABLET ORAL AT BEDTIME
Refills: 0 | Status: DISCONTINUED | OUTPATIENT
Start: 2019-07-19 | End: 2019-07-19

## 2019-07-19 RX ORDER — SODIUM CHLORIDE 9 MG/ML
1000 INJECTION INTRAMUSCULAR; INTRAVENOUS; SUBCUTANEOUS
Refills: 0 | Status: DISCONTINUED | OUTPATIENT
Start: 2019-07-19 | End: 2019-07-25

## 2019-07-19 RX ORDER — POLYETHYLENE GLYCOL 3350 17 G/17G
17 POWDER, FOR SOLUTION ORAL DAILY
Refills: 0 | Status: DISCONTINUED | OUTPATIENT
Start: 2019-07-19 | End: 2019-07-19

## 2019-07-19 RX ORDER — ACETAMINOPHEN 500 MG
650 TABLET ORAL EVERY 6 HOURS
Refills: 0 | Status: DISCONTINUED | OUTPATIENT
Start: 2019-07-19 | End: 2019-08-01

## 2019-07-19 RX ORDER — HYDROCHLOROTHIAZIDE 25 MG
25 TABLET ORAL DAILY
Refills: 0 | Status: DISCONTINUED | OUTPATIENT
Start: 2019-07-19 | End: 2019-07-19

## 2019-07-19 RX ORDER — DOCUSATE SODIUM 100 MG
100 CAPSULE ORAL
Refills: 0 | Status: DISCONTINUED | OUTPATIENT
Start: 2019-07-19 | End: 2019-07-19

## 2019-07-19 RX ORDER — POLYETHYLENE GLYCOL 3350 17 G/17G
17 POWDER, FOR SOLUTION ORAL
Refills: 0 | Status: DISCONTINUED | OUTPATIENT
Start: 2019-07-19 | End: 2019-08-01

## 2019-07-19 RX ORDER — APIXABAN 2.5 MG/1
5 TABLET, FILM COATED ORAL EVERY 12 HOURS
Refills: 0 | Status: DISCONTINUED | OUTPATIENT
Start: 2019-07-19 | End: 2019-07-19

## 2019-07-19 RX ORDER — LOSARTAN POTASSIUM 100 MG/1
100 TABLET, FILM COATED ORAL DAILY
Refills: 0 | Status: DISCONTINUED | OUTPATIENT
Start: 2019-07-19 | End: 2019-08-01

## 2019-07-19 RX ADMIN — POLYETHYLENE GLYCOL 3350 17 GRAM(S): 17 POWDER, FOR SOLUTION ORAL at 17:55

## 2019-07-19 RX ADMIN — SODIUM CHLORIDE 60 MILLILITER(S): 9 INJECTION INTRAMUSCULAR; INTRAVENOUS; SUBCUTANEOUS at 21:33

## 2019-07-19 RX ADMIN — Medication 650 MILLIGRAM(S): at 04:03

## 2019-07-19 RX ADMIN — OXYCODONE AND ACETAMINOPHEN 1 TABLET(S): 5; 325 TABLET ORAL at 10:04

## 2019-07-19 RX ADMIN — SODIUM CHLORIDE 60 MILLILITER(S): 9 INJECTION INTRAMUSCULAR; INTRAVENOUS; SUBCUTANEOUS at 15:03

## 2019-07-19 RX ADMIN — Medication 100 MILLIGRAM(S): at 21:32

## 2019-07-19 RX ADMIN — OXYCODONE AND ACETAMINOPHEN 1 TABLET(S): 5; 325 TABLET ORAL at 10:35

## 2019-07-19 RX ADMIN — LOSARTAN POTASSIUM 100 MILLIGRAM(S): 100 TABLET, FILM COATED ORAL at 06:37

## 2019-07-19 RX ADMIN — SENNA PLUS 2 TABLET(S): 8.6 TABLET ORAL at 21:32

## 2019-07-19 RX ADMIN — OXYCODONE AND ACETAMINOPHEN 1 TABLET(S): 5; 325 TABLET ORAL at 18:50

## 2019-07-19 RX ADMIN — OXYCODONE AND ACETAMINOPHEN 1 TABLET(S): 5; 325 TABLET ORAL at 17:53

## 2019-07-19 RX ADMIN — Medication 25 MILLIGRAM(S): at 10:05

## 2019-07-19 NOTE — H&P ADULT - PROBLEM SELECTOR PLAN 8
- DVT: Eliquis  - Diet: NPO pending IR evaluation for paracentesis - DVT: Hold Eliquis - pending further heme/onc recs and IR recs  - Diet: NPO pending IR evaluation for paracentesis

## 2019-07-19 NOTE — PROGRESS NOTE ADULT - PROBLEM SELECTOR PLAN 8
- DVT: Hold Eliquis - pending further heme/onc recs and IR recs.  Plan to resume post procedure  - Diet: NPO pending IR evaluation for paracentesis

## 2019-07-19 NOTE — PROVIDER CONTACT NOTE (OTHER) - ACTION/TREATMENT ORDERED:
Diet advanced from NPO to regular diet. Provider to RN order placed to give percocet early, percocet given. Started on bowel regimen meds. Hold anticoagulants for 24hr, most likely to be given lovenox

## 2019-07-19 NOTE — H&P ADULT - NSHPREVIEWOFSYSTEMS_GEN_ALL_CORE
REVIEW OF SYSTEMS:    CONSTITUTIONAL: No weakness, fevers or chills  EYES: No vertigo or throat pain  ENT: No visual changes, eye pain  MOUTH: moist wing mucosal, no mouth ulcers  NECK: No pain or stiffness  RESPIRATORY: No cough, wheezing, hemoptysis; No shortness of breath  CARDIOVASCULAR: No chest pain or palpitations  GASTROINTESTINAL: No abdominal or epigastric pain. No nausea, vomiting, or hematemesis; No diarrhea or constipation. No melena or hematochezia.  GENITOURINARY: No dysuria, frequency or hematuria  NEUROLOGICAL: No numbness or weakness  SKIN: No itching, rashes REVIEW OF SYSTEMS:    CONSTITUTIONAL: + weakness, no fevers or chills  EYES: No vertigo or throat pain  ENT: No visual changes, eye pain  MOUTH: moist wing mucosal, no mouth ulcers  NECK: No pain or stiffness  RESPIRATORY: No cough, wheezing, hemoptysis; + shortness of breath  CARDIOVASCULAR: No chest pain or palpitations  GASTROINTESTINAL: + diffuse abdominal pain, nausea, vomiting, and constipation. No hematemesis; No diarrhea. No melena or hematochezia.  GENITOURINARY: No dysuria, frequency or hematuria  NEUROLOGICAL: No numbness or weakness  SKIN: No itching, rashes

## 2019-07-19 NOTE — H&P ADULT - PROBLEM SELECTOR PLAN 5
istop: [id number]  oxycodone-acetaminophen 5-325 mg tab, supply 90 for 23  - c/w home percocet for severe back pain istop # [id number]  oxycodone-acetaminophen 5-325 mg tab, supply 90 for 23days, refilled July  - c/w home percocet for severe back pain

## 2019-07-19 NOTE — PROVIDER CONTACT NOTE (OTHER) - BACKGROUND
Pt 64yoF,admit for abd pain,distention 2/2 ascites/malignancy,w/ PMH of PE on eliquis, HTN, gallbladder carcinoma s/p lap young & partial liver resection on chemo.

## 2019-07-19 NOTE — H&P ADULT - NSHPPHYSICALEXAM_GEN_ALL_CORE
Vital Signs Last 24 Hrs  T(C): 36.7 (18 Jul 2019 22:47), Max: 36.7 (18 Jul 2019 11:16)  T(F): 98 (18 Jul 2019 22:47), Max: 98 (18 Jul 2019 11:16)  HR: 92 (18 Jul 2019 22:47) (84 - 97)  BP: 135/93 (18 Jul 2019 22:47) (126/82 - 135/93)  BP(mean): --  RR: 17 (18 Jul 2019 22:47) (17 - 18)  SpO2: 96% (18 Jul 2019 22:47) (96% - 98%)    PHYSICAL EXAM:  GENERAL: NAD, well-developed  HEAD:  Atraumatic, Normocephalic  EYES: EOMI, PERRLA, conjunctiva and sclera clear  NECK: Supple, No JVD  CHEST/LUNG: Clear to auscultation bilaterally; No wheeze  HEART: Regular rate and rhythm; No murmurs, rubs, or gallops  ABDOMEN: Soft, Nontender, Nondistended; Bowel sounds present  EXTREMITIES:  2+ Peripheral Pulses, No clubbing, cyanosis, or edema  PSYCH: AAOx3  NEUROLOGY: non-focal  SKIN: No rashes or lesions Vital Signs Last 24 Hrs  T(C): 36.7 (18 Jul 2019 22:47), Max: 36.7 (18 Jul 2019 11:16)  T(F): 98 (18 Jul 2019 22:47), Max: 98 (18 Jul 2019 11:16)  HR: 92 (18 Jul 2019 22:47) (84 - 97)  BP: 135/93 (18 Jul 2019 22:47) (126/82 - 135/93)  BP(mean): --  RR: 17 (18 Jul 2019 22:47) (17 - 18)  SpO2: 96% (18 Jul 2019 22:47) (96% - 98%)    PHYSICAL EXAM:  GENERAL: NAD, well-developed  HEAD:  Atraumatic, Normocephalic  EYES: EOMI, PERRLA, conjunctiva and sclera clear  NECK: Supple, No JVD  CHEST/LUNG: Diminished breath sound on the left; No wheeze  HEART: Regular rate and rhythm; No murmurs, rubs, or gallops  ABDOMEN: Distended and diffusely tender to palpation, dull to percussion. +BS  EXTREMITIES:  2+ Peripheral Pulses, No clubbing, cyanosis, or edema  PSYCH: AAOx4  NEUROLOGY: 5/5 strength bilaterally in lower and upper extremity   SKIN: No rashes or lesions

## 2019-07-19 NOTE — PROGRESS NOTE ADULT - PROBLEM SELECTOR PLAN 2
Recently dx with lobar segmental PE on Eliquis.   -eliquis held for possible LVP today  -resume AFTER LVP by IR

## 2019-07-19 NOTE — H&P ADULT - PROBLEM SELECTOR PLAN 2
Recently dx with PE on Eliquis.   - c/w home Eliquis Recently dx with lobar segmental PE on Eliquis.   - c/w home Eliquis

## 2019-07-19 NOTE — H&P ADULT - PROBLEM SELECTOR PLAN 4
on third cycle of chemo with capecitabine  - Per ED note, private oncology group already called, f/u recs  - consider palliative care consult for further GOC

## 2019-07-19 NOTE — H&P ADULT - HISTORY OF PRESENT ILLNESS
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 Cartagena/[place]) w/ mets to the abd, was on chemo stopped 5 weeks ago), now p/w diffuse intermittent abdominal pain mostly in LLQ, nausea and NBNB emesis, decreased appetite with minimal PO intake, last flatus was yesterday and last BM was 2 days ago, Also reports some distension that has improved. Denies fevers, chills, diarrhea, SOB. Patient had recent admission on 6/13/19 where she was treated conservatively for malignant SBO, was also found to have bilateral PEs and was started on AC (Eliquis)    In ED, patient had CT scan which demonstrated increased volume ascites and no SBO. 64F w/ PMH of PE dx June 2019 (on eliquis), cerebral aneurysm s/p  shunt, HTN, HLD, metastatic GB carcinoma s/p lap cholecystectomy and partial liver ressection, on chemo (capecitabine?), last dose 5 weeks ago, now p/w abd pain & distention.     Patient reports intermittent diffuse abdominal pain with associated nausea and NBNB emesis x 3 episodes, as well as decreased appetite for the past week. Reports constipation last BM Tuesday. 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 Was dx with PE at that time as well and started on Eliquis. Reports never fulling recovered since discharge last time. BM were mostly small pellets of fecal matters. Otherwise denies fever, chill, CP, diarrhea, urinary concerns. Reports mild SOB, especially with exertion over the last week as well.     In ED, VSS. CT scan demonstrated increased volume ascites. No SBO but possible IVC clot. Vascular surgery c/s. s/p 1L bolus, Tylenol, morphine and Zofran.    At the time of my examination. Patient reports 2/10 abd pain, and no longer feeling nauseated.

## 2019-07-19 NOTE — CONSULT NOTE ADULT - ASSESSMENT
7/19 the pt came into the hospital here after being admitted here last month. She has been treated with conventional chemotherapy that has failed and she is seeking second opinions. Over the last few days she has developed progressive increase in abdominal girth and on Eliquis was found on ct of the abdomen to have a new IVC clot. She will require a tap but she needs to be off her Eliquis for at least 24 hours. After the pt will need to be placed on sq Lovenox as she appears to be a Eliquis failure.
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 Cartagena/[facility]) w/ mets to the abd, w now p/w LLQ abdominal pain likely due to increased ascites    - Admit to medicine for pain control and management  - CT venogram to evaluate IVC thrombus vs mixing artifact not needed as would make minimal contribution to management  - Surgery will follow  - Discussed with Dr. [place] Leone, PGY2  General Surgery, [id number]

## 2019-07-19 NOTE — PROGRESS NOTE ADULT - SUBJECTIVE AND OBJECTIVE BOX
Interventional Radiology Pre-Procedure Note    This is a 64y Female PMH of PE on eliquis, last dose yesterday morning, cerebral aneurysm s/p  shunt, HTN, HLD, metastatic GB carcinoma s/p lap cholecystectomy and partial liver resection who was admitted with abdominal pain and distension. CT abd/ pelvis showing new onset moderate ascites, IR requested for diagnostic and therapeutic paracentesis.     PAST MEDICAL & SURGICAL HISTORY:  Malignant neoplasm of gallbladder  History of osteoarthritis  Gallstones: dx: &#x27; 2015  Hypercholesterolemia: Borderline. Diet-managed  Multiparity  Hypertension  Vitamin D deficiency  Cerebral aneurysm: &#x27; 2009:  surgically treated  History of cholecystectomy: 12/16  S/P ventricular shunt placement: &#x27; 2009  S/P cerebral aneurysm operation: &#x27; 2009:  Clipping of Cerebral Aneurysm     Vital Signs Last 24 Hrs  T(C): 36.4 (19 Jul 2019 14:30), Max: 36.7 (18 Jul 2019 17:36)  T(F): 97.5 (19 Jul 2019 14:30), Max: 98 (18 Jul 2019 17:36)  HR: 73 (19 Jul 2019 14:30) (73 - 101)  BP: 126/86 (19 Jul 2019 14:30) (104/69 - 135/93)  RR: 18 (19 Jul 2019 14:30) (17 - 18)  SpO2: 94% (19 Jul 2019 14:30) (94% - 98%)    Allergies: No Known Allergies    Physical Exam: Gen: NAD, A&Ox4    Labs:                         12.3   8.7   )-----------( 720      ( 18 Jul 2019 14:17 )             36.7     07-18    134<L>  |  93<L>  |  8   ----------------------------<  110<H>  3.6   |  28  |  0.65    Ca    9.7      18 Jul 2019 14:17    TPro  7.2  /  Alb  3.7  /  TBili  0.4  /  DBili  x   /  AST  11  /  ALT  7<L>  /  AlkPhos  94  07-18    PT/INR - ( 19 Jul 2019 11:17 )   PT: 14.6 sec;   INR: 1.27 ratio         PTT - ( 19 Jul 2019 11:17 )  PTT:33.1 sec    Plan is for paracentesis. The procedure/ risks/ benefits/ alternatives were discussed with the pt and Informed consent obtained. All questions and concerns have been addressed at this time.

## 2019-07-19 NOTE — H&P ADULT - NSHPLABSRESULTS_GEN_ALL_CORE
Labs and images reviewed:                            12.3   8.7   )-----------( 720      ( 2019 14:17 )             36.7           134<L>  |  93<L>  |  8   ----------------------------<  110<H>  3.6   |  28  |  0.65    Ca    9.7      2019 14:17    TPro  7.2  /  Alb  3.7  /  TBili  0.4  /  DBili  x   /  AST  11  /  ALT  7<L>  /  AlkPhos  94                Urinalysis Basic - ( 2019 12:34 )    Color: Light Yellow / Appearance: Clear / S.008 / pH: x  Gluc: x / Ketone: Negative  / Bili: Negative / Urobili: Negative   Blood: x / Protein: Negative / Nitrite: Negative   Leuk Esterase: Negative / RBC: 3 /hpf / WBC 2 /HPF   Sq Epi: x / Non Sq Epi: 0 /hpf / Bacteria: Negative      Lactate Trend      CAPILLARY BLOOD GLUCOSE      < from: CT Abdomen and Pelvis w/ Oral Cont and w/ IV Cont (19 @ 15:32) >    IMPRESSION:     IVC thrombus versus mixing artifact.    Large cystic and solid pelvic masses with peritoneal carcinomatosis again   noted.    Partially loculated large volume ascites is increased.    Mild bilateral hydroureteronephrosis, new on the left.    Dilated main pancreatic duct with cutoff in the neck. Additional   infiltrative periportal soft tissue with portal vein narrowing.      < end of copied text > Labs and images reviewed:                          12.3   8.7   )-----------( 720      ( 2019 14:17 )             36.7           134<L>  |  93<L>  |  8   ----------------------------<  110<H>  3.6   |  28  |  0.65    Ca    9.7      2019 14:17    TPro  7.2  /  Alb  3.7  /  TBili  0.4  /  DBili  x   /  AST  11  /  ALT  7<L>  /  AlkPhos  94            Urinalysis Basic - ( 2019 12:34 )    Color: Light Yellow / Appearance: Clear / S.008 / pH: x  Gluc: x / Ketone: Negative  / Bili: Negative / Urobili: Negative   Blood: x / Protein: Negative / Nitrite: Negative   Leuk Esterase: Negative / RBC: 3 /hpf / WBC 2 /HPF   Sq Epi: x / Non Sq Epi: 0 /hpf / Bacteria: Negative      Lactate Trend      CAPILLARY BLOOD GLUCOSE      < from: CT Abdomen and Pelvis w/ Oral Cont and w/ IV Cont (19 @ 15:32) >    IMPRESSION:     IVC thrombus versus mixing artifact.    Large cystic and solid pelvic masses with peritoneal carcinomatosis again   noted.    Partially loculated large volume ascites is increased.    Mild bilateral hydroureteronephrosis, new on the left.    Dilated main pancreatic duct with cutoff in the neck. Additional   infiltrative periportal soft tissue with portal vein narrowing.    < end of copied text >    EKG: Reviewed, NSR, no acute ST changes

## 2019-07-19 NOTE — H&P ADULT - PROBLEM SELECTOR PLAN 1
w/ associated abd distention. Likely 2/2 worsening ascites in the setting malignancy + constipation.  - CT A/P negative for SBO, and increased ascites  - IR consult in AM for paracentesis  - c/w aggressive BM regimen  - consider edema if no BM mynor  - c/w Tylenol PRN for mod/mod pain, and percocet for severe pain  - PRN Zofran for nausea  - daily EKG for qtc monitoring w/ associated abd distention. Likely 2/2 worsening ascites in the setting malignancy + constipation.  - CT A/P negative for SBO, and increased ascites  - IR consult in AM for paracentesis  - Bowel regimen after paracentesis   - c/w Tylenol PRN for mod/mod pain, and percocet for severe pain  - PRN Zofran for nausea  - daily EKG for qtc monitoring

## 2019-07-19 NOTE — H&P ADULT - ASSESSMENT
64F w/ PMH of PE dx June 2019 (on eliquis), cerebral aneurysm s/p  shunt, HTN, metastatic GB carcinoma s/p lap cholecystectomy and partial liver resection on chemo (capecitabine?), last dose 5 weeks ago, now p/w abd pain and distention likely secondary to worsening ascites from malignancy + chronic constipation.

## 2019-07-19 NOTE — H&P ADULT - PROBLEM SELECTOR PLAN 6
Mild hydroureteronephrosis on CT scan, likely secondary to mets compressing on the kidney organs  - adequate urine output at the moment  - daily BMP for kidney function monitoring Mild hydroureteronephrosis on CT scan, likely secondary to mets /ascites   - pending IR drain of ascites  - adequate urine output at the moment  - daily BMP for kidney function monitoring

## 2019-07-19 NOTE — H&P ADULT - PROBLEM SELECTOR PLAN 3
suspicious IVC thrombus on CT scan  - vascular surgery consulted, heraclio recs  - recommended CT venogram to evaluate IVC thrombus, however, it would   - defer to day team for need of further evaluation suspicious IVC thrombus on CT scan  - vascular surgery consulted, heraclio recs - per vacular, no need for CT venogram  - to speak with Onc about needs to change AC given possible IVC thrombus

## 2019-07-19 NOTE — PROGRESS NOTE ADULT - SUBJECTIVE AND OBJECTIVE BOX
Interventional Radiology  Procedure Note    Procedure: Paracentesis    Pre- Procedure Diagnosis: Metastatic gallbladder CA with new onset ascites     Post- Procedure Diagnosis: same as above    Physician: Eddie   PA: Selena Jacobson PA-C    Needle: 5Fr drainer    Ascites Drained: 3.6L    Color: clear yellow    Specimens: sent    Albumin: none    Estimated Blood Loss: minimal    Complications: none    Preliminary Report:  Under sterile conditions, abdomen prepped and draped, lidocaine 1% local given, using ultrasound guidance to left lower quadrant/ a  5Fr drainage needle drained 3.6L of ascites. Dry sterile dressing applied to insertion site. No immediate complications. US demonstrated multiloculated ascites, largest pocket was accessed. Patient tolerated procedure well, VSS. ( Official report to follow).

## 2019-07-19 NOTE — CHART NOTE - NSCHARTNOTEFT_GEN_A_CORE
Pt s/p paracentesis. Discussed with IR reg; restarting AC. Wants AC restarted in 24 hours only. Spoke to Oncology;  Agrees to hold off on AC for 24 hours and to start Lovenox sq tomorrow 7/20 at 5 PM since she failed Eliquis.

## 2019-07-19 NOTE — H&P ADULT - NSHPSOCIALHISTORY_GEN_ALL_CORE
smoked for about 10 years, quit 10 years ago  casual EtOH intake  no recreational drug use smoked since age 18, 1/2ppd, quitted in 2009  Social EtOH intake  no recreational drug use  Lives at home with daughter. Retired.

## 2019-07-19 NOTE — CONSULT NOTE ADULT - SUBJECTIVE AND OBJECTIVE BOX
Patient is a 64y old  Female who presents with a chief complaint of abd pain (2019 02:04)      HPI:  64F w/ PMH of PE dx 2019 (on eliquis), cerebral aneurysm s/p  shunt, HTN, HLD, metastatic GB carcinoma s/p lap cholecystectomy and partial liver ressection, on chemo (capecitabine?), last dose 5 weeks ago, now p/w abd pain & distention.     Patient reports intermittent diffuse abdominal pain with associated nausea and NBNB emesis x 3 episodes, as well as decreased appetite for the past week. Reports constipation last BM Tuesday. 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 Was dx with PE at that time as well and started on Eliquis. Reports never fulling recovered since discharge last time. BM were mostly small pellets of fecal matters. Otherwise denies fever, chill, CP, diarrhea, urinary concerns. Reports mild SOB, especially with exertion over the last week as well.     In ED, VSS. CT scan demonstrated increased volume ascites. No SBO but possible IVC clot. Vascular surgery c/s. s/p 1L bolus, Tylenol, morphine and Zofran.    At the time of my examination. Patient reports 2/10 abd pain, and no longer feeling nauseated. (2019 02:04)      the pt came into the hospital here after being admitted here last month. She has been treated with conventional chemotherapy that has failed and she is seeking second opinions. Over the last few days she has developed progressive increase in abdominal girth and on eliquis was found on ct of the abdomen to have a new IVC clot. She will require a tap but she needs to be off her eliquis for at least 24 hours. After the pt will need to be placed on sq lovenox as she appears to be a eliquis failure.       MEDICATIONS  (STANDING):  hydrochlorothiazide 25 milliGRAM(s) Oral daily  losartan 100 milliGRAM(s) Oral daily    MEDICATIONS  (PRN):  acetaminophen   Tablet .. 650 milliGRAM(s) Oral every 6 hours PRN Temp greater or equal to 38C (100.4F), Mild Pain (1 - 3)  oxyCODONE    5 mG/acetaminophen 325 mG 1 Tablet(s) Oral every 8 hours PRN Severe Pain (7 - 10)      T(F): 97.9 (19 @ 11:32), Max: 98 (19 @ 17:36)  HR: 101 (19 @ 11:32) (84 - 101)  BP: 108/78 (19 @ 11:32) (104/69 - 135/93)  RR: 18 (19 @ 11:32) (17 - 18)  SpO2: 97% (19 @ 11:32) (96% - 98%)    LABS:    CBC Full  -  ( 2019 14:17 )  WBC Count : 8.7 K/uL  RBC Count : 3.92 M/uL  Hemoglobin : 12.3 g/dL  Hematocrit : 36.7 %  Platelet Count - Automated : 720 K/uL  Mean Cell Volume : 93.7 fl  Mean Cell Hemoglobin : 31.5 pg  Mean Cell Hemoglobin Concentration : 33.6 gm/dL  Auto Neutrophil # : 5.9 K/uL  Auto Lymphocyte # : 1.7 K/uL  Auto Monocyte # : 0.8 K/uL  Auto Eosinophil # : 0.2 K/uL  Auto Basophil # : 0.1 K/uL  Auto Neutrophil % : 68.3 %  Auto Lymphocyte % : 19.7 %  Auto Monocyte % : 9.1 %  Auto Eosinophil % : 2.2 %  Auto Basophil % : 0.7 %        134<L>  |  93<L>  |  8   ----------------------------<  110<H>  3.6   |  28  |  0.65    Ca    9.7      2019 14:17    TPro  7.2  /  Alb  3.7  /  TBili  0.4  /  DBili  x   /  AST  11  /  ALT  7<L>  /  AlkPhos  94      PT/INR - ( 2019 11:17 )   PT: 14.6 sec;   INR: 1.27 ratio         PTT - ( 2019 11:17 )  PTT:33.1 sec  Urinalysis Basic - ( 2019 12:34 )    Color: Light Yellow / Appearance: Clear / S.008 / pH: x  Gluc: x / Ketone: Negative  / Bili: Negative / Urobili: Negative   Blood: x / Protein: Negative / Nitrite: Negative   Leuk Esterase: Negative / RBC: 3 /hpf / WBC 2 /HPF   Sq Epi: x / Non Sq Epi: 0 /hpf / Bacteria: Negative        ROS:  Negative except for:    PAST MEDICAL & SURGICAL HISTORY:  Malignant neoplasm of gallbladder  History of osteoarthritis  Gallstones: dx: &#x27;   Hypercholesterolemia: Borderline. Diet-managed  Multiparity  Hypertension  Vitamin D deficiency  Cerebral aneurysm: &#x27; 2009:  surgically treated  History of cholecystectomy:   S/P ventricular shunt placement: &#x27; 2009  S/P cerebral aneurysm operation: &#x27; :  Clipping of Cerebral Aneurysm      SOCIAL HISTORY:    FAMILY HISTORY:  CAD (coronary artery disease): Father      Allergies    No Known Allergies    Intolerances        PHYSICAL EXAM  General: adult in NAD  HEENT: clear oropharynx, anicteric sclera, pink conjunctivae  Neck: supple  CV: normal S1S2 with no murmur rubs or gallops  Lungs: decreased breath sounds at the bases bilaterally  Abdomen: soft non-tender massively distended from ascites. no hepatosplenomegaly  Ext: no clubbing cyanosis or edema  Skin: no rashes and no petechiae  Neuro: alert and oriented X3 no focal deficits    BLOOD SMEAR INTERPRETATION:    RADIOLOGY :
[facility] General Surgery Consultation     Patient is a 64y old Female who presents with a chief complaint of abdominal pain.    HPI: 64F w/ PMH of PE (on eliquis) cerebral aneurysm, s/p  shunt, HTN, HLD, s/p lap cholecystectomy, path positive for GB adenocarcinoma s/p liver rxn (2017, Dr. Cartagena/[place]) w/ mets to the abd, was on chemo stopped 5 weeks ago), now p/w diffuse intermittent abdominal pain mostly in LLQ, nausea and NBNB emesis, decreased appetite with minimal PO intake, last flatus was yesterday and last BM was 2 days ago, Also reports some distension that has improved. Denies fevers, chills, diarrhea, SOB. Patient had recent admission on 19 where she was treated conservatively for malignant SBO, was also found to have bilateral PEs and was started on AC (Eliquis)    In ED, patient had CT scan which demonstrated increased volume ascites and no SBO.    PAST MEDICAL & SURGICAL HISTORY:  Malignant neoplasm of gallbladder  History of osteoarthritis  Gallstones: dx: &#x27;   Hypercholesterolemia: Borderline. Diet-managed  Multiparity  Hypertension  Vitamin D deficiency  Cerebral aneurysm: &#x27; :  surgically treated  History of cholecystectomy:   S/P ventricular shunt placement: &#x27;   S/P cerebral aneurysm operation: &#x27; :  Clipping of Cerebral Aneurysm      FAMILY HISTORY:  CAD (coronary artery disease): Father      SOCIAL HISTORY:    MEDICATIONS  (STANDING):    MEDICATIONS  (PRN):    Allergies    No Known Allergies    Intolerances        Vital Signs Last 24 Hrs  T(C): 36.7 (2019 17:36), Max: 36.7 (2019 11:16)  T(F): 98 (2019 17:36), Max: 98 (2019 11:16)  HR: 84 (2019 17:36) (84 - 97)  BP: 129/87 (2019 17:36) (126/82 - 129/87)  BP(mean): --  RR: 17 (2019 17:36) (17 - 18)  SpO2: 97% (2019 17:36) (97% - 98%)  Daily Height in cm: 160.02 (2019 11:16)    Daily     General: NAD, well-nourished  HEENT: Atraumatic, EOMI  Resp: Breathing comfortably on RA  CV: Normal sinus rhythm  Abd: soft, ND, LLQ tenderness  Ext: ROMIx4, motor strength intact x 4                          12.3   8.7   )-----------( 720      ( 2019 14:17 )             36.7     07-18    134<L>  |  93<L>  |  8   ----------------------------<  110<H>  3.6   |  28  |  0.65    Ca    9.7      2019 14:17    TPro  7.2  /  Alb  3.7  /  TBili  0.4  /  DBili  x   /  AST  11  /  ALT  7<L>  /  AlkPhos  94  07-18      Urinalysis Basic - ( 2019 12:34 )    Color: Light Yellow / Appearance: Clear / S.008 / pH: x  Gluc: x / Ketone: Negative  / Bili: Negative / Urobili: Negative   Blood: x / Protein: Negative / Nitrite: Negative   Leuk Esterase: Negative / RBC: 3 /hpf / WBC 2 /HPF   Sq Epi: x / Non Sq Epi: 0 /hpf / Bacteria: Negative        Radiographic Findings:       Assessment:

## 2019-07-20 LAB
ANION GAP SERPL CALC-SCNC: 14 MMOL/L — SIGNIFICANT CHANGE UP (ref 5–17)
BASOPHILS # BLD AUTO: 0.06 K/UL — SIGNIFICANT CHANGE UP (ref 0–0.2)
BASOPHILS NFR BLD AUTO: 0.9 % — SIGNIFICANT CHANGE UP (ref 0–2)
BUN SERPL-MCNC: 10 MG/DL — SIGNIFICANT CHANGE UP (ref 7–23)
CALCIUM SERPL-MCNC: 9 MG/DL — SIGNIFICANT CHANGE UP (ref 8.4–10.5)
CHLORIDE SERPL-SCNC: 99 MMOL/L — SIGNIFICANT CHANGE UP (ref 96–108)
CO2 SERPL-SCNC: 25 MMOL/L — SIGNIFICANT CHANGE UP (ref 22–31)
CREAT SERPL-MCNC: 0.77 MG/DL — SIGNIFICANT CHANGE UP (ref 0.5–1.3)
EOSINOPHIL # BLD AUTO: 0.17 K/UL — SIGNIFICANT CHANGE UP (ref 0–0.5)
EOSINOPHIL NFR BLD AUTO: 2.4 % — SIGNIFICANT CHANGE UP (ref 0–6)
GLUCOSE SERPL-MCNC: 102 MG/DL — HIGH (ref 70–99)
HCT VFR BLD CALC: 35.6 % — SIGNIFICANT CHANGE UP (ref 34.5–45)
HGB BLD-MCNC: 11.6 G/DL — SIGNIFICANT CHANGE UP (ref 11.5–15.5)
IMM GRANULOCYTES NFR BLD AUTO: 0.1 % — SIGNIFICANT CHANGE UP (ref 0–1.5)
LYMPHOCYTES # BLD AUTO: 1.05 K/UL — SIGNIFICANT CHANGE UP (ref 1–3.3)
LYMPHOCYTES # BLD AUTO: 15 % — SIGNIFICANT CHANGE UP (ref 13–44)
MAGNESIUM SERPL-MCNC: 1.8 MG/DL — SIGNIFICANT CHANGE UP (ref 1.6–2.6)
MCHC RBC-ENTMCNC: 30.4 PG — SIGNIFICANT CHANGE UP (ref 27–34)
MCHC RBC-ENTMCNC: 32.6 GM/DL — SIGNIFICANT CHANGE UP (ref 32–36)
MCV RBC AUTO: 93.2 FL — SIGNIFICANT CHANGE UP (ref 80–100)
MONOCYTES # BLD AUTO: 0.69 K/UL — SIGNIFICANT CHANGE UP (ref 0–0.9)
MONOCYTES NFR BLD AUTO: 9.9 % — SIGNIFICANT CHANGE UP (ref 2–14)
NEUTROPHILS # BLD AUTO: 5 K/UL — SIGNIFICANT CHANGE UP (ref 1.8–7.4)
NEUTROPHILS NFR BLD AUTO: 71.7 % — SIGNIFICANT CHANGE UP (ref 43–77)
PHOSPHATE SERPL-MCNC: 5 MG/DL — HIGH (ref 2.5–4.5)
PLATELET # BLD AUTO: 589 K/UL — HIGH (ref 150–400)
POTASSIUM SERPL-MCNC: 3.3 MMOL/L — LOW (ref 3.5–5.3)
POTASSIUM SERPL-SCNC: 3.3 MMOL/L — LOW (ref 3.5–5.3)
RBC # BLD: 3.82 M/UL — SIGNIFICANT CHANGE UP (ref 3.8–5.2)
RBC # FLD: 15.8 % — HIGH (ref 10.3–14.5)
SODIUM SERPL-SCNC: 138 MMOL/L — SIGNIFICANT CHANGE UP (ref 135–145)
WBC # BLD: 6.98 K/UL — SIGNIFICANT CHANGE UP (ref 3.8–10.5)
WBC # FLD AUTO: 6.98 K/UL — SIGNIFICANT CHANGE UP (ref 3.8–10.5)

## 2019-07-20 PROCEDURE — 99233 SBSQ HOSP IP/OBS HIGH 50: CPT

## 2019-07-20 RX ORDER — ENOXAPARIN SODIUM 100 MG/ML
80 INJECTION SUBCUTANEOUS EVERY 12 HOURS
Refills: 0 | Status: DISCONTINUED | OUTPATIENT
Start: 2019-07-20 | End: 2019-07-27

## 2019-07-20 RX ORDER — ONDANSETRON 8 MG/1
4 TABLET, FILM COATED ORAL ONCE
Refills: 0 | Status: COMPLETED | OUTPATIENT
Start: 2019-07-20 | End: 2019-07-21

## 2019-07-20 RX ORDER — POTASSIUM CHLORIDE 20 MEQ
40 PACKET (EA) ORAL EVERY 4 HOURS
Refills: 0 | Status: COMPLETED | OUTPATIENT
Start: 2019-07-20 | End: 2019-07-20

## 2019-07-20 RX ADMIN — OXYCODONE AND ACETAMINOPHEN 1 TABLET(S): 5; 325 TABLET ORAL at 02:33

## 2019-07-20 RX ADMIN — OXYCODONE AND ACETAMINOPHEN 1 TABLET(S): 5; 325 TABLET ORAL at 18:15

## 2019-07-20 RX ADMIN — Medication 40 MILLIEQUIVALENT(S): at 13:26

## 2019-07-20 RX ADMIN — Medication 100 MILLIGRAM(S): at 05:43

## 2019-07-20 RX ADMIN — Medication 100 MILLIGRAM(S): at 18:06

## 2019-07-20 RX ADMIN — LOSARTAN POTASSIUM 100 MILLIGRAM(S): 100 TABLET, FILM COATED ORAL at 05:43

## 2019-07-20 RX ADMIN — POLYETHYLENE GLYCOL 3350 17 GRAM(S): 17 POWDER, FOR SOLUTION ORAL at 18:06

## 2019-07-20 RX ADMIN — OXYCODONE AND ACETAMINOPHEN 1 TABLET(S): 5; 325 TABLET ORAL at 01:17

## 2019-07-20 RX ADMIN — Medication 40 MILLIEQUIVALENT(S): at 18:06

## 2019-07-20 RX ADMIN — ENOXAPARIN SODIUM 80 MILLIGRAM(S): 100 INJECTION SUBCUTANEOUS at 18:06

## 2019-07-20 RX ADMIN — Medication 10 MILLIGRAM(S): at 11:29

## 2019-07-20 RX ADMIN — OXYCODONE AND ACETAMINOPHEN 1 TABLET(S): 5; 325 TABLET ORAL at 09:24

## 2019-07-20 RX ADMIN — OXYCODONE AND ACETAMINOPHEN 1 TABLET(S): 5; 325 TABLET ORAL at 18:45

## 2019-07-20 RX ADMIN — OXYCODONE AND ACETAMINOPHEN 1 TABLET(S): 5; 325 TABLET ORAL at 08:54

## 2019-07-20 RX ADMIN — SODIUM CHLORIDE 60 MILLILITER(S): 9 INJECTION INTRAMUSCULAR; INTRAVENOUS; SUBCUTANEOUS at 05:45

## 2019-07-20 RX ADMIN — POLYETHYLENE GLYCOL 3350 17 GRAM(S): 17 POWDER, FOR SOLUTION ORAL at 05:43

## 2019-07-20 NOTE — PROGRESS NOTE ADULT - ASSESSMENT
64F w/ PMH of PE (on eliquis) cerebral aneurysm, s/p  shunt, HTN, HLD, s/p lap cholecystectomy, GB adenocarcinoma s/p liver rxn (2/2017, Dr. Cartagena/[place]) w/ mets to the abd, now p/w LLQ abdominal pain and ascites, s/p 3.7L Paracentesis w/ IR. CT scan demonstrating peritoneal carcinomatosis, no SBO.       - follow up paracentesis cytology  - consider increasing bowel regimen - suppository or dulcolax  - continue pain control  - rest of management per primary team  - surgery will continue to follow    [id number]

## 2019-07-20 NOTE — PROGRESS NOTE ADULT - ASSESSMENT
7/19 the pt came into the hospital here after being admitted here last month. She has been treated with conventional chemotherapy that has failed and she is seeking second opinions. Over the last few days she has developed progressive increase in abdominal girth and on Eliquis was found on ct of the abdomen to have a new IVC clot. She will require a tap but she needs to be off her Eliquis for at least 24 hours. After the pt will need to be placed on sq Lovenox as she appears to be a Eliquis failure.    7/19 the pt came into the hospital here after being admitted here last month. She has been treated with conventional chemotherapy that has failed and she is seeking second opinions. Over the last few days she has developed progressive increase in abdominal girth and on Eliquis was found on ct of the abdomen to have a new IVC clot. She will require a tap but she needs to be off her eliquis for at least 24 hours. After the pt will need to be placed on sq Lovenox as she appears to be a Eliquis failure. 7/20 pt had a paracentesis done yesterday and the fluid removed was about 3000 cc. The fluid unfortunately is compartmentalized and she feels the fluid returned quickly in her abdomen overnight. This may be from IVC blockage and this afternoon the pt should be placed on Lovenox 1.0 mg/kg sq twice a day q 12 hours as she appears to be refractory to the noac meds. The case was discussed in detail with the pt and the daughter at the bedside and the pt and the daughter will have to have teaching on Lovenox injections before discharge. Pt may need another paracentesis also before going home as the abdomen is very swollen again this am.

## 2019-07-20 NOTE — PROGRESS NOTE ADULT - SUBJECTIVE AND OBJECTIVE BOX
SUBJECTIVE: Pt s/p 3.7 L paracentesis by IR yesterday, and reports feeling much better since procedure. Reports having some mild nausea, no vomiting, -flatus/-BM x5 days, poor appetite. Reports she has been getting out of bed and walking around.       MEDICATIONS  (STANDING):  docusate sodium 100 milliGRAM(s) Oral two times a day  losartan 100 milliGRAM(s) Oral daily  polyethylene glycol 3350 17 Gram(s) Oral two times a day  senna 2 Tablet(s) Oral at bedtime  sodium chloride 0.9%. 1000 milliLiter(s) (60 mL/Hr) IV Continuous <Continuous>    MEDICATIONS  (PRN):  acetaminophen   Tablet .. 650 milliGRAM(s) Oral every 6 hours PRN Temp greater or equal to 38C (100.4F), Mild Pain (1 - 3)  oxyCODONE    5 mG/acetaminophen 325 mG 1 Tablet(s) Oral every 8 hours PRN Severe Pain (7 - 10)      OBJECTIVE:    Vital Signs Last 24 Hrs  T(C): 36.8 (2019 05:09), Max: 36.9 (2019 01:05)  T(F): 98.3 (2019 05:09), Max: 98.4 (2019 01:05)  HR: 76 (2019 05:09) (73 - 101)  BP: 134/78 (2019 05:09) (108/78 - 144/80)  BP(mean): --  RR: 18 (2019 05:09) (18 - 18)  SpO2: 96% (2019 05:09) (94% - 98%)    General Appearance: NAD, sitting up in bed  Neck: Supple  Chest: non-labored breathing, no respiratory distress  CV: Pulse regular presently  Abdomen: Soft, mildly and diffusely tender to palpation, significant distention still present  Extremities: warm and well perfused    I&O's Summary    2019 07:01  -  2019 07:00  --------------------------------------------------------  IN: 1290 mL / OUT: 200 mL / NET: 1090 mL    2019 07:01  -  2019 10:36  --------------------------------------------------------  IN: 360 mL / OUT: 0 mL / NET: 360 mL      I&O's Detail    2019 07:  -  2019 07:00  --------------------------------------------------------  IN:    Oral Fluid: 270 mL    sodium chloride 0.9%.: 1020 mL  Total IN: 1290 mL    OUT:    Voided: 200 mL  Total OUT: 200 mL    Total NET: 1090 mL      2019 07:  -  2019 10:36  --------------------------------------------------------  IN:    Oral Fluid: 240 mL    sodium chloride 0.9%.: 120 mL  Total IN: 360 mL    OUT:  Total OUT: 0 mL    Total NET: 360 mL            LABS:                        12.9   6.7   )-----------( 551      ( 2019 18:27 )             39.4     07-20    138  |  99  |  10  ----------------------------<  102<H>  3.3<L>   |  25  |  0.77    Ca    9.0      2019 07:11  Phos  5.0     07-20  Mg     1.8     -20    TPro  6.9  /  Alb  3.4  /  TBili  0.4  /  DBili  <0.1  /  AST  12  /  ALT  5<L>  /  AlkPhos  97  07-19    PT/INR - ( 2019 11:17 )   PT: 14.6 sec;   INR: 1.27 ratio         PTT - ( 2019 11:17 )  PTT:33.1 sec  Urinalysis Basic - ( 2019 12:34 )    Color: Light Yellow / Appearance: Clear / S.008 / pH: x  Gluc: x / Ketone: Negative  / Bili: Negative / Urobili: Negative   Blood: x / Protein: Negative / Nitrite: Negative   Leuk Esterase: Negative / RBC: 3 /hpf / WBC 2 /HPF   Sq Epi: x / Non Sq Epi: 0 /hpf / Bacteria: Negative        RADIOLOGY & ADDITIONAL STUDIES: SUBJECTIVE: Pt s/p 3.7 L paracentesis by IR yesterday, and reports feeling much better since procedure. Reports having some mild nausea, no vomiting, -flatus/-BM x5 days, poor appetite. Reports she has been getting out of bed and walking around.       MEDICATIONS  (STANDING):  docusate sodium 100 milliGRAM(s) Oral two times a day  losartan 100 milliGRAM(s) Oral daily  polyethylene glycol 3350 17 Gram(s) Oral two times a day  senna 2 Tablet(s) Oral at bedtime  sodium chloride 0.9%. 1000 milliLiter(s) (60 mL/Hr) IV Continuous <Continuous>    MEDICATIONS  (PRN):  acetaminophen   Tablet .. 650 milliGRAM(s) Oral every 6 hours PRN Temp greater or equal to 38C (100.4F), Mild Pain (1 - 3)  oxyCODONE    5 mG/acetaminophen 325 mG 1 Tablet(s) Oral every 8 hours PRN Severe Pain (7 - 10)      OBJECTIVE:    Vital Signs Last 24 Hrs  T(C): 36.8 (20 Jul 2019 05:09), Max: 36.9 (20 Jul 2019 01:05)  T(F): 98.3 (20 Jul 2019 05:09), Max: 98.4 (20 Jul 2019 01:05)  HR: 76 (20 Jul 2019 05:09) (73 - 101)  BP: 134/78 (20 Jul 2019 05:09) (108/78 - 144/80)  BP(mean): --  RR: 18 (20 Jul 2019 05:09) (18 - 18)  SpO2: 96% (20 Jul 2019 05:09) (94% - 98%)    General Appearance: NAD, sitting up in bed  Neck: Supple  Chest: non-labored breathing, no respiratory distress  CV: Pulse regular presently  Abdomen: Soft, mildly tender to palpation in all quadrants, significant distention still present  Extremities: warm and well perfused    I&O's Summary    19 Jul 2019 07:01  -  20 Jul 2019 07:00  --------------------------------------------------------  IN: 1290 mL / OUT: 200 mL / NET: 1090 mL    20 Jul 2019 07:01  -  20 Jul 2019 10:36  --------------------------------------------------------  IN: 360 mL / OUT: 0 mL / NET: 360 mL      I&O's Detail    19 Jul 2019 07:01  -  20 Jul 2019 07:00  --------------------------------------------------------  IN:    Oral Fluid: 270 mL    sodium chloride 0.9%.: 1020 mL  Total IN: 1290 mL    OUT:    Voided: 200 mL  Total OUT: 200 mL    Total NET: 1090 mL      20 Jul 2019 07:01  -  20 Jul 2019 10:36  --------------------------------------------------------  IN:    Oral Fluid: 240 mL    sodium chloride 0.9%.: 120 mL  Total IN: 360 mL    OUT:  Total OUT: 0 mL    Total NET: 360 mL            LABS:                        12.9   6.7   )-----------( 551      ( 19 Jul 2019 18:27 )             39.4     07-20    138  |  99  |  10  ----------------------------<  102<H>  3.3<L>   |  25  |  0.77    Ca    9.0      20 Jul 2019 07:11  Phos  5.0     07-20  Mg     1.8     07-20    TPro  6.9  /  Alb  3.4  /  TBili  0.4  /  DBili  <0.1  /  AST  12  /  ALT  5<L>  /  AlkPhos  97  07-19    PT/INR - ( 19 Jul 2019 11:17 )   PT: 14.6 sec;   INR: 1.27 ratio         PTT - ( 19 Jul 2019 11:17 )  PTT:33.1 sec

## 2019-07-20 NOTE — PROGRESS NOTE ADULT - SUBJECTIVE AND OBJECTIVE BOX
Hospitalist- Valdez Le MD  Pager: [phone]  If no response or off-hours, page [phone]  -------------------------------------  Patient is a 64y old  Female who presents with a chief complaint of abd pain (20 Jul 2019 11:37)  Subjective: Pt reports slight recurrence of abd swelling today, but no pain, no fevers/chills. +BM x 5 today- all formed, nonwatery.    14 point ros otherwise negative.     VITAL SIGNS:  Vital Signs Last 24 Hrs  T(C): 36.4 (20 Jul 2019 14:17), Max: 36.9 (20 Jul 2019 01:05)  T(F): 97.6 (20 Jul 2019 14:17), Max: 98.4 (20 Jul 2019 01:05)  HR: 79 (20 Jul 2019 14:17) (76 - 90)  BP: 123/79 (20 Jul 2019 14:17) (123/78 - 144/80)  BP(mean): --  RR: 18 (20 Jul 2019 14:17) (18 - 18)  SpO2: 96% (20 Jul 2019 14:17) (96% - 98%)      PHYSICAL EXAM:     GENERAL: no acute distress  HEENT: PERRLA, EOMI, moist oropharynx   RESPIRATORY: CTAB, no w/c   CARDIOVASCULAR: RRR, no murmurs, gallops, rubs  ABDOMINAL: soft, non-tender, +moderately distended, positive bowel sounds   EXTREMITIES: no clubbing, cyanosis, or edema  NEUROLOGICAL: alert and oriented x 3, non-focal  SKIN: no rashes or lesions   MUSCULOSKELETAL: no gross joint deformity                          11.6   6.98  )-----------( 589      ( 20 Jul 2019 10:44 )             35.6     07-20    138  |  99  |  10  ----------------------------<  102<H>  3.3<L>   |  25  |  0.77    Ca    9.0      20 Jul 2019 07:11  Phos  5.0     07-20  Mg     1.8     07-20    TPro  6.9  /  Alb  3.4  /  TBili  0.4  /  DBili  <0.1  /  AST  12  /  ALT  5<L>  /  AlkPhos  97  07-19      CAPILLARY BLOOD GLUCOSE          MEDICATIONS  (STANDING):  docusate sodium 100 milliGRAM(s) Oral two times a day  enoxaparin Injectable 80 milliGRAM(s) SubCutaneous every 12 hours  losartan 100 milliGRAM(s) Oral daily  polyethylene glycol 3350 17 Gram(s) Oral two times a day  potassium chloride    Tablet ER 40 milliEquivalent(s) Oral every 4 hours  senna 2 Tablet(s) Oral at bedtime  sodium chloride 0.9%. 1000 milliLiter(s) (60 mL/Hr) IV Continuous <Continuous>    MEDICATIONS  (PRN):  acetaminophen   Tablet .. 650 milliGRAM(s) Oral every 6 hours PRN Temp greater or equal to 38C (100.4F), Mild Pain (1 - 3)  oxyCODONE    5 mG/acetaminophen 325 mG 1 Tablet(s) Oral every 8 hours PRN Severe Pain (7 - 10)

## 2019-07-20 NOTE — PROGRESS NOTE ADULT - PROBLEM SELECTOR PLAN 2
Recently dx with lobar segmental PE on Eliquis.   - switching to lovenox tonight due to IVC thrombus

## 2019-07-20 NOTE — PROGRESS NOTE ADULT - SUBJECTIVE AND OBJECTIVE BOX
Patient is a 64y old  Female who presents with a chief complaint of abd pain (2019 02:04)      HPI:  64F w/ PMH of PE dx 2019 (on eliquis), cerebral aneurysm s/p  shunt, HTN, HLD, metastatic GB carcinoma s/p lap cholecystectomy and partial liver ressection, on chemo (capecitabine?), last dose 5 weeks ago, now p/w abd pain & distention.     Patient reports intermittent diffuse abdominal pain with associated nausea and NBNB emesis x 3 episodes, as well as decreased appetite for the past week. Reports constipation last BM Tuesday. 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 Was dx with PE at that time as well and started on Eliquis. Reports never fulling recovered since discharge last time. BM were mostly small pellets of fecal matters. Otherwise denies fever, chill, CP, diarrhea, urinary concerns. Reports mild SOB, especially with exertion over the last week as well.     In ED, VSS. CT scan demonstrated increased volume ascites. No SBO but possible IVC clot. Vascular surgery c/s. s/p 1L bolus, Tylenol, morphine and Zofran.    At the time of my examination. Patient reports 2/10 abd pain, and no longer feeling nauseated. (2019 02:04)      the pt came into the hospital here after being admitted here last month. She has been treated with conventional chemotherapy that has failed and she is seeking second opinions. Over the last few days she has developed progressive increase in abdominal girth and on eliquis was found on ct of the abdomen to have a new IVC clot. She will require a tap but she needs to be off her eliquis for at least 24 hours. After the pt will need to be placed on sq lovenox as she appears to be a eliquis failure.  pt had a paracentesis done yesterday and the fluid removed was about 3000 cc. The fluid unfortunately is compartmentalized and she feels the fluid returned quickly in her abdomen overnight. This may be from IVC blockage and this afternoon the pt should be placed on lovenox 1.0 mg/kg sq twice a day q 12 hours as she appears to be refractory to the noac meds. The case was discussed in detail with the pt and the daughter at the bedside and the pt and the daughter will have to have teaching on lovenox injections before discharge. Pt may need another paracentesis also before going home as the abdomen is very swollen again this am.      MEDICATIONS  (STANDING):  hydrochlorothiazide 25 milliGRAM(s) Oral daily  losartan 100 milliGRAM(s) Oral daily    MEDICATIONS  (PRN):  acetaminophen   Tablet .. 650 milliGRAM(s) Oral every 6 hours PRN Temp greater or equal to 38C (100.4F), Mild Pain (1 - 3)  oxyCODONE    5 mG/acetaminophen 325 mG 1 Tablet(s) Oral every 8 hours PRN Severe Pain (7 - 10)    ICU Vital Signs Last 24 Hrs  T(C): 36.8 (2019 05:09), Max: 36.9 (2019 01:05)  T(F): 98.3 (2019 05:09), Max: 98.4 (2019 01:05)  HR: 76 (2019 05:09) (73 - 90)  BP: 134/78 (2019 05:09) (123/78 - 144/80)  BP(mean): --  ABP: --  ABP(mean): --  RR: 18 (2019 05:09) (18 - 18)  SpO2: 96% (2019 05:09) (94% - 98%)    LABS:                        11.6   6.98  )-----------( 589      ( 2019 10:44 )             35.6     07-18    134<L>  |  93<L>  |  8   ----------------------------<  110<H>  3.6   |  28  |  0.65    Ca    9.7      2019 14:17    TPro  7.2  /  Alb  3.7  /  TBili  0.4  /  DBili  x   /  AST  11  /  ALT  7<L>  /  AlkPhos  94  07-18    PT/INR - ( 2019 11:17 )   PT: 14.6 sec;   INR: 1.27 ratio         PTT - ( 2019 11:17 )  PTT:33.1 sec  Urinalysis Basic - ( 2019 12:34 )    Color: Light Yellow / Appearance: Clear / S.008 / pH: x  Gluc: x / Ketone: Negative  / Bili: Negative / Urobili: Negative   Blood: x / Protein: Negative / Nitrite: Negative   Leuk Esterase: Negative / RBC: 3 /hpf / WBC 2 /HPF   Sq Epi: x / Non Sq Epi: 0 /hpf / Bacteria: Negative        ROS:  Negative except for:    PAST MEDICAL & SURGICAL HISTORY:  Malignant neoplasm of gallbladder  History of osteoarthritis  Gallstones: dx: &#x27;   Hypercholesterolemia: Borderline. Diet-managed  Multiparity  Hypertension  Vitamin D deficiency  Cerebral aneurysm: &#x27; :  surgically treated  History of cholecystectomy:   S/P ventricular shunt placement: &#x27; 2009  S/P cerebral aneurysm operation: &#x27; :  Clipping of Cerebral Aneurysm      SOCIAL HISTORY:    FAMILY HISTORY:  CAD (coronary artery disease): Father      Allergies    No Known Allergies    Intolerances        PHYSICAL EXAM  General: adult in NAD  HEENT: clear oropharynx, anicteric sclera, pink conjunctivae  Neck: supple  CV: normal S1S2 with no murmur rubs or gallops  Lungs: decreased breath sounds at the bases bilaterally  Abdomen: soft non-tender massively distended from ascites. no hepatosplenomegaly  Ext: no clubbing cyanosis or =1  edema  Skin: no rashes and no petechiae  Neuro: alert and oriented X3 no focal deficits    BLOOD SMEAR INTERPRETATION:    RADIOLOGY :

## 2019-07-21 ENCOUNTER — TRANSCRIPTION ENCOUNTER (OUTPATIENT)
Age: 64
End: 2019-07-21

## 2019-07-21 LAB
ANION GAP SERPL CALC-SCNC: 12 MMOL/L — SIGNIFICANT CHANGE UP (ref 5–17)
BUN SERPL-MCNC: 8 MG/DL — SIGNIFICANT CHANGE UP (ref 7–23)
CALCIUM SERPL-MCNC: 8.7 MG/DL — SIGNIFICANT CHANGE UP (ref 8.4–10.5)
CHLORIDE SERPL-SCNC: 100 MMOL/L — SIGNIFICANT CHANGE UP (ref 96–108)
CO2 SERPL-SCNC: 24 MMOL/L — SIGNIFICANT CHANGE UP (ref 22–31)
CREAT SERPL-MCNC: 0.72 MG/DL — SIGNIFICANT CHANGE UP (ref 0.5–1.3)
GLUCOSE SERPL-MCNC: 95 MG/DL — SIGNIFICANT CHANGE UP (ref 70–99)
MAGNESIUM SERPL-MCNC: 1.7 MG/DL — SIGNIFICANT CHANGE UP (ref 1.6–2.6)
POTASSIUM SERPL-MCNC: 3.9 MMOL/L — SIGNIFICANT CHANGE UP (ref 3.5–5.3)
POTASSIUM SERPL-SCNC: 3.9 MMOL/L — SIGNIFICANT CHANGE UP (ref 3.5–5.3)
SODIUM SERPL-SCNC: 136 MMOL/L — SIGNIFICANT CHANGE UP (ref 135–145)

## 2019-07-21 PROCEDURE — 99233 SBSQ HOSP IP/OBS HIGH 50: CPT

## 2019-07-21 RX ORDER — SENNA PLUS 8.6 MG/1
2 TABLET ORAL
Qty: 0 | Refills: 0 | DISCHARGE
Start: 2019-07-21

## 2019-07-21 RX ORDER — ONDANSETRON 8 MG/1
4 TABLET, FILM COATED ORAL ONCE
Refills: 0 | Status: COMPLETED | OUTPATIENT
Start: 2019-07-21 | End: 2019-07-22

## 2019-07-21 RX ORDER — POLYETHYLENE GLYCOL 3350 17 G/17G
17 POWDER, FOR SOLUTION ORAL
Qty: 0 | Refills: 0 | DISCHARGE
Start: 2019-07-21

## 2019-07-21 RX ORDER — OXYCODONE HYDROCHLORIDE 5 MG/1
15 TABLET ORAL ONCE
Refills: 0 | Status: DISCONTINUED | OUTPATIENT
Start: 2019-07-21 | End: 2019-07-22

## 2019-07-21 RX ORDER — LOSARTAN POTASSIUM 100 MG/1
1 TABLET, FILM COATED ORAL
Qty: 0 | Refills: 0 | DISCHARGE
Start: 2019-07-21

## 2019-07-21 RX ORDER — ENOXAPARIN SODIUM 100 MG/ML
80 INJECTION SUBCUTANEOUS
Qty: 60 | Refills: 0
Start: 2019-07-21 | End: 2019-08-19

## 2019-07-21 RX ORDER — DOCUSATE SODIUM 100 MG
1 CAPSULE ORAL
Qty: 0 | Refills: 0 | DISCHARGE
Start: 2019-07-21

## 2019-07-21 RX ADMIN — OXYCODONE AND ACETAMINOPHEN 1 TABLET(S): 5; 325 TABLET ORAL at 17:12

## 2019-07-21 RX ADMIN — ONDANSETRON 4 MILLIGRAM(S): 8 TABLET, FILM COATED ORAL at 00:11

## 2019-07-21 RX ADMIN — POLYETHYLENE GLYCOL 3350 17 GRAM(S): 17 POWDER, FOR SOLUTION ORAL at 06:44

## 2019-07-21 RX ADMIN — POLYETHYLENE GLYCOL 3350 17 GRAM(S): 17 POWDER, FOR SOLUTION ORAL at 17:12

## 2019-07-21 RX ADMIN — OXYCODONE AND ACETAMINOPHEN 1 TABLET(S): 5; 325 TABLET ORAL at 01:54

## 2019-07-21 RX ADMIN — ENOXAPARIN SODIUM 80 MILLIGRAM(S): 100 INJECTION SUBCUTANEOUS at 06:43

## 2019-07-21 RX ADMIN — OXYCODONE AND ACETAMINOPHEN 1 TABLET(S): 5; 325 TABLET ORAL at 17:42

## 2019-07-21 RX ADMIN — LOSARTAN POTASSIUM 100 MILLIGRAM(S): 100 TABLET, FILM COATED ORAL at 06:43

## 2019-07-21 RX ADMIN — Medication 100 MILLIGRAM(S): at 06:43

## 2019-07-21 RX ADMIN — ENOXAPARIN SODIUM 80 MILLIGRAM(S): 100 INJECTION SUBCUTANEOUS at 17:12

## 2019-07-21 RX ADMIN — Medication 100 MILLIGRAM(S): at 17:12

## 2019-07-21 RX ADMIN — OXYCODONE AND ACETAMINOPHEN 1 TABLET(S): 5; 325 TABLET ORAL at 02:55

## 2019-07-21 NOTE — PHYSICAL THERAPY INITIAL EVALUATION ADULT - DISCHARGE DISPOSITION, PT EVAL
home w/ home PT/Home with home PT for safety assessment, gait, balance, & strength training and to return pt to baseline functional mobility status.

## 2019-07-21 NOTE — DISCHARGE NOTE NURSING/CASE MANAGEMENT/SOCIAL WORK - NSDCDPATPORTLINK_GEN_ALL_CORE
You can access the [facility], offered by [facility], by registering with the following website: [email or web address]

## 2019-07-21 NOTE — PROGRESS NOTE ADULT - SUBJECTIVE AND OBJECTIVE BOX
Patient is a 64y old  Female who presents with a chief complaint of abd pain (2019 02:04)      HPI:  64F w/ PMH of PE dx 2019 (on eliquis), cerebral aneurysm s/p  shunt, HTN, HLD, metastatic GB carcinoma s/p lap cholecystectomy and partial liver ressection, on chemo (capecitabine?), last dose 5 weeks ago, now p/w abd pain & distention.     Patient reports intermittent diffuse abdominal pain with associated nausea and NBNB emesis x 3 episodes, as well as decreased appetite for the past week. Reports constipation last BM Tuesday. 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 Was dx with PE at that time as well and started on Eliquis. Reports never fulling recovered since discharge last time. BM were mostly small pellets of fecal matters. Otherwise denies fever, chill, CP, diarrhea, urinary concerns. Reports mild SOB, especially with exertion over the last week as well.     In ED, VSS. CT scan demonstrated increased volume ascites. No SBO but possible IVC clot. Vascular surgery c/s. s/p 1L bolus, Tylenol, morphine and Zofran.    At the time of my examination. Patient reports 2/10 abd pain, and no longer feeling nauseated. (2019 02:04)      the pt came into the hospital here after being admitted here last month. She has been treated with conventional chemotherapy that has failed and she is seeking second opinions. Over the last few days she has developed progressive increase in abdominal girth and on eliquis was found on ct of the abdomen to have a new IVC clot. She will require a tap but she needs to be off her eliquis for at least 24 hours. After the pt will need to be placed on sq lovenox as she appears to be a eliquis failure.  pt had a paracentesis done yesterday and the fluid removed was about 3000 cc. The fluid unfortunately is compartmentalized and she feels the fluid returned quickly in her abdomen overnight. This may be from IVC blockage and this afternoon the pt should be placed on lovenox 1.0 mg/kg sq twice a day q 12 hours as she appears to be refractory to the noac meds. The case was discussed in detail with the pt and the daughter at the bedside and the pt and the daughter will have to have teaching on lovenox injections before discharge. Pt may need another paracentesis also before going home as the abdomen is very swollen again this am.   pt is stable and started the lovenox and tolerating it well, pt still feels a good deal of abdominal pressure.    MEDICATIONS  (STANDING):  hydrochlorothiazide 25 milliGRAM(s) Oral daily  losartan 100 milliGRAM(s) Oral daily    MEDICATIONS  (PRN):  acetaminophen   Tablet .. 650 milliGRAM(s) Oral every 6 hours PRN Temp greater or equal to 38C (100.4F), Mild Pain (1 - 3)  oxyCODONE    5 mG/acetaminophen 325 mG 1 Tablet(s) Oral every 8 hours PRN Severe Pain (7 - 10)  ICU Vital Signs Last 24 Hrs  T(C): 36.7 (2019 06:41), Max: 36.7 (2019 06:41)  T(F): 98.1 (2019 06:41), Max: 98.1 (2019 06:41)  HR: 79 (2019 10:47) (75 - 79)  BP: 129/86 (2019 10:47) (123/79 - 144/91)  BP(mean): --  ABP: --  ABP(mean): --  RR: 18 (2019 10:47) (18 - 18)  SpO2: 98% (2019 10:47) (96% - 98%)    07-18                        11.6   6.98  )-----------( 589      ( 2019 10:44 )             35.6     134<L>  |  93<L>  |  8   ----------------------------<  110<H>  3.6   |  28  |  0.65    Ca    9.7      2019 14:17    TPro  7.2  /  Alb  3.7  /  TBili  0.4  /  DBili  x   /  AST  11  /  ALT  7<L>  /  AlkPhos  94  07-18    PT/INR - ( 2019 11:17 )   PT: 14.6 sec;   INR: 1.27 ratio         PTT - ( 2019 11:17 )  PTT:33.1 sec  Urinalysis Basic - ( 2019 12:34 )    Color: Light Yellow / Appearance: Clear / S.008 / pH: x  Gluc: x / Ketone: Negative  / Bili: Negative / Urobili: Negative   Blood: x / Protein: Negative / Nitrite: Negative   Leuk Esterase: Negative / RBC: 3 /hpf / WBC 2 /HPF   Sq Epi: x / Non Sq Epi: 0 /hpf / Bacteria: Negative        ROS:  Negative except for:    PAST MEDICAL & SURGICAL HISTORY:  Malignant neoplasm of gallbladder  History of osteoarthritis  Gallstones: dx: &#x27;   Hypercholesterolemia: Borderline. Diet-managed  Multiparity  Hypertension  Vitamin D deficiency  Cerebral aneurysm: &#x27; :  surgically treated  History of cholecystectomy:   S/P ventricular shunt placement: &#x27;   S/P cerebral aneurysm operation: &#x27; :  Clipping of Cerebral Aneurysm      SOCIAL HISTORY:    FAMILY HISTORY:  CAD (coronary artery disease): Father      Allergies    No Known Allergies    Intolerances        PHYSICAL EXAM  General: adult in NAD  HEENT: clear oropharynx, anicteric sclera, pink conjunctivae  Neck: supple  CV: normal S1S2 with no murmur rubs or gallops  Lungs: decreased breath sounds at the bases bilaterally  Abdomen: soft non-tender massively distended from ascites. no hepatosplenomegaly  Ext: no clubbing cyanosis or =1  edema  Skin: no rashes and no petechiae  Neuro: alert and oriented X3 no focal deficits    BLOOD SMEAR INTERPRETATION:    RADIOLOGY :

## 2019-07-21 NOTE — PROGRESS NOTE ADULT - SUBJECTIVE AND OBJECTIVE BOX
Hospitalist- Valdez Le MD  Pager: [phone]  If no response or off-hours, page [phone]  -------------------------------------  Patient is a 64y old  Female who presents with a chief complaint of abd pain (22 Jul 2019 12:59)  Subjective        VITAL SIGNS:  98.1, 76, 135/85, 18, 98RA.      PHYSICAL EXAM:     GENERAL: no acute distress  HEENT: PERRLA, EOMI, moist oropharynx   RESPIRATORY: CTAB, no w/c   CARDIOVASCULAR: RRR, no murmurs, gallops, rubs  ABDOMINAL: soft, non-tender, +distended, positive bowel sounds   EXTREMITIES: no clubbing, cyanosis, +trace pitting edema  NEUROLOGICAL: alert and oriented x 3, non-focal  SKIN: no rashes or lesions   MUSCULOSKELETAL: no gross joint deformity      07-21    136  |  100  |  8   ----------------------------<  95  3.9   |  24  |  0.72    Ca    8.7      21 Jul 2019 07:23  Mg     1.7     07-21        CAPILLARY BLOOD GLUCOSE          MEDICATIONS  (STANDING):  chlorhexidine 2% Cloths 1 Application(s) Topical daily  docusate sodium 100 milliGRAM(s) Oral two times a day  enoxaparin Injectable 80 milliGRAM(s) SubCutaneous every 12 hours  losartan 100 milliGRAM(s) Oral daily  polyethylene glycol 3350 17 Gram(s) Oral two times a day  senna 2 Tablet(s) Oral at bedtime  sodium chloride 0.9%. 1000 milliLiter(s) (60 mL/Hr) IV Continuous <Continuous>    MEDICATIONS  (PRN):  acetaminophen   Tablet .. 650 milliGRAM(s) Oral every 6 hours PRN Temp greater or equal to 38C (100.4F), Mild Pain (1 - 3)  oxyCODONE    5 mG/acetaminophen 325 mG 1 Tablet(s) Oral every 8 hours PRN Severe Pain (7 - 10)

## 2019-07-21 NOTE — DISCHARGE NOTE PROVIDER - HOSPITAL COURSE
64yoF w/ PMHx significant for PE (dx June 2019, on Eliquis), cerebral aneurysm s/p  shunt, HTN, metastatic GB carcinoma s/p lap cholecystectomy and partial liver resection on chemo (Capecitabine?), last dose 5 weeks ago, now p/w abd pain and distention likely secondary to worsening ascites from malignancy + chronic constipation. 64yoF w/ PMHx significant for PE (dx June 2019, on Eliquis), cerebral aneurysm s/p  shunt, HTN, metastatic GB carcinoma s/p lap cholecystectomy and partial liver resection on chemo (Capecitabine?), last dose 5 weeks ago, now p/w abd pain and distention likely secondary to worsening ascites from malignancy + chronic constipation. Pt underwent paracentesis x3 with placement of abdominal pleurx cath; to be d/c home for drainage 3x a week; pthad been on elquis for PE but found to have IVC thrombus; started on lovenox but insurance did not cover so was bridged to coumadin 64yoF w/ PMHx significant for PE (dx June 2019, on Eliquis), cerebral aneurysm s/p  shunt, HTN, metastatic GB carcinoma s/p lap cholecystectomy and partial liver resection on chemo (Capecitabine?), last dose 5 weeks ago, now p/w abd pain and distention likely secondary to worsening ascites from malignancy + chronic constipation. Pt underwent paracentesis x3 with placement of abdominal pleurx cath; to be d/c home for drainage 3x a week; pthad been on elquis for PE but found to have IVC thrombus; started on lovenox but insurance did not cover so was bridged to coumadin; pt had considered going home with hospice but has declined fornow

## 2019-07-21 NOTE — DISCHARGE NOTE PROVIDER - CARE PROVIDERS DIRECT ADDRESSES
,DirectAddress_Unknown ,DirectAddress_Unknown,[email or web address]

## 2019-07-21 NOTE — DISCHARGE NOTE PROVIDER - NSDCFUADDAPPT_GEN_ALL_CORE_FT
home blood draw tomorrow to monitor pt/inr  results to Dr Marko Barrios home blood draw monday to monitor pt/inr  results to Dr Marko Barrios

## 2019-07-21 NOTE — DISCHARGE NOTE PROVIDER - CARE PROVIDER_API CALL
Edgar Hurtado  Phone: [phone]  Fax: (   )    -  Follow Up Time: Edgar Hurtado  Phone: [phone]  Fax: (   )    -  Follow Up Time:     Jacinto Barton)  Cardiology; Internal Medicine  [address] [facility], [address]  [place] [address]  Phone: [phone]  Fax: [phone]  Follow Up Time:

## 2019-07-21 NOTE — PHYSICAL THERAPY INITIAL EVALUATION ADULT - PERTINENT HX OF CURRENT PROBLEM, REHAB EVAL
64F w/ PMH of PE dx June 2019 (on eliquis), cerebral aneurysm s/p  shunt, HTN, HLD, metastatic GB carcinoma s/p lap cholecystectomy and partial liver resection, on chemo (capecitabine?), last dose 5 weeks ago, now p/w abd pain & distention.

## 2019-07-21 NOTE — DISCHARGE NOTE PROVIDER - PROVIDER TOKENS
FREE:[LAST:[Marko Ying],FIRST:[Edgar],PHONE:[phone]],FAX:[(   )    -]] FREE:[LAST:[Marko Ying],FIRST:[Edgar],PHONE:[phone]],FAX:[(   )    -]],PROVIDER:[TOKEN:[[id number]]]

## 2019-07-21 NOTE — CHART NOTE - NSCHARTNOTEFT_GEN_A_CORE
MEDICINE NP NOTE  Spectra [id number]      Prescription for Lovenox sent to patient's preferred pharmacy.  Cost is $485 for a months supply.  Cost discussed with patient who states that she cannot afford it.  Will discuss with Dr Le.

## 2019-07-21 NOTE — PROGRESS NOTE ADULT - ASSESSMENT
7/19 the pt came into the hospital here after being admitted here last month. She has been treated with conventional chemotherapy that has failed and she is seeking second opinions. Over the last few days she has developed progressive increase in abdominal girth and on Eliquis was found on ct of the abdomen to have a new IVC clot. She will require a tap but she needs to be off her Eliquis for at least 24 hours. After the pt will need to be placed on sq Lovenox as she appears to be a Eliquis failure.    7/19 the pt came into the hospital here after being admitted here last month. She has been treated with conventional chemotherapy that has failed and she is seeking second opinions. Over the last few days she has developed progressive increase in abdominal girth and on Eliquis was found on ct of the abdomen to have a new IVC clot. She will require a tap but she needs to be off her eliquis for at least 24 hours. After the pt will need to be placed on sq Lovenox as she appears to be a Eliquis failure. 7/20 pt had a paracentesis done yesterday and the fluid removed was about 3000 cc. The fluid unfortunately is compartmentalized and she feels the fluid returned quickly in her abdomen overnight. This may be from IVC blockage and this afternoon the pt should be placed on Lovenox 1.0 mg/kg sq twice a day q 12 hours as she appears to be refractory to the noac meds. The case was discussed in detail with the pt and the daughter at the bedside and the pt and the daughter will have to have teaching on Lovenox injections before discharge. Pt may need another paracentesis also before going home as the abdomen is very swollen again this am.  7/21 pt is stable and started the lovenox and tolerating it well, pt still feels a good deal of abdominal pressure.

## 2019-07-21 NOTE — PHYSICAL THERAPY INITIAL EVALUATION ADULT - PLANNED THERAPY INTERVENTIONS, PT EVAL
transfer training/gait training/strengthening/balance training/GOAL: Pt will negotiate 5 steps  up and down using HR support, independent  within 2-3 weeks.

## 2019-07-21 NOTE — PHYSICAL THERAPY INITIAL EVALUATION ADULT - ADDITIONAL COMMENTS
Pt. lives in apt. alone, 14 steps to get in. No HHA services.  Patient ambulated without AD independent.

## 2019-07-21 NOTE — PROGRESS NOTE ADULT - PROBLEM SELECTOR PLAN 8
- DVT: starting full dose lovenox tonight  - Diet: regular  - dispo: likely home pending lovenox bridge to coumadin

## 2019-07-21 NOTE — DISCHARGE NOTE PROVIDER - NSDCCPCAREPLAN_GEN_ALL_CORE_FT
PRINCIPAL DISCHARGE DIAGNOSIS  Diagnosis: Ascites  Assessment and Plan of Treatment:       SECONDARY DISCHARGE DIAGNOSES  Diagnosis: Hydroureteronephrosis  Assessment and Plan of Treatment:     Diagnosis: Hypertension  Assessment and Plan of Treatment: Low salt diet  Activity as tolerated.  Take all medication as prescribed.  Follow up with your medical doctor for routine blood pressure monitoring at your next visit.  Notify your doctor if you have any of the following symptoms:   Dizziness, Lightheadedness, Blurry vision, Headache, Chest pain, Shortness of breath      Diagnosis: Gallbladder neoplasm  Assessment and Plan of Treatment:     Diagnosis: IVC thrombosis  Assessment and Plan of Treatment:     Diagnosis: Pulmonary embolism  Assessment and Plan of Treatment: PRINCIPAL DISCHARGE DIAGNOSIS  Diagnosis: Ascites  Assessment and Plan of Treatment: s/p paracentesis x3 with placement of pleurx catheter      SECONDARY DISCHARGE DIAGNOSES  Diagnosis: Hydroureteronephrosis  Assessment and Plan of Treatment:     Diagnosis: Hypertension  Assessment and Plan of Treatment: Low salt diet  Activity as tolerated.  Take all medication as prescribed.  Follow up with your medical doctor for routine blood pressure monitoring at your next visit.  Notify your doctor if you have any of the following symptoms:   Dizziness, Lightheadedness, Blurry vision, Headache, Chest pain, Shortness of breath      Diagnosis: IVC thrombosis  Assessment and Plan of Treatment: take coumadin as prescribed; monitor pt/inr    Diagnosis: Gallbladder neoplasm  Assessment and Plan of Treatment: f/u with Hospice    Diagnosis: Pulmonary embolism  Assessment and Plan of Treatment: take coumadin as prescribed; monitor pt/inr PRINCIPAL DISCHARGE DIAGNOSIS  Diagnosis: Ascites  Assessment and Plan of Treatment: s/p paracentesis x3 with placement of pleurx catheter      SECONDARY DISCHARGE DIAGNOSES  Diagnosis: Hydroureteronephrosis  Assessment and Plan of Treatment: creatinine monitored - stable    Diagnosis: Hypertension  Assessment and Plan of Treatment: Low salt diet  Activity as tolerated.  Take all medication as prescribed.  Follow up with your medical doctor for routine blood pressure monitoring at your next visit.  Notify your doctor if you have any of the following symptoms:   Dizziness, Lightheadedness, Blurry vision, Headache, Chest pain, Shortness of breath      Diagnosis: IVC thrombosis  Assessment and Plan of Treatment: take coumadin as prescribed; monitor pt/inr    Diagnosis: Gallbladder neoplasm  Assessment and Plan of Treatment: f/u with Hospice    Diagnosis: Pulmonary embolism  Assessment and Plan of Treatment: take coumadin as prescribed; monitor pt/inr

## 2019-07-21 NOTE — PROGRESS NOTE ADULT - ASSESSMENT
64F w/ PMH of PE (on eliquis) cerebral aneurysm, s/p  shunt, HTN, HLD, s/p lap cholecystectomy, GB adenocarcinoma s/p liver rxn (2/2017, Dr. Cartagena/[place]) w/ mets to the abd, now p/w LLQ abdominal pain and ascites, s/p 3.7L Paracentesis w/ IR. CT scan demonstrating peritoneal carcinomatosis, no SBO.       - follow up paracentesis cytology  - can consider repeat paracentesis given patient continues to be distended  - continue bowel regimen  - continue pain control  - rest of management per primary team  - surgery will continue to follow    [id number]

## 2019-07-21 NOTE — PROGRESS NOTE ADULT - SUBJECTIVE AND OBJECTIVE BOX
SUBJECTIVE: Pt s/p 3.7 L paracentesis by IR. This AM reports tolerating small amounts of food,, +flatus/+BM. Says she is having some intermittent nausea      MEDICATIONS  (STANDING):  docusate sodium 100 milliGRAM(s) Oral two times a day  losartan 100 milliGRAM(s) Oral daily  polyethylene glycol 3350 17 Gram(s) Oral two times a day  senna 2 Tablet(s) Oral at bedtime  sodium chloride 0.9%. 1000 milliLiter(s) (60 mL/Hr) IV Continuous <Continuous>    MEDICATIONS  (PRN):  acetaminophen   Tablet .. 650 milliGRAM(s) Oral every 6 hours PRN Temp greater or equal to 38C (100.4F), Mild Pain (1 - 3)  oxyCODONE    5 mG/acetaminophen 325 mG 1 Tablet(s) Oral every 8 hours PRN Severe Pain (7 - 10)      OBJECTIVE:    Vital Signs Last 24 Hrs  T(C): 36.7 (21 Jul 2019 06:41), Max: 36.7 (21 Jul 2019 06:41)  T(F): 98.1 (21 Jul 2019 06:41), Max: 98.1 (21 Jul 2019 06:41)  HR: 76 (21 Jul 2019 06:41) (75 - 79)  BP: 135/85 (21 Jul 2019 06:41) (123/79 - 144/91)  BP(mean): --  RR: 18 (21 Jul 2019 06:41) (18 - 18)  SpO2: 96% (21 Jul 2019 06:41) (96% - 97%)    General Appearance: NAD, sitting up in bed  Neck: Supple  Chest: non-labored breathing, no respiratory distress  CV: Pulse regular presently  Abdomen: Soft, mildly tender to palpation, significant distention still present  Extremities: warm and well perfused  I&O's Summary    20 Jul 2019 07:01  -  21 Jul 2019 07:00  --------------------------------------------------------  IN: 840 mL / OUT: 0 mL / NET: 840 mL          LABS:             07-21    136  |  100  |  8   ----------------------------<  95  3.9   |  24  |  0.72    Ca    8.7      21 Jul 2019 07:23  Phos  5.0     07-20  Mg     1.7     07-21    TPro  6.9  /  Alb  3.4  /  TBili  0.4  /  DBili  <0.1  /  AST  12  /  ALT  5<L>  /  AlkPhos  97  07-19                            11.6   6.98  )-----------( 589      ( 20 Jul 2019 10:44 )             35.6

## 2019-07-22 LAB
COMMENT - FLUIDS: SIGNIFICANT CHANGE UP
INR BLD: 1.19 RATIO — HIGH (ref 0.88–1.16)
NON-GYNECOLOGICAL CYTOLOGY STUDY: SIGNIFICANT CHANGE UP
PROTHROM AB SERPL-ACNC: 13.7 SEC — HIGH (ref 10–12.9)

## 2019-07-22 PROCEDURE — 99232 SBSQ HOSP IP/OBS MODERATE 35: CPT

## 2019-07-22 PROCEDURE — 99233 SBSQ HOSP IP/OBS HIGH 50: CPT

## 2019-07-22 RX ORDER — CHLORHEXIDINE GLUCONATE 213 G/1000ML
1 SOLUTION TOPICAL DAILY
Refills: 0 | Status: DISCONTINUED | OUTPATIENT
Start: 2019-07-22 | End: 2019-08-01

## 2019-07-22 RX ORDER — WARFARIN SODIUM 2.5 MG/1
5 TABLET ORAL ONCE
Refills: 0 | Status: COMPLETED | OUTPATIENT
Start: 2019-07-22 | End: 2019-07-22

## 2019-07-22 RX ORDER — ONDANSETRON 8 MG/1
4 TABLET, FILM COATED ORAL ONCE
Refills: 0 | Status: COMPLETED | OUTPATIENT
Start: 2019-07-22 | End: 2019-07-22

## 2019-07-22 RX ADMIN — OXYCODONE AND ACETAMINOPHEN 1 TABLET(S): 5; 325 TABLET ORAL at 21:12

## 2019-07-22 RX ADMIN — POLYETHYLENE GLYCOL 3350 17 GRAM(S): 17 POWDER, FOR SOLUTION ORAL at 06:05

## 2019-07-22 RX ADMIN — OXYCODONE AND ACETAMINOPHEN 1 TABLET(S): 5; 325 TABLET ORAL at 08:53

## 2019-07-22 RX ADMIN — LOSARTAN POTASSIUM 100 MILLIGRAM(S): 100 TABLET, FILM COATED ORAL at 06:05

## 2019-07-22 RX ADMIN — Medication 100 MILLIGRAM(S): at 06:05

## 2019-07-22 RX ADMIN — OXYCODONE AND ACETAMINOPHEN 1 TABLET(S): 5; 325 TABLET ORAL at 00:57

## 2019-07-22 RX ADMIN — OXYCODONE AND ACETAMINOPHEN 1 TABLET(S): 5; 325 TABLET ORAL at 01:30

## 2019-07-22 RX ADMIN — ENOXAPARIN SODIUM 80 MILLIGRAM(S): 100 INJECTION SUBCUTANEOUS at 17:11

## 2019-07-22 RX ADMIN — OXYCODONE AND ACETAMINOPHEN 1 TABLET(S): 5; 325 TABLET ORAL at 09:23

## 2019-07-22 RX ADMIN — OXYCODONE AND ACETAMINOPHEN 1 TABLET(S): 5; 325 TABLET ORAL at 22:10

## 2019-07-22 RX ADMIN — SENNA PLUS 2 TABLET(S): 8.6 TABLET ORAL at 21:14

## 2019-07-22 RX ADMIN — ENOXAPARIN SODIUM 80 MILLIGRAM(S): 100 INJECTION SUBCUTANEOUS at 06:06

## 2019-07-22 RX ADMIN — WARFARIN SODIUM 5 MILLIGRAM(S): 2.5 TABLET ORAL at 21:14

## 2019-07-22 RX ADMIN — CHLORHEXIDINE GLUCONATE 1 APPLICATION(S): 213 SOLUTION TOPICAL at 12:41

## 2019-07-22 RX ADMIN — ONDANSETRON 4 MILLIGRAM(S): 8 TABLET, FILM COATED ORAL at 00:41

## 2019-07-22 NOTE — PROGRESS NOTE ADULT - SUBJECTIVE AND OBJECTIVE BOX
Patient is a 64y old  Female who presents with a chief complaint of abd pain (2019 02:04)      HPI:  64F w/ PMH of PE dx 2019 (on eliquis), cerebral aneurysm s/p  shunt, HTN, HLD, metastatic GB carcinoma s/p lap cholecystectomy and partial liver ressection, on chemo (capecitabine?), last dose 5 weeks ago, now p/w abd pain & distention.     Patient reports intermittent diffuse abdominal pain with associated nausea and NBNB emesis x 3 episodes, as well as decreased appetite for the past week. Reports constipation last BM Tuesday. 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 Was dx with PE at that time as well and started on Eliquis. Reports never fulling recovered since discharge last time. BM were mostly small pellets of fecal matters. Otherwise denies fever, chill, CP, diarrhea, urinary concerns. Reports mild SOB, especially with exertion over the last week as well.     In ED, VSS. CT scan demonstrated increased volume ascites. No SBO but possible IVC clot. Vascular surgery c/s. s/p 1L bolus, Tylenol, morphine and Zofran.    At the time of my examination. Patient reports 2/10 abd pain, and no longer feeling nauseated. (2019 02:04)      the pt came into the hospital here after being admitted here last month. She has been treated with conventional chemotherapy that has failed and she is seeking second opinions. Over the last few days she has developed progressive increase in abdominal girth and on eliquis was found on ct of the abdomen to have a new IVC clot. She will require a tap but she needs to be off her eliquis for at least 24 hours. After the pt will need to be placed on sq lovenox as she appears to be a eliquis failure.  pt had a paracentesis done yesterday and the fluid removed was about 3000 cc. The fluid unfortunately is compartmentalized and she feels the fluid returned quickly in her abdomen overnight. This may be from IVC blockage and this afternoon the pt should be placed on lovenox 1.0 mg/kg sq twice a day q 12 hours as she appears to be refractory to the noac meds. The case was discussed in detail with the pt and the daughter at the bedside and the pt and the daughter will have to have teaching on lovenox injections before discharge. Pt may need another paracentesis also before going home as the abdomen is very swollen again this am.   pt is stable and started the Lovenox and tolerating it well, pt still feels a good deal of abdominal pressure.  the pt was seen from 1130 am to 1215 pm and while I was in the room the team notified me that the pt did not have adequate insurance for Lovenox as the Eliquis was not effective here. She developed a ivc clot on the med. She cannot afford the Lovenox and therefore we will try coumadin and have to keep the pt her till the inr is therapeutic. The case was discussed with the daughter by phone, social work the PA and with the hospitalist. Pt is not happy and it took lengthy explanations to get her to understand the above.    MEDICATIONS  (STANDING):  hydrochlorothiazide 25 milliGRAM(s) Oral daily  losartan 100 milliGRAM(s) Oral daily    MEDICATIONS  (PRN):  acetaminophen   Tablet .. 650 milliGRAM(s) Oral every 6 hours PRN Temp greater or equal to 38C (100.4F), Mild Pain (1 - 3)  oxyCODONE    5 mG/acetaminophen 325 mG 1 Tablet(s) Oral every 8 hours PRN Severe Pain (7 - 10)  ICU Vital Signs Last 24 Hrs  T(C): 36.6 (2019 05:04), Max: 36.6 (2019 05:04)  T(F): 97.9 (2019 05:04), Max: 97.9 (2019 05:04)  HR: 85 (2019 05:04) (85 - 99)  BP: 122/79 (2019 05:04) (121/81 - 137/88)  BP(mean): --  ABP: --  ABP(mean): --  RR: 18 (2019 05:04) (18 - 18)  SpO2: 97% (2019 05:04) (96% - 98%)    134<L>  |  93<L>  |  8   ----------------------------<  110<H>  3.6   |  28  |  0.65    Ca    9.7      2019 14:17    TPro  7.2  /  Alb  3.7  /  TBili  0.4  /  DBili  x   /  AST  11  /  ALT  7<L>  /  AlkPhos  94  07-18    PT/INR - ( 2019 11:17 )   PT: 14.6 sec;   INR: 1.27 ratio         PTT - ( 2019 11:17 )  PTT:33.1 sec  Urinalysis Basic - ( 2019 12:34 )    Color: Light Yellow / Appearance: Clear / S.008 / pH: x  Gluc: x / Ketone: Negative  / Bili: Negative / Urobili: Negative   Blood: x / Protein: Negative / Nitrite: Negative   Leuk Esterase: Negative / RBC: 3 /hpf / WBC 2 /HPF   Sq Epi: x / Non Sq Epi: 0 /hpf / Bacteria: Negative        ROS:  Negative except for:    PAST MEDICAL & SURGICAL HISTORY:  Malignant neoplasm of gallbladder  History of osteoarthritis  Gallstones: dx: &#x27;   Hypercholesterolemia: Borderline. Diet-managed  Multiparity  Hypertension  Vitamin D deficiency  Cerebral aneurysm: &#x27; :  surgically treated  History of cholecystectomy:   S/P ventricular shunt placement: &#x27;   S/P cerebral aneurysm operation: &#x27; :  Clipping of Cerebral Aneurysm      SOCIAL HISTORY:    FAMILY HISTORY:  CAD (coronary artery disease): Father      Allergies    No Known Allergies    Intolerances        PHYSICAL EXAM  General: adult in NAD  HEENT: clear oropharynx, anicteric sclera, pink conjunctivae  Neck: supple  CV: normal S1S2 with no murmur rubs or gallops  Lungs: decreased breath sounds at the bases bilaterally  Abdomen: soft non-tender massively distended from ascites. no hepatosplenomegaly  Ext: no clubbing cyanosis +1  edema  Skin: no rashes and no petechiae  Neuro: alert and oriented X3 no focal deficits    BLOOD SMEAR INTERPRETATION:    RADIOLOGY :

## 2019-07-22 NOTE — PROGRESS NOTE ADULT - SUBJECTIVE AND OBJECTIVE BOX
[facility] SURGERY DAILY PROGRESS NOTE:       SUBJECTIVE/ROS: Patient feels well- reports flatus and bowel movements with miralax and dulcolax suppository- abdominal  distention better after paracentesis- Denies nausea, vomiting, chest pain, shortness of breath         MEDICATIONS  (STANDING):  docusate sodium 100 milliGRAM(s) Oral two times a day  enoxaparin Injectable 80 milliGRAM(s) SubCutaneous every 12 hours  losartan 100 milliGRAM(s) Oral daily  polyethylene glycol 3350 17 Gram(s) Oral two times a day  senna 2 Tablet(s) Oral at bedtime  sodium chloride 0.9%. 1000 milliLiter(s) (60 mL/Hr) IV Continuous <Continuous>    MEDICATIONS  (PRN):  acetaminophen   Tablet .. 650 milliGRAM(s) Oral every 6 hours PRN Temp greater or equal to 38C (100.4F), Mild Pain (1 - 3)  oxyCODONE    5 mG/acetaminophen 325 mG 1 Tablet(s) Oral every 8 hours PRN Severe Pain (7 - 10)      OBJECTIVE:    Vital Signs Last 24 Hrs  T(C): 36.6 (22 Jul 2019 05:04), Max: 36.6 (22 Jul 2019 05:04)  T(F): 97.9 (22 Jul 2019 05:04), Max: 97.9 (22 Jul 2019 05:04)  HR: 85 (22 Jul 2019 05:04) (79 - 99)  BP: 122/79 (22 Jul 2019 05:04) (121/81 - 137/88)  BP(mean): --  RR: 18 (22 Jul 2019 05:04) (18 - 18)  SpO2: 97% (22 Jul 2019 05:04) (96% - 98%)        I&O's Detail    21 Jul 2019 07:01  -  22 Jul 2019 07:00  --------------------------------------------------------  IN:    Oral Fluid: 1200 mL  Total IN: 1200 mL    OUT:  Total OUT: 0 mL    Total NET: 1200 mL          Daily     Daily     LABS:                        11.6   6.98  )-----------( 589      ( 20 Jul 2019 10:44 )             35.6     07-21    136  |  100  |  8   ----------------------------<  95  3.9   |  24  |  0.72    Ca    8.7      21 Jul 2019 07:23  Mg     1.7     07-21          Physical Exam:   General Appearance: NAD, sitting up in bed  Neck: Supple  Chest: non-labored breathing, no respiratory distress  CV: Pulse regular presently  Abdomen: Soft, mildly tender to palpation, distention improved   Extremities: warm and well perfused

## 2019-07-22 NOTE — PROGRESS NOTE ADULT - ASSESSMENT
64F w/ PMH of PE (on eliquis) cerebral aneurysm, s/p  shunt, HTN, HLD, s/p lap cholecystectomy, GB adenocarcinoma s/p liver rxn (2/2017, Dr. Cartagena/[facility]) w/ mets to the abd, now p/w LLQ abdominal pain and ascites, s/p 3.7L Paracentesis w/ IR. CT scan demonstrating peritoneal carcinomatosis, no SBO.     - follow up paracentesis cytology  - continue bowel regimen  - continue pain control  - rest of management per primary team  - surgery will continue to follow    Breanna Alcantar PA-C [id number] 64F w/ PMH of PE (on eliquis) cerebral aneurysm, s/p  shunt, HTN, HLD, s/p lap cholecystectomy, GB adenocarcinoma s/p liver rxn (2/2017, Dr. Cartagena/[facility]) w/ mets to the abd, now p/w LLQ abdominal pain and ascites, s/p 3.7L Paracentesis w/ IR. CT scan demonstrating peritoneal carcinomatosis, no SBO.     - follow up paracentesis cytology  - continue bowel regimen  - continue pain control  - rest of management per primary team  - will sign off at this time, please contact for any further questions    Breanna Alcantar PA-C [id number]

## 2019-07-22 NOTE — PROGRESS NOTE ADULT - SUBJECTIVE AND OBJECTIVE BOX
Hospitalist- Valdez Le MD  Pager: [phone]  If no response or off-hours, page [phone]  -------------------------------------  Patient is a 64y old  Female who presents with a chief complaint of abd pain (22 Jul 2019 12:43)  Subjective: No acute events overnight. Pt unable to afford lovenox for AC, will be amenable to coumadin. Abd pain controlled, no sob/cp, no fevers/chills.    14 point ros otherwise negative.     VITAL SIGNS:  Vital Signs Last 24 Hrs  T(C): 36.6 (22 Jul 2019 05:04), Max: 36.6 (22 Jul 2019 05:04)  T(F): 97.9 (22 Jul 2019 05:04), Max: 97.9 (22 Jul 2019 05:04)  HR: 85 (22 Jul 2019 05:04) (85 - 99)  BP: 122/79 (22 Jul 2019 05:04) (121/81 - 137/88)  BP(mean): --  RR: 18 (22 Jul 2019 05:04) (18 - 18)  SpO2: 97% (22 Jul 2019 05:04) (96% - 98%)      PHYSICAL EXAM:     GENERAL: no acute distress  HEENT: PERRLA, EOMI, moist oropharynx   RESPIRATORY: CTAB, no w/c   CARDIOVASCULAR: RRR, no murmurs, gallops, rubs  ABDOMINAL: soft, non-tender, +distended, positive bowel sounds   EXTREMITIES: no clubbing, cyanosis, +trace pitting edema  NEUROLOGICAL: alert and oriented x 3, non-focal  SKIN: no rashes or lesions   MUSCULOSKELETAL: no gross joint deformity      07-21    136  |  100  |  8   ----------------------------<  95  3.9   |  24  |  0.72    Ca    8.7      21 Jul 2019 07:23  Mg     1.7     07-21        CAPILLARY BLOOD GLUCOSE          MEDICATIONS  (STANDING):  chlorhexidine 2% Cloths 1 Application(s) Topical daily  docusate sodium 100 milliGRAM(s) Oral two times a day  enoxaparin Injectable 80 milliGRAM(s) SubCutaneous every 12 hours  losartan 100 milliGRAM(s) Oral daily  polyethylene glycol 3350 17 Gram(s) Oral two times a day  senna 2 Tablet(s) Oral at bedtime  sodium chloride 0.9%. 1000 milliLiter(s) (60 mL/Hr) IV Continuous <Continuous>    MEDICATIONS  (PRN):  acetaminophen   Tablet .. 650 milliGRAM(s) Oral every 6 hours PRN Temp greater or equal to 38C (100.4F), Mild Pain (1 - 3)  oxyCODONE    5 mG/acetaminophen 325 mG 1 Tablet(s) Oral every 8 hours PRN Severe Pain (7 - 10)

## 2019-07-22 NOTE — PROGRESS NOTE ADULT - ASSESSMENT
7/19 the pt came into the hospital here after being admitted here last month. She has been treated with conventional chemotherapy that has failed and she is seeking second opinions. Over the last few days she has developed progressive increase in abdominal girth and on Eliquis was found on ct of the abdomen to have a new IVC clot. She will require a tap but she needs to be off her Eliquis for at least 24 hours. After the pt will need to be placed on sq Lovenox as she appears to be a Eliquis failure.    7/19 the pt came into the hospital here after being admitted here last month. She has been treated with conventional chemotherapy that has failed and she is seeking second opinions. Over the last few days she has developed progressive increase in abdominal girth and on Eliquis was found on ct of the abdomen to have a new IVC clot. She will require a tap but she needs to be off her eliquis for at least 24 hours. After the pt will need to be placed on sq Lovenox as she appears to be a Eliquis failure. 7/20 pt had a paracentesis done yesterday and the fluid removed was about 3000 cc. The fluid unfortunately is compartmentalized and she feels the fluid returned quickly in her abdomen overnight. This may be from IVC blockage and this afternoon the pt should be placed on Lovenox 1.0 mg/kg sq twice a day q 12 hours as she appears to be refractory to the noac meds. The case was discussed in detail with the pt and the daughter at the bedside and the pt and the daughter will have to have teaching on Lovenox injections before discharge. Pt may need another paracentesis also before going home as the abdomen is very swollen again this am.  7/21 pt is stable and started the lovenox and tolerating it well, pt still feels a good deal of abdominal pressure.   7/22 the pt was seen from 1130 am to 1215 pm and while I was in the room the team notified me that the pt did not have adequate insurance for Lovenox as the Eliquis was not effective here. She developed a ivc clot on the med. She cannot afford the Lovenox and therefore we will try coumadin and have to keep the pt her till the inr is therapeutic. The case was discussed with the daughter by phone, social work the PA and with the hospitalist. Pt is not happy and it took lengthy explanations to get her to understand the above.

## 2019-07-22 NOTE — PROGRESS NOTE ADULT - PROBLEM SELECTOR PLAN 2
Recently dx with lobar segmental PE on Eliquis.   - initiate lovenox bridge to coumadin tonight, goal inr 2-3

## 2019-07-23 LAB
INR BLD: 1.27 RATIO — HIGH (ref 0.88–1.16)
PROTHROM AB SERPL-ACNC: 14.5 SEC — HIGH (ref 10–13.1)

## 2019-07-23 PROCEDURE — 99233 SBSQ HOSP IP/OBS HIGH 50: CPT

## 2019-07-23 RX ORDER — ONDANSETRON 8 MG/1
4 TABLET, FILM COATED ORAL EVERY 6 HOURS
Refills: 0 | Status: DISCONTINUED | OUTPATIENT
Start: 2019-07-23 | End: 2019-08-01

## 2019-07-23 RX ORDER — WARFARIN SODIUM 2.5 MG/1
5 TABLET ORAL ONCE
Refills: 0 | Status: COMPLETED | OUTPATIENT
Start: 2019-07-23 | End: 2019-07-23

## 2019-07-23 RX ORDER — WARFARIN SODIUM 2.5 MG/1
7.5 TABLET ORAL ONCE
Refills: 0 | Status: DISCONTINUED | OUTPATIENT
Start: 2019-07-23 | End: 2019-07-23

## 2019-07-23 RX ADMIN — SENNA PLUS 2 TABLET(S): 8.6 TABLET ORAL at 21:02

## 2019-07-23 RX ADMIN — WARFARIN SODIUM 5 MILLIGRAM(S): 2.5 TABLET ORAL at 21:02

## 2019-07-23 RX ADMIN — POLYETHYLENE GLYCOL 3350 17 GRAM(S): 17 POWDER, FOR SOLUTION ORAL at 17:28

## 2019-07-23 RX ADMIN — OXYCODONE AND ACETAMINOPHEN 1 TABLET(S): 5; 325 TABLET ORAL at 17:28

## 2019-07-23 RX ADMIN — ENOXAPARIN SODIUM 80 MILLIGRAM(S): 100 INJECTION SUBCUTANEOUS at 06:15

## 2019-07-23 RX ADMIN — POLYETHYLENE GLYCOL 3350 17 GRAM(S): 17 POWDER, FOR SOLUTION ORAL at 06:16

## 2019-07-23 RX ADMIN — ONDANSETRON 4 MILLIGRAM(S): 8 TABLET, FILM COATED ORAL at 14:08

## 2019-07-23 RX ADMIN — Medication 100 MILLIGRAM(S): at 17:28

## 2019-07-23 RX ADMIN — CHLORHEXIDINE GLUCONATE 1 APPLICATION(S): 213 SOLUTION TOPICAL at 12:10

## 2019-07-23 RX ADMIN — ONDANSETRON 4 MILLIGRAM(S): 8 TABLET, FILM COATED ORAL at 00:10

## 2019-07-23 RX ADMIN — ENOXAPARIN SODIUM 80 MILLIGRAM(S): 100 INJECTION SUBCUTANEOUS at 17:22

## 2019-07-23 RX ADMIN — OXYCODONE AND ACETAMINOPHEN 1 TABLET(S): 5; 325 TABLET ORAL at 18:15

## 2019-07-23 RX ADMIN — Medication 100 MILLIGRAM(S): at 06:16

## 2019-07-23 RX ADMIN — LOSARTAN POTASSIUM 100 MILLIGRAM(S): 100 TABLET, FILM COATED ORAL at 06:16

## 2019-07-23 NOTE — PROGRESS NOTE ADULT - PROBLEM SELECTOR PLAN 2
Recently dx with lobar segmental PE on Eliquis.   - lovenox bridge to coumadin  goal inr 2-3, monitor daily INR  - pt to continue INR checks at [facility]

## 2019-07-23 NOTE — PROGRESS NOTE ADULT - SUBJECTIVE AND OBJECTIVE BOX
Patient is a 64y old  Female who presents with a chief complaint of abd pain (2019 02:04)      HPI:  64F w/ PMH of PE dx 2019 (on eliquis), cerebral aneurysm s/p  shunt, HTN, HLD, metastatic GB carcinoma s/p lap cholecystectomy and partial liver ressection, on chemo (capecitabine?), last dose 5 weeks ago, now p/w abd pain & distention.     Patient reports intermittent diffuse abdominal pain with associated nausea and NBNB emesis x 3 episodes, as well as decreased appetite for the past week. Reports constipation last BM Tuesday. 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 Was dx with PE at that time as well and started on Eliquis. Reports never fulling recovered since discharge last time. BM were mostly small pellets of fecal matters. Otherwise denies fever, chill, CP, diarrhea, urinary concerns. Reports mild SOB, especially with exertion over the last week as well.     In ED, VSS. CT scan demonstrated increased volume ascites. No SBO but possible IVC clot. Vascular surgery c/s. s/p 1L bolus, Tylenol, morphine and Zofran.    At the time of my examination. Patient reports 2/10 abd pain, and no longer feeling nauseated. (2019 02:04)      the pt came into the hospital here after being admitted here last month. She has been treated with conventional chemotherapy that has failed and she is seeking second opinions. Over the last few days she has developed progressive increase in abdominal girth and on eliquis was found on ct of the abdomen to have a new IVC clot. She will require a tap but she needs to be off her eliquis for at least 24 hours. After the pt will need to be placed on sq lovenox as she appears to be a eliquis failure.  pt had a paracentesis done yesterday and the fluid removed was about 3000 cc. The fluid unfortunately is compartmentalized and she feels the fluid returned quickly in her abdomen overnight. This may be from IVC blockage and this afternoon the pt should be placed on lovenox 1.0 mg/kg sq twice a day q 12 hours as she appears to be refractory to the noac meds. The case was discussed in detail with the pt and the daughter at the bedside and the pt and the daughter will have to have teaching on lovenox injections before discharge. Pt may need another paracentesis also before going home as the abdomen is very swollen again this am.   pt is stable and started the Lovenox and tolerating it well, pt still feels a good deal of abdominal pressure.  the pt was seen from 1130 am to 1215 pm and while I was in the room the team notified me that the pt did not have adequate insurance for Lovenox as the Eliquis was not effective here. She developed a ivc clot on the med. She cannot afford the Lovenox and therefore we will try coumadin and have to keep the pt her till the inr is therapeutic. The case was discussed with the daughter by phone, social work the PA and with the hospitalist. Pt is not happy and it took lengthy explanations to get her to understand the above.  coumadin dosing continues along with the Lovenox Latter can be stopped when inr is about 2 or more.  ICU Vital Signs Last 24 Hrs  T(C): 36.9 (2019 05:27), Max: 36.9 (2019 05:27)  T(F): 98.5 (2019 05:27), Max: 98.5 (2019 05:27)  HR: 88 (2019 05:27) (88 - 95)  BP: 143/92 (2019 05:27) (125/85 - 143/92)  BP(mean): --  ABP: --  ABP(mean): --  RR: 18 (2019 05:27) (18 - 18)  SpO2: 96% (2019 05:27) (96% - 97%)    134<L>  |  93<L>  |  8   ----------------------------<  110<H>  3.6   |  28  |  0.65    Ca    9.7      2019 14:17    TPro  7.2  /  Alb  3.7  /  TBili  0.4  /  DBili  x   /  AST  11  /  ALT  7<L>  /  AlkPhos  94  07-18    PT/INR - ( 2019 11:17 )   PT: 14.6 sec;   INR: 1.27 ratio         PTT - ( 2019 11:17 )  PTT:33.1 sec  Urinalysis Basic - ( 2019 12:34 )    Color: Light Yellow / Appearance: Clear / S.008 / pH: x  Gluc: x / Ketone: Negative  / Bili: Negative / Urobili: Negative   Blood: x / Protein: Negative / Nitrite: Negative   Leuk Esterase: Negative / RBC: 3 /hpf / WBC 2 /HPF   Sq Epi: x / Non Sq Epi: 0 /hpf / Bacteria: Negative        ROS:  Negative except for:    PAST MEDICAL & SURGICAL HISTORY:  Malignant neoplasm of gallbladder  History of osteoarthritis  Gallstones: dx: &#x27;   Hypercholesterolemia: Borderline. Diet-managed  Multiparity  Hypertension  Vitamin D deficiency  Cerebral aneurysm: &#x27; :  surgically treated  History of cholecystectomy:   S/P ventricular shunt placement: &#x27;   S/P cerebral aneurysm operation: &#x27; :  Clipping of Cerebral Aneurysm      SOCIAL HISTORY:    FAMILY HISTORY:  CAD (coronary artery disease): Father      Allergies    No Known Allergies    Intolerances        PHYSICAL EXAM  General: adult in NAD  HEENT: clear oropharynx, anicteric sclera, pink conjunctivae  Neck: supple  CV: normal S1S2 with no murmur rubs or gallops  Lungs: decreased breath sounds at the bases bilaterally  Abdomen: soft non-tender massively distended from ascites. no hepatosplenomegaly  Ext: no clubbing cyanosis +1  edema  Skin: no rashes and no petechiae  Neuro: alert and oriented X3 no focal deficits    BLOOD SMEAR INTERPRETATION:    RADIOLOGY :

## 2019-07-23 NOTE — PROGRESS NOTE ADULT - ASSESSMENT
7/19 the pt came into the hospital here after being admitted here last month. She has been treated with conventional chemotherapy that has failed and she is seeking second opinions. Over the last few days she has developed progressive increase in abdominal girth and on Eliquis was found on ct of the abdomen to have a new IVC clot. She will require a tap but she needs to be off her Eliquis for at least 24 hours. After the pt will need to be placed on sq Lovenox as she appears to be a Eliquis failure.    7/19 the pt came into the hospital here after being admitted here last month. She has been treated with conventional chemotherapy that has failed and she is seeking second opinions. Over the last few days she has developed progressive increase in abdominal girth and on Eliquis was found on ct of the abdomen to have a new IVC clot. She will require a tap but she needs to be off her eliquis for at least 24 hours. After the pt will need to be placed on sq Lovenox as she appears to be a Eliquis failure. 7/20 pt had a paracentesis done yesterday and the fluid removed was about 3000 cc. The fluid unfortunately is compartmentalized and she feels the fluid returned quickly in her abdomen overnight. This may be from IVC blockage and this afternoon the pt should be placed on Lovenox 1.0 mg/kg sq twice a day q 12 hours as she appears to be refractory to the noac meds. The case was discussed in detail with the pt and the daughter at the bedside and the pt and the daughter will have to have teaching on Lovenox injections before discharge. Pt may need another paracentesis also before going home as the abdomen is very swollen again this am.  7/21 pt is stable and started the lovenox and tolerating it well, pt still feels a good deal of abdominal pressure.   7/22 the pt was seen from 1130 am to 1215 pm and while I was in the room the team notified me that the pt did not have adequate insurance for Lovenox as the Eliquis was not effective here. She developed a ivc clot on the med. She cannot afford the Lovenox and therefore we will try coumadin and have to keep the pt her till the inr is therapeutic. The case was discussed with the daughter by phone, social work the PA and with the hospitalist. Pt is not happy and it took lengthy explanations to get her to understand the above.   7/23 coumadin dosing continues along with the Lovenox Latter can be stopped when inr is about 2 or more.

## 2019-07-23 NOTE — PROGRESS NOTE ADULT - SUBJECTIVE AND OBJECTIVE BOX
Hospitalist- Valdez Le MD  Pager: [phone]  If no response or off-hours, page [phone]  -------------------------------------  Patient is a 64y old  Female who presents with a chief complaint of abd pain (22 Jul 2019 12:59)  Subjective: No acute event sovernight. INR slightly increased. pt feels well- abd pain improved, feels distended but not sob, no fevers/chills.     14 point ros otherwise negative.     VITAL SIGNS:  Vital Signs Last 24 Hrs  T(C): 36.9 (23 Jul 2019 05:27), Max: 36.9 (23 Jul 2019 05:27)  T(F): 98.5 (23 Jul 2019 05:27), Max: 98.5 (23 Jul 2019 05:27)  HR: 88 (23 Jul 2019 05:27) (88 - 95)  BP: 143/92 (23 Jul 2019 05:27) (117/82 - 143/92)  BP(mean): --  RR: 18 (23 Jul 2019 05:27) (18 - 18)  SpO2: 96% (23 Jul 2019 05:27) (96% - 97%)      PHYSICAL EXAM:     GENERAL: no acute distress  HEENT: PERRLA, EOMI, moist oropharynx   RESPIRATORY: CTAB, no w/c   CARDIOVASCULAR: RRR, no murmurs, gallops, rubs  ABDOMINAL: soft, non-tender, +distended, positive bowel sounds   EXTREMITIES: no clubbing, cyanosis, or edema  NEUROLOGICAL: alert and oriented x 3, non-focal  SKIN: no rashes or lesions   MUSCULOSKELETAL: no gross joint deformity              CAPILLARY BLOOD GLUCOSE          MEDICATIONS  (STANDING):  chlorhexidine 2% Cloths 1 Application(s) Topical daily  docusate sodium 100 milliGRAM(s) Oral two times a day  enoxaparin Injectable 80 milliGRAM(s) SubCutaneous every 12 hours  losartan 100 milliGRAM(s) Oral daily  polyethylene glycol 3350 17 Gram(s) Oral two times a day  senna 2 Tablet(s) Oral at bedtime  sodium chloride 0.9%. 1000 milliLiter(s) (60 mL/Hr) IV Continuous <Continuous>  warfarin 5 milliGRAM(s) Oral once    MEDICATIONS  (PRN):  acetaminophen   Tablet .. 650 milliGRAM(s) Oral every 6 hours PRN Temp greater or equal to 38C (100.4F), Mild Pain (1 - 3)  oxyCODONE    5 mG/acetaminophen 325 mG 1 Tablet(s) Oral every 8 hours PRN Severe Pain (7 - 10)

## 2019-07-24 LAB
CULTURE RESULTS: SIGNIFICANT CHANGE UP
INR BLD: 1.52 RATIO — HIGH (ref 0.88–1.16)
PROTHROM AB SERPL-ACNC: 17.7 SEC — HIGH (ref 10–13.1)
SPECIMEN SOURCE: SIGNIFICANT CHANGE UP

## 2019-07-24 PROCEDURE — 99233 SBSQ HOSP IP/OBS HIGH 50: CPT

## 2019-07-24 PROCEDURE — 76705 ECHO EXAM OF ABDOMEN: CPT | Mod: 26

## 2019-07-24 RX ADMIN — ENOXAPARIN SODIUM 80 MILLIGRAM(S): 100 INJECTION SUBCUTANEOUS at 06:52

## 2019-07-24 RX ADMIN — CHLORHEXIDINE GLUCONATE 1 APPLICATION(S): 213 SOLUTION TOPICAL at 11:25

## 2019-07-24 RX ADMIN — Medication 650 MILLIGRAM(S): at 18:46

## 2019-07-24 RX ADMIN — Medication 100 MILLIGRAM(S): at 17:03

## 2019-07-24 RX ADMIN — Medication 650 MILLIGRAM(S): at 19:30

## 2019-07-24 RX ADMIN — ONDANSETRON 4 MILLIGRAM(S): 8 TABLET, FILM COATED ORAL at 11:24

## 2019-07-24 RX ADMIN — POLYETHYLENE GLYCOL 3350 17 GRAM(S): 17 POWDER, FOR SOLUTION ORAL at 06:52

## 2019-07-24 RX ADMIN — ENOXAPARIN SODIUM 80 MILLIGRAM(S): 100 INJECTION SUBCUTANEOUS at 17:03

## 2019-07-24 RX ADMIN — LOSARTAN POTASSIUM 100 MILLIGRAM(S): 100 TABLET, FILM COATED ORAL at 06:52

## 2019-07-24 RX ADMIN — Medication 100 MILLIGRAM(S): at 06:52

## 2019-07-24 NOTE — DIETITIAN INITIAL EVALUATION ADULT. - PROBLEM SELECTOR PLAN 8
- DVT: Hold Eliquis - pending further heme/onc recs and IR recs  - Diet: NPO pending IR evaluation for paracentesis

## 2019-07-24 NOTE — CHART NOTE - NSCHARTNOTEFT_GEN_A_CORE
Upon Nutritional Assessment by the Registered Dietitian your patient was determined to meet criteria / has evidence of the following diagnosis/diagnoses:          [ ]  Mild Protein Calorie Malnutrition        [ ]  Moderate Protein Calorie Malnutrition        [x ] Severe Protein Calorie Malnutrition        [ ] Unspecified Protein Calorie Malnutrition        [ ] Underweight / BMI <19        [ ] Morbid Obesity / BMI > 40      Findings as based on:  [x ] Comprehensive nutrition assessment   [ ] Nutrition Focused Physical Exam  [x ] Other: Pt w/poor po intakes/frequently skipped meals, nausea w/early satiety, 27lbs(15%) wt loss x 1 month      Nutrition Plan/Recommendations:  Recommend to continue Regular diet, pt declines supplements.         PROVIDER Section:     By signing this assessment you are acknowledging and agree with the diagnosis/diagnoses assigned by the Registered Dietitian    Comments:

## 2019-07-24 NOTE — DIETITIAN INITIAL EVALUATION ADULT. - PERTINENT MEDS FT
MEDICATIONS  (STANDING):  chlorhexidine 2% Cloths 1 Application(s) Topical daily  docusate sodium 100 milliGRAM(s) Oral two times a day  enoxaparin Injectable 80 milliGRAM(s) SubCutaneous every 12 hours  losartan 100 milliGRAM(s) Oral daily  polyethylene glycol 3350 17 Gram(s) Oral two times a day  senna 2 Tablet(s) Oral at bedtime  sodium chloride 0.9%. 1000 milliLiter(s) (60 mL/Hr) IV Continuous <Continuous>    MEDICATIONS  (PRN):  acetaminophen   Tablet .. 650 milliGRAM(s) Oral every 6 hours PRN Temp greater or equal to 38C (100.4F), Mild Pain (1 - 3)  ondansetron Injectable 4 milliGRAM(s) IV Push every 6 hours PRN Nausea and/or Vomiting  oxyCODONE    5 mG/acetaminophen 325 mG 1 Tablet(s) Oral every 8 hours PRN Severe Pain (7 - 10)

## 2019-07-24 NOTE — PROGRESS NOTE ADULT - SUBJECTIVE AND OBJECTIVE BOX
Patient is a 64y old  Female who presents with a chief complaint of abd pain (2019 02:04)      HPI:  64F w/ PMH of PE dx 2019 (on eliquis), cerebral aneurysm s/p  shunt, HTN, HLD, metastatic GB carcinoma s/p lap cholecystectomy and partial liver ressection, on chemo (capecitabine?), last dose 5 weeks ago, now p/w abd pain & distention.     Patient reports intermittent diffuse abdominal pain with associated nausea and NBNB emesis x 3 episodes, as well as decreased appetite for the past week. Reports constipation last BM Tuesday. 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 Was dx with PE at that time as well and started on Eliquis. Reports never fulling recovered since discharge last time. BM were mostly small pellets of fecal matters. Otherwise denies fever, chill, CP, diarrhea, urinary concerns. Reports mild SOB, especially with exertion over the last week as well.     In ED, VSS. CT scan demonstrated increased volume ascites. No SBO but possible IVC clot. Vascular surgery c/s. s/p 1L bolus, Tylenol, morphine and Zofran.    At the time of my examination. Patient reports 2/10 abd pain, and no longer feeling nauseated. (2019 02:04)      the pt came into the hospital here after being admitted here last month. She has been treated with conventional chemotherapy that has failed and she is seeking second opinions. Over the last few days she has developed progressive increase in abdominal girth and on eliquis was found on ct of the abdomen to have a new IVC clot. She will require a tap but she needs to be off her eliquis for at least 24 hours. After the pt will need to be placed on sq lovenox as she appears to be a eliquis failure.  pt had a paracentesis done yesterday and the fluid removed was about 3000 cc. The fluid unfortunately is compartmentalized and she feels the fluid returned quickly in her abdomen overnight. This may be from IVC blockage and this afternoon the pt should be placed on lovenox 1.0 mg/kg sq twice a day q 12 hours as she appears to be refractory to the noac meds. The case was discussed in detail with the pt and the daughter at the bedside and the pt and the daughter will have to have teaching on lovenox injections before discharge. Pt may need another paracentesis also before going home as the abdomen is very swollen again this am.   pt is stable and started the Lovenox and tolerating it well, pt still feels a good deal of abdominal pressure.  the pt was seen from 1130 am to 1215 pm and while I was in the room the team notified me that the pt did not have adequate insurance for Lovenox as the Eliquis was not effective here. She developed a ivc clot on the med. She cannot afford the Lovenox and therefore we will try coumadin and have to keep the pt her till the inr is therapeutic. The case was discussed with the daughter by phone, social work the PA and with the hospitalist. Pt is not happy and it took lengthy explanations to get her to understand the above.  coumadin dosing continues along with the Lovenox Latter can be stopped when inr is about 2 or more.  pt is stable and inr about 1.5 pt will have coumadin held on this date and will have another paracentesis as she was becoming symptomatic again.  ICU Vital Signs Last 24 Hrs  T(C): 36.5 (2019 14:09), Max: 37 (2019 06:02)  T(F): 97.7 (2019 14:09), Max: 98.6 (2019 06:02)  HR: 98 (2019 14:09) (85 - 98)  BP: 131/87 (2019 14:09) (131/85 - 134/87)  BP(mean): --  ABP: --  ABP(mean): --  RR: 18 (2019 14:09) (18 - 18)  SpO2: 97% (2019 14:09) (96% - 97%)    134<L>  |  93<L>  |  8   ----------------------------<  110<H>  3.6   |  28  |  0.65    Ca    9.7      2019 14:17    TPro  7.2  /  Alb  3.7  /  TBili  0.4  /  DBili  x   /  AST  11  /  ALT  7<L>  /  AlkPhos  94  07-18    PT/INR - ( 2019 09:41 )   PT: 17.7 sec;   INR: 1.52 ratio                PTT - ( 2019 11:17 )  PTT:33.1 sec  Urinalysis Basic - ( 2019 12:34 )    Color: Light Yellow / Appearance: Clear / S.008 / pH: x  Gluc: x / Ketone: Negative  / Bili: Negative / Urobili: Negative   Blood: x / Protein: Negative / Nitrite: Negative   Leuk Esterase: Negative / RBC: 3 /hpf / WBC 2 /HPF   Sq Epi: x / Non Sq Epi: 0 /hpf / Bacteria: Negative        ROS:  Negative except for:    PAST MEDICAL & SURGICAL HISTORY:  Malignant neoplasm of gallbladder  History of osteoarthritis  Gallstones: dx: &#x27;   Hypercholesterolemia: Borderline. Diet-managed  Multiparity  Hypertension  Vitamin D deficiency  Cerebral aneurysm: &#x27; :  surgically treated  History of cholecystectomy:   S/P ventricular shunt placement: &#x27;   S/P cerebral aneurysm operation: &#x27; :  Clipping of Cerebral Aneurysm      SOCIAL HISTORY:    FAMILY HISTORY:  CAD (coronary artery disease): Father      Allergies    No Known Allergies    Intolerances        PHYSICAL EXAM  General: adult in NAD  HEENT: clear oropharynx, anicteric sclera, pink conjunctivae  Neck: supple  CV: normal S1S2 with no murmur rubs or gallops  Lungs: decreased breath sounds at the bases bilaterally  Abdomen: soft non-tender massively distended from ascites. no hepatosplenomegaly  Ext: no clubbing cyanosis +1  edema  Skin: no rashes and no petechiae  Neuro: alert and oriented X3 no focal deficits    BLOOD SMEAR INTERPRETATION:    RADIOLOGY :

## 2019-07-24 NOTE — DIETITIAN INITIAL EVALUATION ADULT. - PROBLEM SELECTOR PLAN 6
Mild hydroureteronephrosis on CT scan, likely secondary to mets /ascites   - pending IR drain of ascites  - adequate urine output at the moment  - daily BMP for kidney function monitoring

## 2019-07-24 NOTE — DIETITIAN INITIAL EVALUATION ADULT. - ETIOLOGY
inadequate protein energy intakes in setting of increased nutrient needs lack of prior exposure to nutrition-related information

## 2019-07-24 NOTE — DIETITIAN INITIAL EVALUATION ADULT. - PROBLEM SELECTOR PLAN 5
istop # [id number]  oxycodone-acetaminophen 5-325 mg tab, supply 90 for 23days, refilled July  - c/w home percocet for severe back pain

## 2019-07-24 NOTE — DIETITIAN INITIAL EVALUATION ADULT. - LAB (SPECIFY)
Continue to monitor phosphorous level to see if restriction is required. Pt currently with < 50% po intakes and would not recommend dietary restrictions at this time.

## 2019-07-24 NOTE — DIETITIAN INITIAL EVALUATION ADULT. - OTHER INFO
Pt reports poor appetite and decreased po intakes since 1 month PTA. Pt was recently admitted for SBO last month and reports difficulty tolerating usual meal portions since then, usual diet recall obtained - pt has small meals/skips meals frequently. Pt remains with poor appetite, reports < 50% po intakes of meals. Pt with nausea and episodes of emesis (yesterday and today). +BM today. Pt denies chewing/swallowing difficulties. NKFA. PTA denies taking supplements.

## 2019-07-24 NOTE — DIETITIAN INITIAL EVALUATION ADULT. - PROBLEM SELECTOR PLAN 1
w/ associated abd distention. Likely 2/2 worsening ascites in the setting malignancy + constipation.  - CT A/P negative for SBO, and increased ascites  - IR consult in AM for paracentesis  - Bowel regimen after paracentesis   - c/w Tylenol PRN for mod/mod pain, and percocet for severe pain  - PRN Zofran for nausea  - daily EKG for qtc monitoring

## 2019-07-24 NOTE — DIETITIAN INITIAL EVALUATION ADULT. - ENERGY NEEDS
Height: 63 inches, Weight: 168 pounds  BMI: 29.7 kg/m2 IBW: 115 pounds (+/-10%), %IBW: 146%  Pertinent Info: 65 y/o female w/ PMH of PE dx June 2019 (on eliquis), cerebral aneurysm s/p  shunt, HTN, metastatic gallbladder carcinoma s/p lap cholecystectomy and partial liver resection on chemo admitted with abdominal pain and distention likely secondary to worsening ascites from malignancy  s/p paracentesis removed 3.6liter (7/19), PE IVC Thrombus on SQ Lovenox bridge to Coumadin. No edema, no pressure ulcers noted at this time.

## 2019-07-24 NOTE — PROGRESS NOTE ADULT - SUBJECTIVE AND OBJECTIVE BOX
Hospitalist- Valdez Le MD  Pager: [phone]  If no response or off-hours, page [phone]  -------------------------------------  Patient is a 64y old  Female who presents with a chief complaint of abd pain (24 Jul 2019 16:17)  Subjective: No acute events overnight. Today reports she feels well overall, has noticed some abd stretching type pain. +poor appetite, +nausea, vomiting x 1, controlled with zofran. No fevers/chills.     14 point ros otherwise negative.     VITAL SIGNS:  Vital Signs Last 24 Hrs  T(C): 36.5 (24 Jul 2019 14:09), Max: 37 (24 Jul 2019 06:02)  T(F): 97.7 (24 Jul 2019 14:09), Max: 98.6 (24 Jul 2019 06:02)  HR: 98 (24 Jul 2019 14:09) (85 - 98)  BP: 131/87 (24 Jul 2019 14:09) (131/85 - 134/87)  BP(mean): --  RR: 18 (24 Jul 2019 14:09) (18 - 18)  SpO2: 97% (24 Jul 2019 14:09) (96% - 97%)      PHYSICAL EXAM:     GENERAL: no acute distress  HEENT: PERRLA, EOMI, moist oropharynx   RESPIRATORY: CTAB, no w/c   CARDIOVASCULAR: RRR, no murmurs, gallops, rubs  ABDOMINAL: soft, non-tender,+distended, positive bowel sounds   EXTREMITIES: no clubbing, cyanosis, or edema  NEUROLOGICAL: alert and oriented x 3, non-focal  SKIN: no rashes or lesions   MUSCULOSKELETAL: no gross joint deformity              CAPILLARY BLOOD GLUCOSE          MEDICATIONS  (STANDING):  chlorhexidine 2% Cloths 1 Application(s) Topical daily  docusate sodium 100 milliGRAM(s) Oral two times a day  enoxaparin Injectable 80 milliGRAM(s) SubCutaneous every 12 hours  losartan 100 milliGRAM(s) Oral daily  polyethylene glycol 3350 17 Gram(s) Oral two times a day  senna 2 Tablet(s) Oral at bedtime  sodium chloride 0.9%. 1000 milliLiter(s) (60 mL/Hr) IV Continuous <Continuous>    MEDICATIONS  (PRN):  acetaminophen   Tablet .. 650 milliGRAM(s) Oral every 6 hours PRN Temp greater or equal to 38C (100.4F), Mild Pain (1 - 3)  ondansetron Injectable 4 milliGRAM(s) IV Push every 6 hours PRN Nausea and/or Vomiting  oxyCODONE    5 mG/acetaminophen 325 mG 1 Tablet(s) Oral every 8 hours PRN Severe Pain (7 - 10)

## 2019-07-24 NOTE — PROGRESS NOTE ADULT - PROBLEM SELECTOR PLAN 2
Recently dx with lobar segmental PE on Eliquis.   - lovenox bridge to coumadin  goal inr 2-3, monitor daily INR- hold coumadin tonight due to repeat paracentesis- may resume tomorrow  - pt to continue INR checks at [facility]

## 2019-07-24 NOTE — PROGRESS NOTE ADULT - SUBJECTIVE AND OBJECTIVE BOX
Vascular & Interventional Radiology Post-Procedure Note    Pre-Procedure Diagnosis: abdominal pain for new ascites  Post-Procedure Diagnosis: Same as pre.  Indications for Procedure: abdominal pain for new ascites    Operators: Navi Herrera    Procedure Details/Findings: Successful drainage of 2 L of ascites (clear yellow)  Access (if applicable): L upper quadrant    Complications: None  Estimated Blood Loss: Minimal  Specimen: None  Contrast: None  Sedation: local  Patient Condition/Disposition: IRS > Floor    Plan:     Vital signs per routine  Diet/activity advancement as per primary team  Follow-up pain control

## 2019-07-24 NOTE — DIETITIAN INITIAL EVALUATION ADULT. - PROBLEM SELECTOR PLAN 3
suspicious IVC thrombus on CT scan  - vascular surgery consulted, heraclio recs - per vacular, no need for CT venogram  - to speak with Onc about needs to change AC given possible IVC thrombus

## 2019-07-24 NOTE — DIETITIAN INITIAL EVALUATION ADULT. - SIGNS/SYMPTOMS
Pt w/poor po intakes 2/2 nausea w/early satiety, 27lbs(15%) wt loss x 1 month, catabolic illness pt new to Coumadin this admission

## 2019-07-24 NOTE — PROGRESS NOTE ADULT - ASSESSMENT
7/19 the pt came into the hospital here after being admitted here last month. She has been treated with conventional chemotherapy that has failed and she is seeking second opinions. Over the last few days she has developed progressive increase in abdominal girth and on Eliquis was found on ct of the abdomen to have a new IVC clot. She will require a tap but she needs to be off her Eliquis for at least 24 hours. After the pt will need to be placed on sq Lovenox as she appears to be a Eliquis failure.    7/19 the pt came into the hospital here after being admitted here last month. She has been treated with conventional chemotherapy that has failed and she is seeking second opinions. Over the last few days she has developed progressive increase in abdominal girth and on Eliquis was found on ct of the abdomen to have a new IVC clot. She will require a tap but she needs to be off her eliquis for at least 24 hours. After the pt will need to be placed on sq Lovenox as she appears to be a Eliquis failure. 7/20 pt had a paracentesis done yesterday and the fluid removed was about 3000 cc. The fluid unfortunately is compartmentalized and she feels the fluid returned quickly in her abdomen overnight. This may be from IVC blockage and this afternoon the pt should be placed on Lovenox 1.0 mg/kg sq twice a day q 12 hours as she appears to be refractory to the noac meds. The case was discussed in detail with the pt and the daughter at the bedside and the pt and the daughter will have to have teaching on Lovenox injections before discharge. Pt may need another paracentesis also before going home as the abdomen is very swollen again this am.  7/21 pt is stable and started the lovenox and tolerating it well, pt still feels a good deal of abdominal pressure.   7/22 the pt was seen from 1130 am to 1215 pm and while I was in the room the team notified me that the pt did not have adequate insurance for Lovenox as the Eliquis was not effective here. She developed a ivc clot on the med. She cannot afford the Lovenox and therefore we will try coumadin and have to keep the pt her till the inr is therapeutic. The case was discussed with the daughter by phone, social work the PA and with the hospitalist. Pt is not happy and it took lengthy explanations to get her to understand the above.   7/23 coumadin dosing continues along with the Lovenox Latter can be stopped when inr is about 2 or more.   7/24 pt is stable and inr about 1.5 pt will have coumadin held on this date and will have another paracentesis as she was becoming symptomatic again.

## 2019-07-24 NOTE — DIETITIAN INITIAL EVALUATION ADULT. - ADD RECOMMEND
1) Severe Malnutrition - Pt declines supplements at this time. 2) Encouraged pt to increase po intake of meals as tolerated and discussed importance of adequate nutrition intake. 3) Discussed trying cold bland food/snack items to try for nausea, small frequent meals/snacks for early satiety. 4) Coumadin diet education provided with written education.

## 2019-07-25 DIAGNOSIS — D49.0 NEOPLASM OF UNSPECIFIED BEHAVIOR OF DIGESTIVE SYSTEM: ICD-10-CM

## 2019-07-25 DIAGNOSIS — R18.8 OTHER ASCITES: ICD-10-CM

## 2019-07-25 LAB
ANION GAP SERPL CALC-SCNC: 12 MMOL/L — SIGNIFICANT CHANGE UP (ref 5–17)
BUN SERPL-MCNC: 5 MG/DL — LOW (ref 7–23)
CALCIUM SERPL-MCNC: 9 MG/DL — SIGNIFICANT CHANGE UP (ref 8.4–10.5)
CHLORIDE SERPL-SCNC: 100 MMOL/L — SIGNIFICANT CHANGE UP (ref 96–108)
CO2 SERPL-SCNC: 27 MMOL/L — SIGNIFICANT CHANGE UP (ref 22–31)
CREAT SERPL-MCNC: 0.68 MG/DL — SIGNIFICANT CHANGE UP (ref 0.5–1.3)
GLUCOSE SERPL-MCNC: 105 MG/DL — HIGH (ref 70–99)
HCT VFR BLD CALC: 32.4 % — LOW (ref 34.5–45)
HGB BLD-MCNC: 10.9 G/DL — LOW (ref 11.5–15.5)
INR BLD: 1.87 RATIO — HIGH (ref 0.88–1.16)
MCHC RBC-ENTMCNC: 30.3 PG — SIGNIFICANT CHANGE UP (ref 27–34)
MCHC RBC-ENTMCNC: 33.6 GM/DL — SIGNIFICANT CHANGE UP (ref 32–36)
MCV RBC AUTO: 90 FL — SIGNIFICANT CHANGE UP (ref 80–100)
PLATELET # BLD AUTO: 570 K/UL — HIGH (ref 150–400)
POTASSIUM SERPL-MCNC: 3.2 MMOL/L — LOW (ref 3.5–5.3)
POTASSIUM SERPL-SCNC: 3.2 MMOL/L — LOW (ref 3.5–5.3)
PROTHROM AB SERPL-ACNC: 21.7 SEC — HIGH (ref 10–13.1)
RBC # BLD: 3.6 M/UL — LOW (ref 3.8–5.2)
RBC # FLD: 15.4 % — HIGH (ref 10.3–14.5)
SODIUM SERPL-SCNC: 139 MMOL/L — SIGNIFICANT CHANGE UP (ref 135–145)
WBC # BLD: 6.99 K/UL — SIGNIFICANT CHANGE UP (ref 3.8–10.5)
WBC # FLD AUTO: 6.99 K/UL — SIGNIFICANT CHANGE UP (ref 3.8–10.5)

## 2019-07-25 PROCEDURE — 99233 SBSQ HOSP IP/OBS HIGH 50: CPT

## 2019-07-25 PROCEDURE — 49083 ABD PARACENTESIS W/IMAGING: CPT

## 2019-07-25 RX ORDER — WARFARIN SODIUM 2.5 MG/1
4 TABLET ORAL ONCE
Refills: 0 | Status: COMPLETED | OUTPATIENT
Start: 2019-07-25 | End: 2019-07-25

## 2019-07-25 RX ORDER — POTASSIUM CHLORIDE 20 MEQ
40 PACKET (EA) ORAL ONCE
Refills: 0 | Status: COMPLETED | OUTPATIENT
Start: 2019-07-25 | End: 2019-07-25

## 2019-07-25 RX ADMIN — ENOXAPARIN SODIUM 80 MILLIGRAM(S): 100 INJECTION SUBCUTANEOUS at 05:07

## 2019-07-25 RX ADMIN — WARFARIN SODIUM 4 MILLIGRAM(S): 2.5 TABLET ORAL at 21:58

## 2019-07-25 RX ADMIN — OXYCODONE AND ACETAMINOPHEN 1 TABLET(S): 5; 325 TABLET ORAL at 18:51

## 2019-07-25 RX ADMIN — POLYETHYLENE GLYCOL 3350 17 GRAM(S): 17 POWDER, FOR SOLUTION ORAL at 17:37

## 2019-07-25 RX ADMIN — Medication 100 MILLIGRAM(S): at 05:07

## 2019-07-25 RX ADMIN — SENNA PLUS 2 TABLET(S): 8.6 TABLET ORAL at 21:58

## 2019-07-25 RX ADMIN — OXYCODONE AND ACETAMINOPHEN 1 TABLET(S): 5; 325 TABLET ORAL at 19:51

## 2019-07-25 RX ADMIN — Medication 40 MILLIEQUIVALENT(S): at 09:51

## 2019-07-25 RX ADMIN — POLYETHYLENE GLYCOL 3350 17 GRAM(S): 17 POWDER, FOR SOLUTION ORAL at 05:07

## 2019-07-25 RX ADMIN — ENOXAPARIN SODIUM 80 MILLIGRAM(S): 100 INJECTION SUBCUTANEOUS at 17:37

## 2019-07-25 RX ADMIN — Medication 100 MILLIGRAM(S): at 17:37

## 2019-07-25 RX ADMIN — CHLORHEXIDINE GLUCONATE 1 APPLICATION(S): 213 SOLUTION TOPICAL at 11:51

## 2019-07-25 RX ADMIN — LOSARTAN POTASSIUM 100 MILLIGRAM(S): 100 TABLET, FILM COATED ORAL at 05:07

## 2019-07-25 NOTE — PROGRESS NOTE ADULT - ASSESSMENT
Patient with metastatic gallbladder cancer admitted who has failed 1st line therapy seeking additional opinions admitted with increasing abdominal girth and new blood clot in the IVC. Patient is now on coumadin and is s/p 2 sessions of paracentesis.

## 2019-07-25 NOTE — PROGRESS NOTE ADULT - SUBJECTIVE AND OBJECTIVE BOX
Hospitalist- Valdez Le MD  Pager: [phone]  If no response or off-hours, page [phone]  -------------------------------------  Patient is a 64y old  Female who presents with a chief complaint of abd pain (25 Jul 2019 10:00)  Subjective: s/p repeat ascites tap yesterday with 2L removed. Per IR, same pocket as previous tap. today patient feels well, no aches/pains, no fevers/chills, nausea and po tolerance improved. She states that she knows no treatment can be offered but she is not ready to 'just die,' and wants to go home. She does not want to suffer and experience pain as she dies. She is amenable to home hospice evaluation.    14 point ros otherwise negative.     VITAL SIGNS:  Vital Signs Last 24 Hrs  T(C): 36.5 (25 Jul 2019 13:59), Max: 37 (24 Jul 2019 21:00)  T(F): 97.7 (25 Jul 2019 13:59), Max: 98.6 (24 Jul 2019 21:00)  HR: 91 (25 Jul 2019 13:59) (88 - 91)  BP: 114/76 (25 Jul 2019 13:59) (114/76 - 128/81)  BP(mean): --  RR: 18 (25 Jul 2019 13:59) (18 - 18)  SpO2: 98% (25 Jul 2019 13:59) (95% - 98%)      PHYSICAL EXAM:     GENERAL: no acute distress  HEENT: PERRLA, EOMI, moist oropharynx   RESPIRATORY: CTAB, no w/c   CARDIOVASCULAR: RRR, no murmurs, gallops, rubs  ABDOMINAL: soft, non-tender, +distended, positive bowel sounds   EXTREMITIES: no clubbing, cyanosis, or edema  NEUROLOGICAL: alert and oriented x 3, non-focal  SKIN: no rashes or lesions   MUSCULOSKELETAL: no gross joint deformity                          10.9   6.99  )-----------( 570      ( 25 Jul 2019 09:48 )             32.4     07-25    139  |  100  |  5<L>  ----------------------------<  105<H>  3.2<L>   |  27  |  0.68    Ca    9.0      25 Jul 2019 07:07        CAPILLARY BLOOD GLUCOSE          MEDICATIONS  (STANDING):  chlorhexidine 2% Cloths 1 Application(s) Topical daily  docusate sodium 100 milliGRAM(s) Oral two times a day  enoxaparin Injectable 80 milliGRAM(s) SubCutaneous every 12 hours  losartan 100 milliGRAM(s) Oral daily  polyethylene glycol 3350 17 Gram(s) Oral two times a day  senna 2 Tablet(s) Oral at bedtime  warfarin 4 milliGRAM(s) Oral once    MEDICATIONS  (PRN):  acetaminophen   Tablet .. 650 milliGRAM(s) Oral every 6 hours PRN Temp greater or equal to 38C (100.4F), Mild Pain (1 - 3)  ondansetron Injectable 4 milliGRAM(s) IV Push every 6 hours PRN Nausea and/or Vomiting  oxyCODONE    5 mG/acetaminophen 325 mG 1 Tablet(s) Oral every 8 hours PRN Severe Pain (7 - 10)

## 2019-07-25 NOTE — PROGRESS NOTE ADULT - PROBLEM SELECTOR PLAN 2
Recently dx with lobar segmental PE on Eliquis. Now found to have IVC thrombus despite eliuis.  - lovenox bridge to coumadin  goal inr 2-3, dose coumadin 4mg po tonight- plan to hold and reverse INR for pleurx placement on monday.  - pt to continue INR checks at [facility]

## 2019-07-25 NOTE — PROGRESS NOTE ADULT - PROBLEM SELECTOR PLAN 8
- DVT: starting full dose lovenox tonight  - Diet: regular  - dispo: likely home with home hospice pending evaluation, pleurx placement, and lovenox bridge to coumadin

## 2019-07-25 NOTE — PROGRESS NOTE ADULT - PROBLEM SELECTOR PLAN 2
Secondary to malignancy. Was on eliquis at that time  - Failed NoAC  - Unable to afford Lovenox  - On coumadin. INR is 1.87. Resume coumadin for target INR of 2.0 to 3.0  - Follow up in outpatient for INR checks

## 2019-07-25 NOTE — PROGRESS NOTE ADULT - SUBJECTIVE AND OBJECTIVE BOX
[facility] Hematology/Oncology - Ml Zhu / Joshua / Braulio - [phone]  Primary Outpatient Hematologist/Oncologist: Dr. Edgar Ying    Subjective  Patient seen and examined. Sitting up in bed. Doing well. Had paracentesis last night. Tolerated well.     Admitting Complaint/History  HPI:  64F w/ PMH of PE dx June 2019 (on eliquis), cerebral aneurysm s/p  shunt, HTN, HLD, metastatic GB carcinoma s/p lap cholecystectomy and partial liver ressection, on chemo (capecitabine?), last dose 5 weeks ago, now p/w abd pain & distention.     Patient reports intermittent diffuse abdominal pain with associated nausea and NBNB emesis x 3 episodes, as well as decreased appetite for the past week. Reports constipation last BM Tuesday. 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 Was dx with PE at that time as well and started on Eliquis. Reports never fulling recovered since discharge last time. BM were mostly small pellets of fecal matters. Otherwise denies fever, chill, CP, diarrhea, urinary concerns. Reports mild SOB, especially with exertion over the last week as well.     In ED, VSS. CT scan demonstrated increased volume ascites. No SBO but possible IVC clot. Vascular surgery c/s. s/p 1L bolus, Tylenol, morphine and Zofran.    At the time of my examination. Patient reports 2/10 abd pain, and no longer feeling nauseated. (19 Jul 2019 02:04)     ROS:  General:  (-)Pain, (-)Decrease appeite, (-)Fevers, (-)Chills, (-)Weight Loss  Eyes: (-)Blurry Vision, (-)Double Vision, (-)Vision Loss  ENT: (-)Sinus Congestion, (-)Decreased Hearing, (-)Nosebleeds, (-)Sore Throat  Cardiac: (-)Chest Pain, (-)Palpitations, (-)Shortness of breathe on exertion  Respiratory: (-)Cough, (-)hemoptysis, (-)Shortness of breathe  GI: (-)Nausea, (-)Vomiting, (-)Diarrhea, (-)Constipation, (-)Melena, (-)BRBPR  : (-)Hematuria, (-)Dysuria, (-)Polyuia  MSK: (-)Back pain, (-)Joint pain, (-)Stiffness  Dermatology: (-)Rash, (-)Itching  Neurology:  (-)Numbness, (-)Tingling, (-)Difficulty Walking, (-)Tremors, (-)Weakness  Psych: (-)Anxiety, (-)Depression, (-)Memory Loss  Hematology:  (-)Easy Bruising, (-)Easy Bleeding, (-)Night Sweats.     Objective  Vital Signs Last 24 Hrs  T(C): 36.8 (25 Jul 2019 04:56), Max: 37 (24 Jul 2019 21:00)  T(F): 98.2 (25 Jul 2019 04:56), Max: 98.6 (24 Jul 2019 21:00)  HR: 88 (25 Jul 2019 04:56) (88 - 98)  BP: 128/81 (25 Jul 2019 04:56) (122/79 - 131/87)  RR: 18 (25 Jul 2019 04:56) (18 - 18)  SpO2: 95% (25 Jul 2019 04:56) (95% - 98%)    Physical Exam:  General: AAO x 3. NAD. Lying in bed comfortably.  HEENT: clear oropharynx, anicteric sclera, pink conjunctivae, EOMi. PERRLA  Cardiac: S1, S2 present. No audible murmurs. RRR  Lungs: CTA B/L.   Abdomen: Soft, Non-Tender, Non-Distended. No palpable hepatosplenomegaly  Extremities: 2+ pulses. No edema  Skin: No Rashes. No petechiae  Neuro: No focal motor or sensory deficits.     Medications:  MEDICATIONS  (STANDING):  chlorhexidine 2% Cloths 1 Application(s) Topical daily  docusate sodium 100 milliGRAM(s) Oral two times a day  enoxaparin Injectable 80 milliGRAM(s) SubCutaneous every 12 hours  losartan 100 milliGRAM(s) Oral daily  polyethylene glycol 3350 17 Gram(s) Oral two times a day  senna 2 Tablet(s) Oral at bedtime    MEDICATIONS  (PRN):  acetaminophen   Tablet .. 650 milliGRAM(s) Oral every 6 hours PRN Temp greater or equal to 38C (100.4F), Mild Pain (1 - 3)  ondansetron Injectable 4 milliGRAM(s) IV Push every 6 hours PRN Nausea and/or Vomiting  oxyCODONE    5 mG/acetaminophen 325 mG 1 Tablet(s) Oral every 8 hours PRN Severe Pain (7 - 10)      Labs:             10.9   6.99  )-----------( 570      ( 25 Jul 2019 09:48 )             32.4     07-25    139  |  100  |  5<L>  ----------------------------<  105<H>  3.2<L>   |  27  |  0.68    Ca    9.0      25 Jul 2019 07:07      PT/INR - ( 25 Jul 2019 09:16 )   PT: 21.7 sec;   INR: 1.87 ratio

## 2019-07-26 LAB
ANION GAP SERPL CALC-SCNC: 14 MMOL/L — SIGNIFICANT CHANGE UP (ref 5–17)
BUN SERPL-MCNC: 5 MG/DL — LOW (ref 7–23)
CALCIUM SERPL-MCNC: 9.3 MG/DL — SIGNIFICANT CHANGE UP (ref 8.4–10.5)
CHLORIDE SERPL-SCNC: 97 MMOL/L — SIGNIFICANT CHANGE UP (ref 96–108)
CO2 SERPL-SCNC: 25 MMOL/L — SIGNIFICANT CHANGE UP (ref 22–31)
CREAT SERPL-MCNC: 0.64 MG/DL — SIGNIFICANT CHANGE UP (ref 0.5–1.3)
GLUCOSE SERPL-MCNC: 123 MG/DL — HIGH (ref 70–99)
INR BLD: 1.66 RATIO — HIGH (ref 0.88–1.16)
MAGNESIUM SERPL-MCNC: 1.6 MG/DL — SIGNIFICANT CHANGE UP (ref 1.6–2.6)
POTASSIUM SERPL-MCNC: 3.7 MMOL/L — SIGNIFICANT CHANGE UP (ref 3.5–5.3)
POTASSIUM SERPL-SCNC: 3.7 MMOL/L — SIGNIFICANT CHANGE UP (ref 3.5–5.3)
PROTHROM AB SERPL-ACNC: 19.1 SEC — HIGH (ref 10–13.1)
SODIUM SERPL-SCNC: 136 MMOL/L — SIGNIFICANT CHANGE UP (ref 135–145)

## 2019-07-26 PROCEDURE — 99233 SBSQ HOSP IP/OBS HIGH 50: CPT

## 2019-07-26 RX ORDER — WARFARIN SODIUM 2.5 MG/1
4 TABLET ORAL ONCE
Refills: 0 | Status: COMPLETED | OUTPATIENT
Start: 2019-07-26 | End: 2019-07-26

## 2019-07-26 RX ADMIN — OXYCODONE AND ACETAMINOPHEN 1 TABLET(S): 5; 325 TABLET ORAL at 10:57

## 2019-07-26 RX ADMIN — POLYETHYLENE GLYCOL 3350 17 GRAM(S): 17 POWDER, FOR SOLUTION ORAL at 06:15

## 2019-07-26 RX ADMIN — ONDANSETRON 4 MILLIGRAM(S): 8 TABLET, FILM COATED ORAL at 06:22

## 2019-07-26 RX ADMIN — ENOXAPARIN SODIUM 80 MILLIGRAM(S): 100 INJECTION SUBCUTANEOUS at 06:15

## 2019-07-26 RX ADMIN — Medication 100 MILLIGRAM(S): at 06:15

## 2019-07-26 RX ADMIN — LOSARTAN POTASSIUM 100 MILLIGRAM(S): 100 TABLET, FILM COATED ORAL at 06:15

## 2019-07-26 RX ADMIN — OXYCODONE AND ACETAMINOPHEN 1 TABLET(S): 5; 325 TABLET ORAL at 10:02

## 2019-07-26 RX ADMIN — CHLORHEXIDINE GLUCONATE 1 APPLICATION(S): 213 SOLUTION TOPICAL at 12:00

## 2019-07-26 RX ADMIN — WARFARIN SODIUM 4 MILLIGRAM(S): 2.5 TABLET ORAL at 21:37

## 2019-07-26 RX ADMIN — POLYETHYLENE GLYCOL 3350 17 GRAM(S): 17 POWDER, FOR SOLUTION ORAL at 17:54

## 2019-07-26 RX ADMIN — SENNA PLUS 2 TABLET(S): 8.6 TABLET ORAL at 21:37

## 2019-07-26 RX ADMIN — Medication 100 MILLIGRAM(S): at 17:54

## 2019-07-26 RX ADMIN — OXYCODONE AND ACETAMINOPHEN 1 TABLET(S): 5; 325 TABLET ORAL at 21:34

## 2019-07-26 RX ADMIN — OXYCODONE AND ACETAMINOPHEN 1 TABLET(S): 5; 325 TABLET ORAL at 22:34

## 2019-07-26 RX ADMIN — ENOXAPARIN SODIUM 80 MILLIGRAM(S): 100 INJECTION SUBCUTANEOUS at 17:53

## 2019-07-26 NOTE — CHART NOTE - NSCHARTNOTEFT_GEN_A_CORE
Nutrition Note    Pt seen for malnutrition follow up. Chart reviewed and events noted. Pt is s/p paracentesis 2 liters removed (7/24), hospice evaluation pending. Pt with family at bedside today, feeling better. Pt reports ongoing poor appetite, had 1 fruit salad this morning and yet to have any lunch. Meds and labs reviewed. No GI distress. +BM yesterday. Nutrition dx: 1) severe malnutrition ongoing - pt now agreeable to trying Ensure Clear 2 x day - discussed with NP. Encouraged pt to increase po intake of meals as tolerated and discussed importance of adequate nutrition intake.  2) Knowledge deficit - Coumadin diet education reviewed with the booklet. Pt able to recall information and teach back. RD remains available.

## 2019-07-27 LAB
INR BLD: 2.26 RATIO — HIGH (ref 0.88–1.16)
PROTHROM AB SERPL-ACNC: 26.4 SEC — HIGH (ref 10–13.1)

## 2019-07-27 PROCEDURE — 99233 SBSQ HOSP IP/OBS HIGH 50: CPT

## 2019-07-27 RX ORDER — OXYCODONE AND ACETAMINOPHEN 5; 325 MG/1; MG/1
1 TABLET ORAL EVERY 8 HOURS
Refills: 0 | Status: DISCONTINUED | OUTPATIENT
Start: 2019-07-27 | End: 2019-08-01

## 2019-07-27 RX ADMIN — ONDANSETRON 4 MILLIGRAM(S): 8 TABLET, FILM COATED ORAL at 06:13

## 2019-07-27 RX ADMIN — POLYETHYLENE GLYCOL 3350 17 GRAM(S): 17 POWDER, FOR SOLUTION ORAL at 17:18

## 2019-07-27 RX ADMIN — OXYCODONE AND ACETAMINOPHEN 1 TABLET(S): 5; 325 TABLET ORAL at 10:51

## 2019-07-27 RX ADMIN — OXYCODONE AND ACETAMINOPHEN 1 TABLET(S): 5; 325 TABLET ORAL at 10:03

## 2019-07-27 RX ADMIN — LOSARTAN POTASSIUM 100 MILLIGRAM(S): 100 TABLET, FILM COATED ORAL at 06:02

## 2019-07-27 RX ADMIN — POLYETHYLENE GLYCOL 3350 17 GRAM(S): 17 POWDER, FOR SOLUTION ORAL at 06:02

## 2019-07-27 RX ADMIN — ENOXAPARIN SODIUM 80 MILLIGRAM(S): 100 INJECTION SUBCUTANEOUS at 17:18

## 2019-07-27 RX ADMIN — OXYCODONE AND ACETAMINOPHEN 1 TABLET(S): 5; 325 TABLET ORAL at 23:00

## 2019-07-27 RX ADMIN — SENNA PLUS 2 TABLET(S): 8.6 TABLET ORAL at 21:47

## 2019-07-27 RX ADMIN — Medication 100 MILLIGRAM(S): at 17:18

## 2019-07-27 RX ADMIN — OXYCODONE AND ACETAMINOPHEN 1 TABLET(S): 5; 325 TABLET ORAL at 22:11

## 2019-07-27 RX ADMIN — Medication 100 MILLIGRAM(S): at 06:02

## 2019-07-27 RX ADMIN — ENOXAPARIN SODIUM 80 MILLIGRAM(S): 100 INJECTION SUBCUTANEOUS at 06:02

## 2019-07-27 RX ADMIN — CHLORHEXIDINE GLUCONATE 1 APPLICATION(S): 213 SOLUTION TOPICAL at 13:06

## 2019-07-27 NOTE — PROGRESS NOTE ADULT - ASSESSMENT
Patient with metastatic gallbladder cancer admitted who has failed 1st line therapy seeking additional opinions admitted with increasing abdominal girth and new blood clot in the IVC. Patient is now on coumadin and is s/p 2 sessions of paracentesis.      Plan: Secondary to malignancy. Was on eliquis at that time  - Failed NoAC  - Unable to afford Lovenox  - On coumadin. INR is 1.87. Resume coumadin for target INR of 2.0 to 3.0  - Follow up in outpatient for INR checks.

## 2019-07-27 NOTE — PROGRESS NOTE ADULT - PROBLEM SELECTOR PLAN 2
Recently dx with lobar segmental PE on Eliquis. Now found to have IVC thrombus despite eliuis.  - lovenox bridge to coumadin  goal inr 2-3, hold all AC sunday.   - pt requesting to continue INR checks with PCP Dr. Barton post discharge Recently dx with lobar segmental PE on Eliquis. Now found to have IVC thrombus despite eliuis.  - lovenox bridge to coumadin  goal inr 2-3- hold coumadin for procedure. Hold lovenox prior to procedure per IR protocol  - continue monitoring daily INR  - pt requesting to continue INR checks with PCP Dr. Barton post discharge Recently dx with lobar segmental PE on Eliquis. Now found to have IVC thrombus despite eliuis.  - lovenox bridge to coumadin  goal inr 2-3- hold coumadin for procedure. Would start heparin gtt if INR < 1.5  - continue monitoring daily INR  - pt requesting to continue INR checks with PCP Dr. Barton post discharge

## 2019-07-27 NOTE — PROGRESS NOTE ADULT - SUBJECTIVE AND OBJECTIVE BOX
[facility] Hematology/Oncology - Ml Zhu / Joshua / Braulio - [phone]  Primary Outpatient Hematologist/Oncologist:     Subjective  Patient seen and examined. Lying in bed comfortably.    Admitting Complaint/History  HPI:  64F w/ PMH of PE dx June 2019 (on eliquis), cerebral aneurysm s/p  shunt, HTN, HLD, metastatic GB carcinoma s/p lap cholecystectomy and partial liver ressection, on chemo (capecitabine?), last dose 5 weeks ago, now p/w abd pain & distention.     Patient reports intermittent diffuse abdominal pain with associated nausea and NBNB emesis x 3 episodes, as well as decreased appetite for the past week. Reports constipation last BM Tuesday. 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 Was dx with PE at that time as well and started on Eliquis. Reports never fulling recovered since discharge last time. BM were mostly small pellets of fecal matters. Otherwise denies fever, chill, CP, diarrhea, urinary concerns. Reports mild SOB, especially with exertion over the last week as well.     In ED, VSS. CT scan demonstrated increased volume ascites. No SBO but possible IVC clot. Vascular surgery c/s. s/p 1L bolus, Tylenol, morphine and Zofran.    At the time of my examination. Patient reports 2/10 abd pain, and no longer feeling nauseated. (19 Jul 2019 02:04)       ROS:  General:  (-)Pain, (-)Decrease appeite, (-)Fevers, (-)Chills, (-)Weight Loss  Eyes: (-)Blurry Vision, (-)Double Vision, (-)Vision Loss  ENT: (-)Sinus Congestion, (-)Decreased Hearing, (-)Nosebleeds, (-)Sore Throat  Cardiac: (-)Chest Pain, (-)Palpitations, (-)Shortness of breathe on exertion  Respiratory: (-)Cough, (-)hemoptysis, (-)Shortness of breathe  GI: (+)Nausea, abdominal pain+  : (-)Hematuria, (-)Dysuria, (-)Polyuia  MSK: (-)Back pain, (-)Joint pain, (-)Stiffness  Dermatology: (-)Rash, (-)Itching  Neurology:  (-)Numbness, (-)Tingling, (-)Difficulty Walking, (-)Tremors, (+)Weakness  Psych: (-)Anxiety, (-)Depression, (-)Memory Loss  Hematology:  (-)Easy Bruising, (-)Easy Bleeding, (-)Night Sweats.     Objective  Vital Signs Last 24 Hrs  T(C): 36.9 (27 Jul 2019 05:07), Max: 37 (26 Jul 2019 21:23)  T(F): 98.4 (27 Jul 2019 05:07), Max: 98.6 (26 Jul 2019 21:23)  HR: 87 (27 Jul 2019 05:07) (85 - 97)  BP: 144/95 (27 Jul 2019 05:07) (108/76 - 144/95)  BP(mean): --  RR: 18 (27 Jul 2019 05:07) (18 - 18)  SpO2: 96% (27 Jul 2019 05:07) (96% - 97%)    Physical Exam:  General: AAO x 3. NAD. Lying in bed comfortably.  HEENT: clear oropharynx, anicteric sclera, pink conjunctivae, EOMi. PERRLA  Cardiac: S1, S2 present. No audible murmurs. RRR  Lungs: CTA B/L.   Abdomen: Soft, Non-Tender, Distended. BS+  Extremities: No edema  Skin: No Rashes. No petechiae    Medications:  MEDICATIONS  (STANDING):  chlorhexidine 2% Cloths 1 Application(s) Topical daily  docusate sodium 100 milliGRAM(s) Oral two times a day  enoxaparin Injectable 80 milliGRAM(s) SubCutaneous every 12 hours  losartan 100 milliGRAM(s) Oral daily  polyethylene glycol 3350 17 Gram(s) Oral two times a day  senna 2 Tablet(s) Oral at bedtime    MEDICATIONS  (PRN):  acetaminophen   Tablet .. 650 milliGRAM(s) Oral every 6 hours PRN Temp greater or equal to 38C (100.4F), Mild Pain (1 - 3)  ondansetron Injectable 4 milliGRAM(s) IV Push every 6 hours PRN Nausea and/or Vomiting  oxyCODONE    5 mG/acetaminophen 325 mG 1 Tablet(s) Oral every 8 hours PRN Severe Pain (7 - 10)      Labs:    07-26    136  |  97  |  5<L>  ----------------------------<  123<H>  3.7   |  25  |  0.64    Ca    9.3      26 Jul 2019 06:18  Mg     1.6     07-26      PT/INR - ( 27 Jul 2019 08:28 )   PT: 26.4 sec;   INR: 2.26 ratio

## 2019-07-27 NOTE — PROGRESS NOTE ADULT - SUBJECTIVE AND OBJECTIVE BOX
Hospitalist- Valdez Le MD  Pager: [phone]  If no response or off-hours, page [phone]  -------------------------------------  Patient is a 64y old  Female who presents with a chief complaint of abd pain (27 Jul 2019 13:18)  Subjective: No acute events overnight. Stomach distended but pain controlled. No fevers/chills.    14 point ros otherwise negative.     VITAL SIGNS:  Vital Signs Last 24 Hrs  T(C): 36.6 (27 Jul 2019 13:44), Max: 37 (26 Jul 2019 21:23)  T(F): 97.8 (27 Jul 2019 13:44), Max: 98.6 (26 Jul 2019 21:23)  HR: 78 (27 Jul 2019 13:44) (78 - 97)  BP: 108/78 (27 Jul 2019 13:44) (108/76 - 144/95)  BP(mean): --  RR: 18 (27 Jul 2019 13:44) (18 - 18)  SpO2: 98% (27 Jul 2019 13:44) (96% - 98%)      PHYSICAL EXAM:     GENERAL: no acute distress  HEENT: PERRLA, EOMI, moist oropharynx   RESPIRATORY: CTAB, no w/c   CARDIOVASCULAR: RRR, no murmurs, gallops, rubs  ABDOMINAL: soft, non-tender,+distended, +palpable mass, positive bowel sounds   EXTREMITIES: no clubbing, cyanosis, or edema  NEUROLOGICAL: alert and oriented x 3, non-focal  SKIN: no rashes or lesions   MUSCULOSKELETAL: no gross joint deformity      07-26    136  |  97  |  5<L>  ----------------------------<  123<H>  3.7   |  25  |  0.64    Ca    9.3      26 Jul 2019 06:18  Mg     1.6     07-26        CAPILLARY BLOOD GLUCOSE          MEDICATIONS  (STANDING):  chlorhexidine 2% Cloths 1 Application(s) Topical daily  docusate sodium 100 milliGRAM(s) Oral two times a day  enoxaparin Injectable 80 milliGRAM(s) SubCutaneous every 12 hours  losartan 100 milliGRAM(s) Oral daily  polyethylene glycol 3350 17 Gram(s) Oral two times a day  senna 2 Tablet(s) Oral at bedtime    MEDICATIONS  (PRN):  acetaminophen   Tablet .. 650 milliGRAM(s) Oral every 6 hours PRN Temp greater or equal to 38C (100.4F), Mild Pain (1 - 3)  ondansetron Injectable 4 milliGRAM(s) IV Push every 6 hours PRN Nausea and/or Vomiting  oxyCODONE    5 mG/acetaminophen 325 mG 1 Tablet(s) Oral every 8 hours PRN Severe Pain (7 - 10)

## 2019-07-27 NOTE — PROGRESS NOTE ADULT - PROBLEM SELECTOR PLAN 8
- DVT: on AC  - Diet: regular  - dispo: likely home with home hospice pending evaluation, pleurx placement, and lovenox bridge to coumadin - DVT: on AC  - Diet: regular  - dispo: likely home with home hospice pending evaluation, pleurx placement, and bridge to coumadin

## 2019-07-28 LAB
ANION GAP SERPL CALC-SCNC: 13 MMOL/L — SIGNIFICANT CHANGE UP (ref 5–17)
APTT BLD: 47.3 SEC — HIGH (ref 27.5–36.3)
BUN SERPL-MCNC: 6 MG/DL — LOW (ref 7–23)
CALCIUM SERPL-MCNC: 9 MG/DL — SIGNIFICANT CHANGE UP (ref 8.4–10.5)
CHLORIDE SERPL-SCNC: 100 MMOL/L — SIGNIFICANT CHANGE UP (ref 96–108)
CO2 SERPL-SCNC: 25 MMOL/L — SIGNIFICANT CHANGE UP (ref 22–31)
CREAT SERPL-MCNC: 0.7 MG/DL — SIGNIFICANT CHANGE UP (ref 0.5–1.3)
GLUCOSE SERPL-MCNC: 105 MG/DL — HIGH (ref 70–99)
HCT VFR BLD CALC: 34 % — LOW (ref 34.5–45)
HGB BLD-MCNC: 11 G/DL — LOW (ref 11.5–15.5)
INR BLD: 2.18 RATIO — HIGH (ref 0.88–1.16)
MCHC RBC-ENTMCNC: 29.3 PG — SIGNIFICANT CHANGE UP (ref 27–34)
MCHC RBC-ENTMCNC: 32.4 GM/DL — SIGNIFICANT CHANGE UP (ref 32–36)
MCV RBC AUTO: 90.4 FL — SIGNIFICANT CHANGE UP (ref 80–100)
PLATELET # BLD AUTO: 653 K/UL — HIGH (ref 150–400)
POTASSIUM SERPL-MCNC: 3.7 MMOL/L — SIGNIFICANT CHANGE UP (ref 3.5–5.3)
POTASSIUM SERPL-SCNC: 3.7 MMOL/L — SIGNIFICANT CHANGE UP (ref 3.5–5.3)
PROTHROM AB SERPL-ACNC: 25.7 SEC — HIGH (ref 10–13.1)
RBC # BLD: 3.76 M/UL — LOW (ref 3.8–5.2)
RBC # FLD: 15.5 % — HIGH (ref 10.3–14.5)
SODIUM SERPL-SCNC: 138 MMOL/L — SIGNIFICANT CHANGE UP (ref 135–145)
WBC # BLD: 9.66 K/UL — SIGNIFICANT CHANGE UP (ref 3.8–10.5)
WBC # FLD AUTO: 9.66 K/UL — SIGNIFICANT CHANGE UP (ref 3.8–10.5)

## 2019-07-28 PROCEDURE — 99233 SBSQ HOSP IP/OBS HIGH 50: CPT

## 2019-07-28 RX ORDER — OXYCODONE HYDROCHLORIDE 5 MG/1
5 TABLET ORAL ONCE
Refills: 0 | Status: DISCONTINUED | OUTPATIENT
Start: 2019-07-28 | End: 2019-07-28

## 2019-07-28 RX ADMIN — Medication 100 MILLIGRAM(S): at 05:11

## 2019-07-28 RX ADMIN — LOSARTAN POTASSIUM 100 MILLIGRAM(S): 100 TABLET, FILM COATED ORAL at 05:11

## 2019-07-28 RX ADMIN — POLYETHYLENE GLYCOL 3350 17 GRAM(S): 17 POWDER, FOR SOLUTION ORAL at 17:53

## 2019-07-28 RX ADMIN — OXYCODONE HYDROCHLORIDE 5 MILLIGRAM(S): 5 TABLET ORAL at 13:54

## 2019-07-28 RX ADMIN — OXYCODONE AND ACETAMINOPHEN 1 TABLET(S): 5; 325 TABLET ORAL at 11:22

## 2019-07-28 RX ADMIN — CHLORHEXIDINE GLUCONATE 1 APPLICATION(S): 213 SOLUTION TOPICAL at 13:12

## 2019-07-28 RX ADMIN — SENNA PLUS 2 TABLET(S): 8.6 TABLET ORAL at 21:12

## 2019-07-28 RX ADMIN — OXYCODONE AND ACETAMINOPHEN 1 TABLET(S): 5; 325 TABLET ORAL at 10:25

## 2019-07-28 RX ADMIN — OXYCODONE AND ACETAMINOPHEN 1 TABLET(S): 5; 325 TABLET ORAL at 22:37

## 2019-07-28 RX ADMIN — OXYCODONE HYDROCHLORIDE 5 MILLIGRAM(S): 5 TABLET ORAL at 14:35

## 2019-07-28 RX ADMIN — Medication 100 MILLIGRAM(S): at 17:53

## 2019-07-28 RX ADMIN — ONDANSETRON 4 MILLIGRAM(S): 8 TABLET, FILM COATED ORAL at 06:03

## 2019-07-28 RX ADMIN — OXYCODONE AND ACETAMINOPHEN 1 TABLET(S): 5; 325 TABLET ORAL at 23:30

## 2019-07-28 RX ADMIN — POLYETHYLENE GLYCOL 3350 17 GRAM(S): 17 POWDER, FOR SOLUTION ORAL at 05:11

## 2019-07-28 NOTE — PROGRESS NOTE ADULT - ASSESSMENT
64yoF w/ PMHx significant for PE (dx June 2019, on Eliquis), cerebral aneurysm s/p  shunt, HTN, metastatic GB carcinoma s/p lap cholecystectomy and partial liver resection on chemo (Capecitabine?), last dose 5 weeks ago, now p/w abd pain and distention likely secondary to worsening ascites from malignancy + chronic constipation.

## 2019-07-28 NOTE — PROGRESS NOTE ADULT - PROBLEM SELECTOR PLAN 8
- DVT: on AC  - Diet: regular  - Dispo: likely home with home hospice pending evaluation, Pleurex placement, and bridge to Coumadin

## 2019-07-28 NOTE — CHART NOTE - NSCHARTNOTEFT_GEN_A_CORE
Nutrition Note    Verbal consult received from RN regarding pt's supplement choices. Pt did not like the ensure clear, request to try Vanilla flavored supplements. RD provided samples of Health Shakes and Ensure Enlive supplements. Pt preferred the Health Shakes over the Ensure Enlive. Will provide Vanilla Health Shakes 3 x day. RD remains available.

## 2019-07-28 NOTE — PROGRESS NOTE ADULT - ATTENDING COMMENTS
Await cytology.  Diet as tolerated.  Medical oncology follow up.
Elizabeth Lewis M.D.  [facility] care
Elizabeth Hardwick MD  [facility]
Beeper: [phone]

## 2019-07-28 NOTE — PROGRESS NOTE ADULT - PROBLEM SELECTOR PLAN 2
Recently dx with lobar segmental PE on Eliquis; now found to have IVC thrombus despite Eliquis  - Lovenox bridge to Coumadin goal INR 2-3; holding Coumadin for procedure; would start Heparin Gtt if INR < 1.5  - continue monitoring daily INR  - pt requesting to continue INR checks with PCP Dr. Barton post discharge

## 2019-07-28 NOTE — PROGRESS NOTE ADULT - SUBJECTIVE AND OBJECTIVE BOX
Patient is a 64y old  Female who presents with a chief complaint of abd pain, ascities, progressive disease (28 Jul 2019 15:04)    SUBJECTIVE / OVERNIGHT EVENTS: Patient seen and examined. No acute overnight events; patient complaining of persisting abdominal pain, similar in quality to previous complaints, and typically controlled with her regimen of PO Percocet PRN except this morning.    OBJECTIVE:  Vital Signs Last 24 Hrs  T(F): 99.5 (28 Jul 2019 12:29), Max: 99.5 (28 Jul 2019 12:29)  HR: 108 (28 Jul 2019 13:45) (94 - 108)  BP: 117/84 (28 Jul 2019 13:45) (117/84 - 135/85)  RR: 18 (28 Jul 2019 12:29) (18 - 18)  SpO2: 96% (28 Jul 2019 13:45) (96% - 97%)    I&O's Summary  27 Jul 2019 07:01  -  28 Jul 2019 07:00  --------------------------------------------------------  IN: 480 mL / OUT: 0 mL / NET: 480 mL    Physical Examination:  GEN: middle aged woman, laying in bed in NAD  PSYCH: A&Ox3, mood and affect appear appropriate   RESPI: no accessory muscle use, B/L air entry, CTAB   CARDIO: regular rate/rhythm, no LE edema B/L  ABD: soft, NT, ND, +BS  EXT: patient able to move all extremities spontaneously  VASC: peripheral pulses palpated    Labs:                     11.0   9.66  )-----------( 653      ( 28 Jul 2019 09:03 )             34.0     07-28  138  |  100  |  6<L>  ----------------------------<  105<H>  3.7   |  25  |  0.70    Ca    9.0      28 Jul 2019 06:17    PT/INR - ( 28 Jul 2019 08:41 )   PT: 25.7 sec;   INR: 2.18 ratio    PTT - ( 28 Jul 2019 08:41 )  PTT:47.3 sec    Consultant(s) Notes Reviewed: Heme-Onc  Care Discussed with Consultants/Other Providers: VANESSA Herrera    MEDICATIONS  (STANDING):  chlorhexidine 2% Cloths 1 Application(s) Topical daily  docusate sodium 100 milliGRAM(s) Oral two times a day  losartan 100 milliGRAM(s) Oral daily  polyethylene glycol 3350 17 Gram(s) Oral two times a day  senna 2 Tablet(s) Oral at bedtime    MEDICATIONS  (PRN):  acetaminophen   Tablet .. 650 milliGRAM(s) Oral every 6 hours PRN Temp greater or equal to 38C (100.4F), Mild Pain (1 - 3)  ondansetron Injectable 4 milliGRAM(s) IV Push every 6 hours PRN Nausea and/or Vomiting  oxyCODONE    5 mG/acetaminophen 325 mG 1 Tablet(s) Oral every 8 hours PRN Severe Pain (7 - 10)

## 2019-07-28 NOTE — PROGRESS NOTE ADULT - SUBJECTIVE AND OBJECTIVE BOX
[facility] Hematology/Oncology - Ml Zhu / Joshua / Braulio - [phone]  Primary Outpatient Hematologist/Oncologist:     Subjective  Patient seen and examined. Lying in bed comfortably.  Pain is under control.     ROS:  General:  (-)Pain, (-)Decrease appeite, (-)Fevers, (-)Chills, (-)Weight Loss  Eyes: (-)Blurry Vision, (-)Double Vision, (-)Vision Loss  ENT: (-)Sinus Congestion, (-)Decreased Hearing, (-)Nosebleeds, (-)Sore Throat  Cardiac: (-)Chest Pain, (-)Palpitations, (-)Shortness of breathe on exertion  Respiratory: (-)Cough, (-)hemoptysis, (-)Shortness of breathe  GI: (-)Nausea, (-)Vomiting, (-)Diarrhea, (-)Constipation, (-)Melena, (-)BRBPR  : (-)Hematuria, (-)Dysuria, (-)Polyuia  MSK: (-)Back pain, (-)Joint pain, (-)Stiffness  Dermatology: (-)Rash, (-)Itching  Neurology:  (-)Numbness, (-)Tingling, (-)Difficulty Walking, (-)Tremors, (-)Weakness  Psych: (-)Anxiety, (-)Depression, (-)Memory Loss  Hematology:  (-)Easy Bruising, (-)Easy Bleeding, (-)Night Sweats.     Objective  Vital Signs Last 24 Hrs  T(C): 37.5 (28 Jul 2019 12:29), Max: 37.5 (28 Jul 2019 12:29)  T(F): 99.5 (28 Jul 2019 12:29), Max: 99.5 (28 Jul 2019 12:29)  HR: 108 (28 Jul 2019 13:45) (94 - 108)  BP: 117/84 (28 Jul 2019 13:45) (117/84 - 135/85)  BP(mean): --  RR: 18 (28 Jul 2019 12:29) (18 - 18)  SpO2: 96% (28 Jul 2019 13:45) (96% - 97%)    Physical Exam:  General: AAO x 3. NAD. Lying in bed comfortably.  HEENT: clear oropharynx, anicteric sclera, pink conjunctivae,   Cardiac: S1, S2 present. No audible murmurs. RRR  Lungs: CTA B/L.   Abdomen: Soft, Non-Tender, Distended. ascities +  Extremities: 2+ pulses. No edema  Skin: No Rashes. No petechiae  Neuro: No focal motor or sensory deficits.     Medications:  MEDICATIONS  (STANDING):  chlorhexidine 2% Cloths 1 Application(s) Topical daily  docusate sodium 100 milliGRAM(s) Oral two times a day  losartan 100 milliGRAM(s) Oral daily  polyethylene glycol 3350 17 Gram(s) Oral two times a day  senna 2 Tablet(s) Oral at bedtime    MEDICATIONS  (PRN):  acetaminophen   Tablet .. 650 milliGRAM(s) Oral every 6 hours PRN Temp greater or equal to 38C (100.4F), Mild Pain (1 - 3)  ondansetron Injectable 4 milliGRAM(s) IV Push every 6 hours PRN Nausea and/or Vomiting  oxyCODONE    5 mG/acetaminophen 325 mG 1 Tablet(s) Oral every 8 hours PRN Severe Pain (7 - 10)      Labs:                        11.0   9.66  )-----------( 653      ( 28 Jul 2019 09:03 )             34.0     07-28    138  |  100  |  6<L>  ----------------------------<  105<H>  3.7   |  25  |  0.70    Ca    9.0      28 Jul 2019 06:17      PT/INR - ( 28 Jul 2019 08:41 )   PT: 25.7 sec;   INR: 2.18 ratio         PTT - ( 28 Jul 2019 08:41 )  PTT:47.3 sec    Radiology:

## 2019-07-29 LAB
HCT VFR BLD CALC: 32.5 % — LOW (ref 34.5–45)
HGB BLD-MCNC: 10.7 G/DL — LOW (ref 11.5–15.5)
INR BLD: 1.71 RATIO — HIGH (ref 0.88–1.16)
MCHC RBC-ENTMCNC: 30.2 PG — SIGNIFICANT CHANGE UP (ref 27–34)
MCHC RBC-ENTMCNC: 32.9 GM/DL — SIGNIFICANT CHANGE UP (ref 32–36)
MCV RBC AUTO: 91.8 FL — SIGNIFICANT CHANGE UP (ref 80–100)
PLATELET # BLD AUTO: 663 K/UL — HIGH (ref 150–400)
PROTHROM AB SERPL-ACNC: 19.6 SEC — HIGH (ref 10–13.1)
RBC # BLD: 3.54 M/UL — LOW (ref 3.8–5.2)
RBC # FLD: 15.7 % — HIGH (ref 10.3–14.5)
WBC # BLD: 8.94 K/UL — SIGNIFICANT CHANGE UP (ref 3.8–10.5)
WBC # FLD AUTO: 8.94 K/UL — SIGNIFICANT CHANGE UP (ref 3.8–10.5)

## 2019-07-29 PROCEDURE — 99233 SBSQ HOSP IP/OBS HIGH 50: CPT

## 2019-07-29 PROCEDURE — 76705 ECHO EXAM OF ABDOMEN: CPT | Mod: 26

## 2019-07-29 RX ORDER — ENOXAPARIN SODIUM 100 MG/ML
80 INJECTION SUBCUTANEOUS ONCE
Refills: 0 | Status: COMPLETED | OUTPATIENT
Start: 2019-07-29 | End: 2019-07-29

## 2019-07-29 RX ADMIN — OXYCODONE AND ACETAMINOPHEN 1 TABLET(S): 5; 325 TABLET ORAL at 09:38

## 2019-07-29 RX ADMIN — OXYCODONE AND ACETAMINOPHEN 1 TABLET(S): 5; 325 TABLET ORAL at 20:10

## 2019-07-29 RX ADMIN — CHLORHEXIDINE GLUCONATE 1 APPLICATION(S): 213 SOLUTION TOPICAL at 11:33

## 2019-07-29 RX ADMIN — OXYCODONE AND ACETAMINOPHEN 1 TABLET(S): 5; 325 TABLET ORAL at 10:08

## 2019-07-29 RX ADMIN — POLYETHYLENE GLYCOL 3350 17 GRAM(S): 17 POWDER, FOR SOLUTION ORAL at 17:44

## 2019-07-29 RX ADMIN — SENNA PLUS 2 TABLET(S): 8.6 TABLET ORAL at 22:31

## 2019-07-29 RX ADMIN — ENOXAPARIN SODIUM 80 MILLIGRAM(S): 100 INJECTION SUBCUTANEOUS at 11:35

## 2019-07-29 RX ADMIN — ONDANSETRON 4 MILLIGRAM(S): 8 TABLET, FILM COATED ORAL at 17:44

## 2019-07-29 RX ADMIN — OXYCODONE AND ACETAMINOPHEN 1 TABLET(S): 5; 325 TABLET ORAL at 19:40

## 2019-07-29 RX ADMIN — LOSARTAN POTASSIUM 100 MILLIGRAM(S): 100 TABLET, FILM COATED ORAL at 05:28

## 2019-07-29 RX ADMIN — Medication 100 MILLIGRAM(S): at 17:44

## 2019-07-29 NOTE — PROGRESS NOTE ADULT - SUBJECTIVE AND OBJECTIVE BOX
[facility] Hematology/Oncology - Ml Zhu / Joshua / Braulio - [phone]  Primary Outpatient Hematologist/Oncologist: Dr. Edgar Ying    Subjective  Patient seen and examined. Sitting up in bed. Doing well. Had paracentesis last night. Tolerated well.     Admitting Complaint/History  HPI:  64F w/ PMH of PE dx June 2019 (on eliquis), cerebral aneurysm s/p  shunt, HTN, HLD, metastatic GB carcinoma s/p lap cholecystectomy and partial liver ressection, on chemo (capecitabine?), last dose 5 weeks ago, now p/w abd pain & distention.     Patient reports intermittent diffuse abdominal pain with associated nausea and NBNB emesis x 3 episodes, as well as decreased appetite for the past week. Reports constipation last BM Tuesday. 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 Was dx with PE at that time as well and started on Eliquis. Reports never fulling recovered since discharge last time. BM were mostly small pellets of fecal matters. Otherwise denies fever, chill, CP, diarrhea, urinary concerns. Reports mild SOB, especially with exertion over the last week as well.     In ED, VSS. CT scan demonstrated increased volume ascites. No SBO but possible IVC clot. Vascular surgery c/s. s/p 1L bolus, Tylenol, morphine and Zofran.    At the time of my examination. Patient reports 2/10 abd pain, and no longer feeling nauseated. (19 Jul 2019 02:04)   7/29 pt had a second paracentesis and will now have a catheter placed in but the procedure to be done after the inr is below the 2 range. Discussions on hospice will be forthcoming.  ROS:  General:  (-)Pain, (-)Decrease appeite, (-)Fevers, (-)Chills, (-)Weight Loss  Eyes: (-)Blurry Vision, (-)Double Vision, (-)Vision Loss  ENT: (-)Sinus Congestion, (-)Decreased Hearing, (-)Nosebleeds, (-)Sore Throat  Cardiac: (-)Chest Pain, (-)Palpitations, (-)Shortness of breathe on exertion  Respiratory: (-)Cough, (-)hemoptysis, (-)Shortness of breathe  GI: (-)Nausea, (-)Vomiting, (-)Diarrhea, (-)Constipation, (-)Melena, (-)BRBPR  : (-)Hematuria, (-)Dysuria, (-)Polyuia  MSK: (-)Back pain, (-)Joint pain, (-)Stiffness  Dermatology: (-)Rash, (-)Itching  Neurology:  (-)Numbness, (-)Tingling, (-)Difficulty Walking, (-)Tremors, (-)Weakness  Psych: (-)Anxiety, (-)Depression, (-)Memory Loss  Hematology:  (-)Easy Bruising, (-)Easy Bleeding, (-)Night Sweats.     Objective  Vital Signs Last 24 Hrs  T(C): 36.8 (25 Jul 2019 04:56), Max: 37 (24 Jul 2019 21:00)  T(F): 98.2 (25 Jul 2019 04:56), Max: 98.6 (24 Jul 2019 21:00)  HR: 88 (25 Jul 2019 04:56) (88 - 98)  BP: 128/81 (25 Jul 2019 04:56) (122/79 - 131/87)  RR: 18 (25 Jul 2019 04:56) (18 - 18)  SpO2: 95% (25 Jul 2019 04:56) (95% - 98%)    Physical Exam:  General: AAO x 3. NAD. Lying in bed comfortably.  HEENT: clear oropharynx, anicteric sclera, pink conjunctivae, EOMi. PERRLA  Cardiac: S1, S2 present. No audible murmurs. RRR  Lungs: CTA B/L.   Abdomen: Soft, Non-Tender, Non-Distended. No palpable hepatosplenomegaly  Extremities: 2+ pulses. No edema  Skin: No Rashes. No petechiae  Neuro: No focal motor or sensory deficits.     Medications:  MEDICATIONS  (STANDING):  chlorhexidine 2% Cloths 1 Application(s) Topical daily  docusate sodium 100 milliGRAM(s) Oral two times a day  enoxaparin Injectable 80 milliGRAM(s) SubCutaneous every 12 hours  losartan 100 milliGRAM(s) Oral daily  polyethylene glycol 3350 17 Gram(s) Oral two times a day  senna 2 Tablet(s) Oral at bedtime    MEDICATIONS  (PRN):  acetaminophen   Tablet .. 650 milliGRAM(s) Oral every 6 hours PRN Temp greater or equal to 38C (100.4F), Mild Pain (1 - 3)  ondansetron Injectable 4 milliGRAM(s) IV Push every 6 hours PRN Nausea and/or Vomiting  oxyCODONE    5 mG/acetaminophen 325 mG 1 Tablet(s) Oral every 8 hours PRN Severe Pain (7 - 10)                          10.7   8.94  )-----------( 663      ( 29 Jul 2019 10:12 )             32.5     07-25    139  |  100  |  5<L>  ----------------------------<  105<H>  3.2<L>   |  27  |  0.68    Ca    9.0      25 Jul 2019 07:07      PT/INR - ( 25 Jul 2019 09:16 )   PT: 21.7 sec;   INR: 1.87 ratio

## 2019-07-29 NOTE — PROGRESS NOTE ADULT - ASSESSMENT
Patient with metastatic gallbladder cancer admitted who has failed 1st line therapy seeking additional opinions admitted with increasing abdominal girth and new blood clot in the IVC. Patient is now on coumadin and is s/p 2 sessions of paracentesis.    7/29 pt had a second paracentesis and will now have a catheter placed in but the procedure to be done after the inr is below the 2 range. Discussions on hospice will be forthcoming.

## 2019-07-29 NOTE — PROGRESS NOTE ADULT - PROBLEM SELECTOR PLAN 2
Recently dx with lobar segmental PE on Eliquis; now found to have IVC thrombus despite Eliquis  - Lovenox bridge to Coumadin goal INR 2-3; holding Coumadin for procedure; will give one dose of lovenox today then hold x 24 hours prior to procedure  - continue monitoring daily INR  - pt requesting to continue INR checks with onc Dr. Zhu upon discharge

## 2019-07-29 NOTE — GOALS OF CARE CONVERSATION - PERSONAL ADVANCE DIRECTIVE - CONVERSATION DETAILS
[facility] RN Note    Meeting scheduled for 2pm today with pt and her daughter. Pts dtr was stuck in traffic and unable to make it in time for scheduled meeting. RN did visit pts room twice. Pt agreeable for RN to follow up tomorrow to attempt to reschedule another meeting.     Cal Lazar RN

## 2019-07-29 NOTE — PROGRESS NOTE ADULT - SUBJECTIVE AND OBJECTIVE BOX
Hospitalist- Valdez Le MD  Pager: [phone]  If no response or off-hours, page [phone]  -------------------------------------  Patient is a 64y old  Female who presents with a chief complaint of abd pain (28 Jul 2019 15:40)  Subjective: No acute events overnight. +abdominal pain after undergoing repeat abdominal US, now improved. No fevers/chills. No sob/cp    14 point ros otherwise negative.     VITAL SIGNS:  Vital Signs Last 24 Hrs  T(C): 36.9 (29 Jul 2019 04:55), Max: 36.9 (28 Jul 2019 21:09)  T(F): 98.4 (29 Jul 2019 04:55), Max: 98.5 (28 Jul 2019 21:09)  HR: 97 (29 Jul 2019 04:55) (93 - 108)  BP: 147/84 (29 Jul 2019 04:55) (117/84 - 147/84)  BP(mean): --  RR: 18 (29 Jul 2019 04:55) (18 - 18)  SpO2: 97% (29 Jul 2019 04:55) (96% - 97%)      PHYSICAL EXAM:     GENERAL: no acute distress  HEENT: PERRLA, EOMI, moist oropharynx   RESPIRATORY: CTAB, no w/c   CARDIOVASCULAR: RRR, no murmurs, gallops, rubs  ABDOMINAL: soft, non-tender,+distended, +palpable abdominal mass, positive bowel sounds   EXTREMITIES: no clubbing, cyanosis, or edema  NEUROLOGICAL: alert and oriented x 3, non-focal  SKIN: no rashes or lesions   MUSCULOSKELETAL: no gross joint deformity                          10.7   8.94  )-----------( 663      ( 29 Jul 2019 10:12 )             32.5     07-28    138  |  100  |  6<L>  ----------------------------<  105<H>  3.7   |  25  |  0.70    Ca    9.0      28 Jul 2019 06:17        CAPILLARY BLOOD GLUCOSE          MEDICATIONS  (STANDING):  chlorhexidine 2% Cloths 1 Application(s) Topical daily  docusate sodium 100 milliGRAM(s) Oral two times a day  losartan 100 milliGRAM(s) Oral daily  polyethylene glycol 3350 17 Gram(s) Oral two times a day  senna 2 Tablet(s) Oral at bedtime    MEDICATIONS  (PRN):  acetaminophen   Tablet .. 650 milliGRAM(s) Oral every 6 hours PRN Temp greater or equal to 38C (100.4F), Mild Pain (1 - 3)  ondansetron Injectable 4 milliGRAM(s) IV Push every 6 hours PRN Nausea and/or Vomiting  oxyCODONE    5 mG/acetaminophen 325 mG 1 Tablet(s) Oral every 8 hours PRN Severe Pain (7 - 10)

## 2019-07-30 LAB
APPEARANCE UR: CLEAR — SIGNIFICANT CHANGE UP
BACTERIA # UR AUTO: NEGATIVE — SIGNIFICANT CHANGE UP
BILIRUB UR-MCNC: NEGATIVE — SIGNIFICANT CHANGE UP
COLOR SPEC: YELLOW — SIGNIFICANT CHANGE UP
DIFF PNL FLD: NEGATIVE — SIGNIFICANT CHANGE UP
EPI CELLS # UR: 2 /HPF — SIGNIFICANT CHANGE UP (ref 0–5)
GLUCOSE UR QL: NEGATIVE — SIGNIFICANT CHANGE UP
HCT VFR BLD CALC: 31.1 % — LOW (ref 34.5–45)
HGB BLD-MCNC: 10.4 G/DL — LOW (ref 11.5–15.5)
HYALINE CASTS # UR AUTO: 1 /LPF — SIGNIFICANT CHANGE UP (ref 0–7)
INR BLD: 1.34 RATIO — HIGH (ref 0.88–1.16)
KETONES UR-MCNC: NEGATIVE — SIGNIFICANT CHANGE UP
LEUKOCYTE ESTERASE UR-ACNC: ABNORMAL
MCHC RBC-ENTMCNC: 30.5 PG — SIGNIFICANT CHANGE UP (ref 27–34)
MCHC RBC-ENTMCNC: 33.4 GM/DL — SIGNIFICANT CHANGE UP (ref 32–36)
MCV RBC AUTO: 91.2 FL — SIGNIFICANT CHANGE UP (ref 80–100)
NITRITE UR-MCNC: NEGATIVE — SIGNIFICANT CHANGE UP
PH UR: 6.5 — SIGNIFICANT CHANGE UP (ref 5–8)
PLATELET # BLD AUTO: 700 K/UL — HIGH (ref 150–400)
PROT UR-MCNC: SIGNIFICANT CHANGE UP
PROTHROM AB SERPL-ACNC: 15.3 SEC — HIGH (ref 10–13.1)
RBC # BLD: 3.41 M/UL — LOW (ref 3.8–5.2)
RBC # FLD: 15.8 % — HIGH (ref 10.3–14.5)
RBC CASTS # UR COMP ASSIST: 5 /HPF — HIGH (ref 0–4)
SP GR SPEC: 1.02 — SIGNIFICANT CHANGE UP (ref 1.01–1.02)
UROBILINOGEN FLD QL: SIGNIFICANT CHANGE UP
WBC # BLD: 9.17 K/UL — SIGNIFICANT CHANGE UP (ref 3.8–10.5)
WBC # FLD AUTO: 9.17 K/UL — SIGNIFICANT CHANGE UP (ref 3.8–10.5)
WBC UR QL: 8 /HPF — HIGH (ref 0–5)

## 2019-07-30 PROCEDURE — 49418 INSERT TUN IP CATH PERC: CPT

## 2019-07-30 PROCEDURE — 99233 SBSQ HOSP IP/OBS HIGH 50: CPT

## 2019-07-30 RX ORDER — OXYCODONE AND ACETAMINOPHEN 5; 325 MG/1; MG/1
1 TABLET ORAL ONCE
Refills: 0 | Status: DISCONTINUED | OUTPATIENT
Start: 2019-07-30 | End: 2019-07-30

## 2019-07-30 RX ORDER — ENOXAPARIN SODIUM 100 MG/ML
80 INJECTION SUBCUTANEOUS EVERY 12 HOURS
Refills: 0 | Status: DISCONTINUED | OUTPATIENT
Start: 2019-07-31 | End: 2019-07-31

## 2019-07-30 RX ORDER — WARFARIN SODIUM 2.5 MG/1
4 TABLET ORAL ONCE
Refills: 0 | Status: COMPLETED | OUTPATIENT
Start: 2019-07-30 | End: 2019-07-30

## 2019-07-30 RX ORDER — ENOXAPARIN SODIUM 100 MG/ML
80 INJECTION SUBCUTANEOUS
Refills: 0 | Status: DISCONTINUED | OUTPATIENT
Start: 2019-07-30 | End: 2019-07-31

## 2019-07-30 RX ORDER — ACETAMINOPHEN 500 MG
1000 TABLET ORAL ONCE
Refills: 0 | Status: COMPLETED | OUTPATIENT
Start: 2019-07-30 | End: 2019-07-30

## 2019-07-30 RX ADMIN — OXYCODONE AND ACETAMINOPHEN 1 TABLET(S): 5; 325 TABLET ORAL at 18:15

## 2019-07-30 RX ADMIN — Medication 400 MILLIGRAM(S): at 21:47

## 2019-07-30 RX ADMIN — LOSARTAN POTASSIUM 100 MILLIGRAM(S): 100 TABLET, FILM COATED ORAL at 06:08

## 2019-07-30 RX ADMIN — Medication 650 MILLIGRAM(S): at 02:14

## 2019-07-30 RX ADMIN — SENNA PLUS 2 TABLET(S): 8.6 TABLET ORAL at 21:55

## 2019-07-30 RX ADMIN — WARFARIN SODIUM 4 MILLIGRAM(S): 2.5 TABLET ORAL at 21:55

## 2019-07-30 RX ADMIN — Medication 650 MILLIGRAM(S): at 02:45

## 2019-07-30 RX ADMIN — Medication 100 MILLIGRAM(S): at 06:08

## 2019-07-30 RX ADMIN — POLYETHYLENE GLYCOL 3350 17 GRAM(S): 17 POWDER, FOR SOLUTION ORAL at 17:46

## 2019-07-30 RX ADMIN — OXYCODONE AND ACETAMINOPHEN 1 TABLET(S): 5; 325 TABLET ORAL at 17:45

## 2019-07-30 RX ADMIN — Medication 1000 MILLIGRAM(S): at 22:30

## 2019-07-30 RX ADMIN — Medication 100 MILLIGRAM(S): at 17:46

## 2019-07-30 RX ADMIN — POLYETHYLENE GLYCOL 3350 17 GRAM(S): 17 POWDER, FOR SOLUTION ORAL at 06:08

## 2019-07-30 RX ADMIN — ENOXAPARIN SODIUM 80 MILLIGRAM(S): 100 INJECTION SUBCUTANEOUS at 21:55

## 2019-07-30 RX ADMIN — CHLORHEXIDINE GLUCONATE 1 APPLICATION(S): 213 SOLUTION TOPICAL at 14:24

## 2019-07-30 NOTE — PROGRESS NOTE ADULT - PROBLEM SELECTOR PLAN 2
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 Zhu upon discharge

## 2019-07-30 NOTE — PROGRESS NOTE ADULT - SUBJECTIVE AND OBJECTIVE BOX
Interventional Radiology Brief- Operative Note    Procedure: Peritoneal Pleurex placement    Operators: Clarissa    Anesthesia (type): MAC. 2% lidocaine local    Contrast:  none    EBL: none    Findings/Follow up Plan of Care: Peritoneal pleurex placed. Pelvic ascites is complex and cannot be drained. Pleurex placed into mid-upper abdomen and 2 L clear ascitic fluid removed. Pt tolerated procedure without difficulty Full report to follow.    Specimens Removed: none    Implants: as above    Complications: none    Condition/Disposition: stable / pacu    Please call Interventional Radiology x [phone] with any questions, concerns, or issues.

## 2019-07-30 NOTE — PROGRESS NOTE ADULT - SUBJECTIVE AND OBJECTIVE BOX
[facility] Hematology/Oncology - Ml Zhu / Joshua / Braulio - [phone]  Primary Outpatient Hematologist/Oncologist: Dr. Edgar Ying    Subjective  Patient seen and examined. Sitting up in bed. Doing well. Had paracentesis last night. Tolerated well.     Admitting Complaint/History  HPI:  64F w/ PMH of PE dx June 2019 (on eliquis), cerebral aneurysm s/p  shunt, HTN, HLD, metastatic GB carcinoma s/p lap cholecystectomy and partial liver ressection, on chemo (capecitabine?), last dose 5 weeks ago, now p/w abd pain & distention.     Patient reports intermittent diffuse abdominal pain with associated nausea and NBNB emesis x 3 episodes, as well as decreased appetite for the past week. Reports constipation last BM Tuesday. 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 Was dx with PE at that time as well and started on Eliquis. Reports never fulling recovered since discharge last time. BM were mostly small pellets of fecal matters. Otherwise denies fever, chill, CP, diarrhea, urinary concerns. Reports mild SOB, especially with exertion over the last week as well.     In ED, VSS. CT scan demonstrated increased volume ascites. No SBO but possible IVC clot. Vascular surgery c/s. s/p 1L bolus, Tylenol, morphine and Zofran.    At the time of my examination. Patient reports 2/10 abd pain, and no longer feeling nauseated. (19 Jul 2019 02:04)   7/29 pt had a second paracentesis and will now have a catheter placed in but the procedure to be done after the inr is below the 2 range. Discussions on hospice will be forthcoming. 7/30 the inr was 1.7 and pt was off for pigtail cath in the abdomen.  ROS:  General:  (-)Pain, (-)Decrease appeite, (-)Fevers, (-)Chills, (-)Weight Loss  Eyes: (-)Blurry Vision, (-)Double Vision, (-)Vision Loss  ENT: (-)Sinus Congestion, (-)Decreased Hearing, (-)Nosebleeds, (-)Sore Throat  Cardiac: (-)Chest Pain, (-)Palpitations, (-)Shortness of breathe on exertion  Respiratory: (-)Cough, (-)hemoptysis, (-)Shortness of breathe  GI: (-)Nausea, (-)Vomiting, (-)Diarrhea, (-)Constipation, (-)Melena, (-)BRBPR  : (-)Hematuria, (-)Dysuria, (-)Polyuia  MSK: (-)Back pain, (-)Joint pain, (-)Stiffness  Dermatology: (-)Rash, (-)Itching  Neurology:  (-)Numbness, (-)Tingling, (-)Difficulty Walking, (-)Tremors, (-)Weakness  Psych: (-)Anxiety, (-)Depression, (-)Memory Loss  Hematology:  (-)Easy Bruising, (-)Easy Bleeding, (-)Night Sweats.   ICU Vital Signs Last 24 Hrs  T(C): 36.8 (30 Jul 2019 05:00), Max: 36.9 (29 Jul 2019 21:41)  T(F): 98.2 (30 Jul 2019 05:00), Max: 98.4 (29 Jul 2019 21:41)  HR: 87 (30 Jul 2019 05:00) (87 - 95)  BP: 130/86 (30 Jul 2019 05:00) (113/76 - 137/82)  BP(mean): --  ABP: --  ABP(mean): --  RR: 18 (30 Jul 2019 05:00) (18 - 18)  SpO2: 96% (30 Jul 2019 05:00) (95% - 97%)    Physical Exam:  General: AAO x 3. NAD. Lying in bed comfortably.  HEENT: clear oropharynx, anicteric sclera, pink conjunctivae, EOMi. PERRLA  Cardiac: S1, S2 present. No audible murmurs. RRR  Lungs: CTA B/L.   Abdomen: Soft, Non-Tender, Non-Distended. No palpable hepatosplenomegaly  Extremities: 2+ pulses. No edema  Skin: No Rashes. No petechiae  Neuro: No focal motor or sensory deficits.     Medications:  MEDICATIONS  (STANDING):  chlorhexidine 2% Cloths 1 Application(s) Topical daily  docusate sodium 100 milliGRAM(s) Oral two times a day  enoxaparin Injectable 80 milliGRAM(s) SubCutaneous every 12 hours  losartan 100 milliGRAM(s) Oral daily  polyethylene glycol 3350 17 Gram(s) Oral two times a day  senna 2 Tablet(s) Oral at bedtime    MEDICATIONS  (PRN):  acetaminophen   Tablet .. 650 milliGRAM(s) Oral every 6 hours PRN Temp greater or equal to 38C (100.4F), Mild Pain (1 - 3)  ondansetron Injectable 4 milliGRAM(s) IV Push every 6 hours PRN Nausea and/or Vomiting  oxyCODONE    5 mG/acetaminophen 325 mG 1 Tablet(s) Oral every 8 hours PRN Severe Pain (7 - 10)                          10.7   8.94  )-----------( 663      ( 29 Jul 2019 10:12 )             32.5     07-25    139  |  100  |  5<L>  ----------------------------<  105<H>  3.2<L>   |  27  |  0.68    Ca    9.0      25 Jul 2019 07:07      PT/INR - ( 30 Jul 2019 09:26 )   PT: 15.3 sec;   INR: 1.34 ratio

## 2019-07-30 NOTE — PROGRESS NOTE ADULT - ASSESSMENT
Patient with metastatic gallbladder cancer admitted who has failed 1st line therapy seeking additional opinions admitted with increasing abdominal girth and new blood clot in the IVC. Patient is now on coumadin and is s/p 2 sessions of paracentesis.    7/29 pt had a second paracentesis and will now have a catheter placed in but the procedure to be done after the inr is below the 2 range. Discussions on hospice will be forthcoming.   7/30 the inr was 1.7 and pt was off for pigtail cath in the abdomen.

## 2019-07-30 NOTE — GOALS OF CARE CONVERSATION - PERSONAL ADVANCE DIRECTIVE - CONVERSATION DETAILS
[facility] RN Note    Met with pt and her daughter, discussed hospice services.  Pt and dtr wishes to discuss hospice services with each other and asked that RN follow up with them on Thursday.      Cal Lazar RN

## 2019-07-30 NOTE — PROGRESS NOTE ADULT - SUBJECTIVE AND OBJECTIVE BOX
Interventional Radiology Pre-Procedure Note    This is a 64y Female with recurrent ascites presenting for tunneled ascites drainage catheter. Pt is  planned for home palliative care as per primary team.     Procedure: Abdominal pleurex    Diagnosis/Indication: Patient is a 64y old  Female who presents with a chief complaint of abd pain (29 Jul 2019 13:51)      PAST MEDICAL & SURGICAL HISTORY:  Malignant neoplasm of gallbladder  History of osteoarthritis  Gallstones: dx: &#x27; 2015  Hypercholesterolemia: Borderline. Diet-managed  Multiparity  Hypertension  Vitamin D deficiency  Cerebral aneurysm: &#x27; 2009:  surgically treated  History of cholecystectomy: 12/16  S/P ventricular shunt placement: &#x27; 2009  S/P cerebral aneurysm operation: &#x27; 2009:  Clipping of Cerebral Aneurysm       Female    Allergies: No Known Allergies      LABS:  CBC Full  -  ( 30 Jul 2019 08:31 )  WBC Count : 9.17 K/uL  RBC Count : 3.41 M/uL  Hemoglobin : 10.4 g/dL  Hematocrit : 31.1 %  Platelet Count - Automated : 700 K/uL  Mean Cell Volume : 91.2 fl  Mean Cell Hemoglobin : 30.5 pg  Mean Cell Hemoglobin Concentration : 33.4 gm/dL  Auto Neutrophil # : x  Auto Lymphocyte # : x  Auto Monocyte # : x  Auto Eosinophil # : x  Auto Basophil # : x  Auto Neutrophil % : x  Auto Lymphocyte % : x  Auto Monocyte % : x  Auto Eosinophil % : x  Auto Basophil % : x          PT/INR - ( 30 Jul 2019 09:26 )   PT: 15.3 sec;   INR: 1.34 ratio           A/P Abdominal pleurex catheter insertion.  Procedure/ risks/ benefits were explained, informed consent obtained from patient, verbalizes understanding. Interventional Radiology Pre-Procedure Note    This is a 64y Female with recurrent ascites presenting for tunneled ascites drainage catheter. Pt is  planned for home palliative care as per primary team.     Procedure: Abdominal pleurex    Diagnosis/Indication: Patient is a 64y old  Female who presents with a chief complaint of abd pain (29 Jul 2019 13:51)      PAST MEDICAL & SURGICAL HISTORY:  Malignant neoplasm of gallbladder  History of osteoarthritis  Gallstones: dx: &#x27; 2015  Hypercholesterolemia: Borderline. Diet-managed  Multiparity  Hypertension  Vitamin D deficiency  Cerebral aneurysm: &#x27; 2009:  surgically treated  History of cholecystectomy: 12/16  S/P ventricular shunt placement: &#x27; 2009  S/P cerebral aneurysm operation: &#x27; 2009:  Clipping of Cerebral Aneurysm       Female    Allergies: No Known Allergies      LABS:  CBC Full  -  ( 30 Jul 2019 08:31 )  WBC Count : 9.17 K/uL  RBC Count : 3.41 M/uL  Hemoglobin : 10.4 g/dL  Hematocrit : 31.1 %  Platelet Count - Automated : 700 K/uL  Mean Cell Volume : 91.2 fl  Mean Cell Hemoglobin : 30.5 pg  Mean Cell Hemoglobin Concentration : 33.4 gm/dL  Auto Neutrophil # : x  Auto Lymphocyte # : x  Auto Monocyte # : x  Auto Eosinophil # : x  Auto Basophil # : x  Auto Neutrophil % : x  Auto Lymphocyte % : x  Auto Monocyte % : x  Auto Eosinophil % : x  Auto Basophil % : x          PT/INR - ( 30 Jul 2019 09:26 )   PT: 15.3 sec;   INR: 1.34 ratio           A/P Abdominal pleurex catheter insertion.  Pt has complex ascites and tunneled catheter will not be able to completely decompress the abdomen. This has been discussed with the patient and primary team. Procedure/ risks/ benefits were explained, informed consent obtained from patient, verbalizes understanding.

## 2019-07-30 NOTE — PROGRESS NOTE ADULT - SUBJECTIVE AND OBJECTIVE BOX
Hospitalist- Valdez Le MD  Pager: [phone]  If no response or off-hours, page [phone]  -------------------------------------  Patient is a 64y old  Female who presents with a chief complaint of abd pain (30 Jul 2019 13:17)  Subjective: No acute events overnight. Underwent LUQ pleurx placement for ascites management. Reports some mild L sided abd pain, controlled on medications. Abdomen otherwise feels softer. No fevers/chills, no sob/cp/palpitations.    14 point ros otherwise negative.     VITAL SIGNS:  Vital Signs Last 24 Hrs  T(C): 36.7 (30 Jul 2019 15:00), Max: 36.9 (29 Jul 2019 21:41)  T(F): 98 (30 Jul 2019 15:00), Max: 98.4 (29 Jul 2019 21:41)  HR: 89 (30 Jul 2019 15:00) (85 - 94)  BP: 137/88 (30 Jul 2019 15:00) (127/83 - 137/88)  BP(mean): --  RR: 18 (30 Jul 2019 15:00) (18 - 18)  SpO2: 98% (30 Jul 2019 15:00) (95% - 98%)      PHYSICAL EXAM:     GENERAL: no acute distress  HEENT: PERRLA, EOMI, moist oropharynx   RESPIRATORY: CTAB, no w/c   CARDIOVASCULAR: RRR, no murmurs, gallops, rubs  ABDOMINAL: soft, non-tender, +distended, positive bowel sounds , +LUQ pleurx site clean, nontender  EXTREMITIES: no clubbing, cyanosis, or edema  NEUROLOGICAL: alert and oriented x 3, non-focal  SKIN: no rashes or lesions   MUSCULOSKELETAL: no gross joint deformity                          10.4   9.17  )-----------( 700      ( 30 Jul 2019 08:31 )             31.1             CAPILLARY BLOOD GLUCOSE          MEDICATIONS  (STANDING):  chlorhexidine 2% Cloths 1 Application(s) Topical daily  docusate sodium 100 milliGRAM(s) Oral two times a day  enoxaparin Injectable 80 milliGRAM(s) SubCutaneous <User Schedule>  losartan 100 milliGRAM(s) Oral daily  polyethylene glycol 3350 17 Gram(s) Oral two times a day  senna 2 Tablet(s) Oral at bedtime  warfarin 4 milliGRAM(s) Oral once    MEDICATIONS  (PRN):  acetaminophen   Tablet .. 650 milliGRAM(s) Oral every 6 hours PRN Temp greater or equal to 38C (100.4F), Mild Pain (1 - 3)  ondansetron Injectable 4 milliGRAM(s) IV Push every 6 hours PRN Nausea and/or Vomiting  oxyCODONE    5 mG/acetaminophen 325 mG 1 Tablet(s) Oral every 8 hours PRN Severe Pain (7 - 10)

## 2019-07-31 LAB
INR BLD: 1.46 RATIO — HIGH (ref 0.88–1.16)
PROTHROM AB SERPL-ACNC: 16.8 SEC — HIGH (ref 10–13.1)

## 2019-07-31 PROCEDURE — 99233 SBSQ HOSP IP/OBS HIGH 50: CPT

## 2019-07-31 PROCEDURE — 99231 SBSQ HOSP IP/OBS SF/LOW 25: CPT

## 2019-07-31 RX ORDER — ENOXAPARIN SODIUM 100 MG/ML
80 INJECTION SUBCUTANEOUS EVERY 12 HOURS
Refills: 0 | Status: DISCONTINUED | OUTPATIENT
Start: 2019-07-31 | End: 2019-08-01

## 2019-07-31 RX ORDER — WARFARIN SODIUM 2.5 MG/1
4 TABLET ORAL ONCE
Refills: 0 | Status: COMPLETED | OUTPATIENT
Start: 2019-07-31 | End: 2019-07-31

## 2019-07-31 RX ADMIN — OXYCODONE AND ACETAMINOPHEN 1 TABLET(S): 5; 325 TABLET ORAL at 22:39

## 2019-07-31 RX ADMIN — OXYCODONE AND ACETAMINOPHEN 1 TABLET(S): 5; 325 TABLET ORAL at 12:10

## 2019-07-31 RX ADMIN — LOSARTAN POTASSIUM 100 MILLIGRAM(S): 100 TABLET, FILM COATED ORAL at 06:19

## 2019-07-31 RX ADMIN — CHLORHEXIDINE GLUCONATE 1 APPLICATION(S): 213 SOLUTION TOPICAL at 14:51

## 2019-07-31 RX ADMIN — Medication 100 MILLIGRAM(S): at 18:24

## 2019-07-31 RX ADMIN — POLYETHYLENE GLYCOL 3350 17 GRAM(S): 17 POWDER, FOR SOLUTION ORAL at 18:24

## 2019-07-31 RX ADMIN — OXYCODONE AND ACETAMINOPHEN 1 TABLET(S): 5; 325 TABLET ORAL at 21:39

## 2019-07-31 RX ADMIN — SENNA PLUS 2 TABLET(S): 8.6 TABLET ORAL at 21:39

## 2019-07-31 RX ADMIN — ENOXAPARIN SODIUM 80 MILLIGRAM(S): 100 INJECTION SUBCUTANEOUS at 21:39

## 2019-07-31 RX ADMIN — Medication 650 MILLIGRAM(S): at 06:59

## 2019-07-31 RX ADMIN — WARFARIN SODIUM 4 MILLIGRAM(S): 2.5 TABLET ORAL at 21:39

## 2019-07-31 RX ADMIN — OXYCODONE AND ACETAMINOPHEN 1 TABLET(S): 5; 325 TABLET ORAL at 11:23

## 2019-07-31 RX ADMIN — ENOXAPARIN SODIUM 80 MILLIGRAM(S): 100 INJECTION SUBCUTANEOUS at 06:19

## 2019-07-31 RX ADMIN — Medication 650 MILLIGRAM(S): at 07:30

## 2019-07-31 RX ADMIN — ONDANSETRON 4 MILLIGRAM(S): 8 TABLET, FILM COATED ORAL at 07:14

## 2019-07-31 RX ADMIN — Medication 100 MILLIGRAM(S): at 06:19

## 2019-07-31 NOTE — PROGRESS NOTE ADULT - SUBJECTIVE AND OBJECTIVE BOX
Hospitalist- Valdez Le MD  Pager: [phone]  If no response or off-hours, page [phone]  -------------------------------------  Patient is a 64y old  Female who presents with a chief complaint of abd pain (31 Jul 2019 09:58)  Subjective: No acute events overnight. Abd pain improved after repeat paracentesis. No fevers/chills, no sob/cough, cp.    14 point ros otherwise negative.     VITAL SIGNS:  Vital Signs Last 24 Hrs  T(C): 36.3 (31 Jul 2019 09:00), Max: 37 (30 Jul 2019 17:00)  T(F): 97.4 (31 Jul 2019 09:00), Max: 98.6 (30 Jul 2019 17:00)  HR: 118 (31 Jul 2019 09:00) (85 - 118)  BP: 111/72 (31 Jul 2019 09:00) (111/72 - 143/89)  BP(mean): --  RR: 18 (31 Jul 2019 09:00) (18 - 18)  SpO2: 97% (31 Jul 2019 09:00) (96% - 100%)      PHYSICAL EXAM:     GENERAL: no acute distress  HEENT: PERRLA, EOMI, moist oropharynx   RESPIRATORY: CTAB, no w/c   CARDIOVASCULAR: RRR, no murmurs, gallops, rubs  ABDOMINAL: soft, non-tender, +distended, positive bowel sounds , +pleurx site clean/intact  EXTREMITIES: no clubbing, cyanosis, or edema  NEUROLOGICAL: alert and oriented x 3, non-focal  SKIN: no rashes or lesions   MUSCULOSKELETAL: no gross joint deformity                          10.4   9.17  )-----------( 700      ( 30 Jul 2019 08:31 )             31.1             CAPILLARY BLOOD GLUCOSE          MEDICATIONS  (STANDING):  chlorhexidine 2% Cloths 1 Application(s) Topical daily  docusate sodium 100 milliGRAM(s) Oral two times a day  enoxaparin Injectable 80 milliGRAM(s) SubCutaneous every 12 hours  losartan 100 milliGRAM(s) Oral daily  polyethylene glycol 3350 17 Gram(s) Oral two times a day  senna 2 Tablet(s) Oral at bedtime    MEDICATIONS  (PRN):  acetaminophen   Tablet .. 650 milliGRAM(s) Oral every 6 hours PRN Temp greater or equal to 38C (100.4F), Mild Pain (1 - 3)  ondansetron Injectable 4 milliGRAM(s) IV Push every 6 hours PRN Nausea and/or Vomiting  oxyCODONE    5 mG/acetaminophen 325 mG 1 Tablet(s) Oral every 8 hours PRN Severe Pain (7 - 10)

## 2019-07-31 NOTE — PROGRESS NOTE ADULT - SUBJECTIVE AND OBJECTIVE BOX
Interventional Radiology    S/P peritoneal pleur-ex catheter placement, POD # 1.      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  As per primary team can plan for the pt to have ascites drained from the Pleur-ex catheter.    There is paper work that is placed in the patient's chart that can be filled out to apply for supplies (including drainage bottles) for the Pleur-ex catheter if the patient is going home with the catheter.  Maintain a dressing clean and dry over the peritoneal pleur-ex catheter at all times.      DONNELL Gale  [facility] [id number]  Ext [phone] Interventional Radiology    S/P peritoneal pleur-ex catheter placement, POD # 1.      The pt c/o left middle back pain 7/10.  She also c/o nausea and had one episode of vomiting earlier this morning.  She denies abdominal pain currently.  She stated that her abdomen feels better since she had ascites drained yesterday during the procedure in IR.      Vital Signs Last 24 Hrs  T(C): 36.6 (31 Jul 2019 05:39), Max: 37 (30 Jul 2019 17:00)  T(F): 97.8 (31 Jul 2019 05:39), Max: 98.6 (30 Jul 2019 17:00)  HR: 101 (31 Jul 2019 05:39) (85 - 102)  BP: 131/87 (31 Jul 2019 05:39) (120/77 - 143/89)  BP(mean): --  RR: 18 (31 Jul 2019 05:39) (18 - 18)  SpO2: 96% (31 Jul 2019 05:39) (96% - 100%)    General Appearance: NAD, pt seen in room sitting on chair.      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  Can plan for the pt to have ascites drained from the Pleur-ex catheter as per primary team recommendations.      There is paper work that is placed in the patient's chart that can be filled out to apply for supplies (including drainage bottles) for the Pleur-ex catheter if the patient is going home with the catheter.  Maintain a dressing clean and dry over the peritoneal pleur-ex catheter at all times.      Will d/w IR attending Dr. Clarissa Bradley, [facility]  [facility] [id number]  Ext [phone]

## 2019-07-31 NOTE — GOALS OF CARE CONVERSATION - PERSONAL ADVANCE DIRECTIVE - CONVERSATION DETAILS
[facility] RN Note    RN followed up with pt this morning. She stated that she has not made a decision regarding hospice, and will not make one until her daughter is at the bedside today. RN offered to call dtr to schedule time to meet but pt declined stating that she will call dtr. Pt stated that she will speak with dtr and have her come to hospital at 3pm today. Pt agreed for RN to come back to the room at 3:30pm to sign consents if they wanted to proceed with hospice.  RN visited pt room at 3:30 pm and spoke with pts dtr on pt phone. Dtr stated that she was on her way but her car broke down. RN advised her to call [facility] 24HR line and ask someone in the office to explain consents and she/daughter can sign and leave consents at the bedside when she gets to the hospital. Both pt and dtr were agreeable. Will follow up tomorrow.      Cal Lazar RN

## 2019-07-31 NOTE — PROGRESS NOTE ADULT - PROBLEM SELECTOR PLAN 2
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 Zhu upon discharge, however if accepted to home hospice, INR checks will be done by home hospice.

## 2019-07-31 NOTE — PROGRESS NOTE ADULT - ASSESSMENT
64yoF w/ PMHx significant for PE (dx June 2019, on Eliquis), cerebral aneurysm s/p  shunt, HTN, metastatic GB carcinoma s/p lap cholecystectomy and partial liver resection on chemo (Capecitabine?), last dose 5 weeks ago, now p/w abd pain and distention likely secondary to worsening ascites from malignancy + chronic constipation with possible element of congestion from newly found IVC thrombus

## 2019-07-31 NOTE — PROGRESS NOTE ADULT - SUBJECTIVE AND OBJECTIVE BOX
[facility] Hematology/Oncology - Ml Zhu / Joshua / Braulio - [phone]  Primary Outpatient Hematologist/Oncologist: Dr. Edgar Ying    Subjective  Patient seen and examined. Sitting up in bed. Doing well. Had paracentesis last night. Tolerated well.     Admitting Complaint/History  HPI:  64F w/ PMH of PE dx June 2019 (on eliquis), cerebral aneurysm s/p  shunt, HTN, HLD, metastatic GB carcinoma s/p lap cholecystectomy and partial liver ressection, on chemo (capecitabine?), last dose 5 weeks ago, now p/w abd pain & distention.     Patient reports intermittent diffuse abdominal pain with associated nausea and NBNB emesis x 3 episodes, as well as decreased appetite for the past week. Reports constipation last BM Tuesday. 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 Was dx with PE at that time as well and started on Eliquis. Reports never fulling recovered since discharge last time. BM were mostly small pellets of fecal matters. Otherwise denies fever, chill, CP, diarrhea, urinary concerns. Reports mild SOB, especially with exertion over the last week as well.     In ED, VSS. CT scan demonstrated increased volume ascites. No SBO but possible IVC clot. Vascular surgery c/s. s/p 1L bolus, Tylenol, morphine and Zofran.    At the time of my examination. Patient reports 2/10 abd pain, and no longer feeling nauseated. (19 Jul 2019 02:04)   7/29 pt had a second paracentesis and will now have a catheter placed in but the procedure to be done after the inr is below the 2 range. Discussions on hospice will be forthcoming. 7/30 the inr was 1.7 and pt was off for pigtail cath in the abdomen. 7/31 hospice conversations will be done today with daughter and pigtail doing well.  ROS:  General:  (-)Pain, (-)Decrease appeite, (-)Fevers, (-)Chills, (-)Weight Loss  Eyes: (-)Blurry Vision, (-)Double Vision, (-)Vision Loss  ENT: (-)Sinus Congestion, (-)Decreased Hearing, (-)Nosebleeds, (-)Sore Throat  Cardiac: (-)Chest Pain, (-)Palpitations, (-)Shortness of breathe on exertion  Respiratory: (-)Cough, (-)hemoptysis, (-)Shortness of breathe  GI: (-)Nausea, (-)Vomiting, (-)Diarrhea, (-)Constipation, (-)Melena, (-)BRBPR  : (-)Hematuria, (-)Dysuria, (-)Polyuia  MSK: (-)Back pain, (-)Joint pain, (-)Stiffness  Dermatology: (-)Rash, (-)Itching  Neurology:  (-)Numbness, (-)Tingling, (-)Difficulty Walking, (-)Tremors, (-)Weakness  Psych: (-)Anxiety, (-)Depression, (-)Memory Loss  Hematology:  (-)Easy Bruising, (-)Easy Bleeding, (-)Night Sweats.   ICU Vital Signs Last 24 Hrs  T(C): 36.8 (30 Jul 2019 05:00), Max: 36.9 (29 Jul 2019 21:41)  T(F): 98.2 (30 Jul 2019 05:00), Max: 98.4 (29 Jul 2019 21:41)  HR: 87 (30 Jul 2019 05:00) (87 - 95)  BP: 130/86 (30 Jul 2019 05:00) (113/76 - 137/82)  BP(mean): --  ABP: --  ABP(mean): --  RR: 18 (30 Jul 2019 05:00) (18 - 18)  SpO2: 96% (30 Jul 2019 05:00) (95% - 97%)    Physical Exam:  General: AAO x 3. NAD. Lying in bed comfortably.  HEENT: clear oropharynx, anicteric sclera, pink conjunctivae, EOMi. PERRLA  Cardiac: S1, S2 present. No audible murmurs. RRR  Lungs: CTA B/L.   Abdomen: Soft, Non-Tender, Non-Distended. No palpable hepatosplenomegaly  Extremities: 2+ pulses. No edema  Skin: No Rashes. No petechiae  Neuro: No focal motor or sensory deficits.   MEDICATIONS  (STANDING):  chlorhexidine 2% Cloths 1 Application(s) Topical daily  docusate sodium 100 milliGRAM(s) Oral two times a day  enoxaparin Injectable 80 milliGRAM(s) SubCutaneous every 12 hours  losartan 100 milliGRAM(s) Oral daily  polyethylene glycol 3350 17 Gram(s) Oral two times a day  senna 2 Tablet(s) Oral at bedtime  warfarin 4 milliGRAM(s) Oral once                        10.4   9.17  )-----------( 700      ( 30 Jul 2019 08:31 )             31.1     07-25    139  |  100  |  5<L>  ----------------------------<  105<H>  3.2<L>   |  27  |  0.68    Ca    9.0      25 Jul 2019 07:07      PT/INR - ( 30 Jul 2019 09:26 )   PT: 15.3 sec;   INR: 1.34 ratio

## 2019-07-31 NOTE — PROGRESS NOTE ADULT - PROBLEM SELECTOR PLAN 8
- DVT: on AC  - Diet: regular  - Dispo: likely home with home hospice pending evaluation, scheduled for today at 3pm.

## 2019-07-31 NOTE — PROGRESS NOTE ADULT - ASSESSMENT
Patient with metastatic gallbladder cancer admitted who has failed 1st line therapy seeking additional opinions admitted with increasing abdominal girth and new blood clot in the IVC. Patient is now on coumadin and is s/p 2 sessions of paracentesis.    7/29 pt had a second paracentesis and will now have a catheter placed in but the procedure to be done after the inr is below the 2 range. Discussions on hospice will be forthcoming.   7/30 the inr was 1.7 and pt was off for pigtail cath in the abdomen.  7/31 hospice conversations will be done today with daughter and pigtail doing well.

## 2019-08-01 VITALS
DIASTOLIC BLOOD PRESSURE: 78 MMHG | OXYGEN SATURATION: 97 % | SYSTOLIC BLOOD PRESSURE: 114 MMHG | TEMPERATURE: 98 F | HEART RATE: 106 BPM | RESPIRATION RATE: 18 BRPM

## 2019-08-01 LAB
ANION GAP SERPL CALC-SCNC: 15 MMOL/L — SIGNIFICANT CHANGE UP (ref 5–17)
BUN SERPL-MCNC: 9 MG/DL — SIGNIFICANT CHANGE UP (ref 7–23)
CALCIUM SERPL-MCNC: 9.3 MG/DL — SIGNIFICANT CHANGE UP (ref 8.4–10.5)
CHLORIDE SERPL-SCNC: 96 MMOL/L — SIGNIFICANT CHANGE UP (ref 96–108)
CO2 SERPL-SCNC: 25 MMOL/L — SIGNIFICANT CHANGE UP (ref 22–31)
CREAT SERPL-MCNC: 0.73 MG/DL — SIGNIFICANT CHANGE UP (ref 0.5–1.3)
GLUCOSE SERPL-MCNC: 167 MG/DL — HIGH (ref 70–99)
HCT VFR BLD CALC: 37.8 % — SIGNIFICANT CHANGE UP (ref 34.5–45)
HGB BLD-MCNC: 12 G/DL — SIGNIFICANT CHANGE UP (ref 11.5–15.5)
INR BLD: 1.8 RATIO — HIGH (ref 0.88–1.16)
MCHC RBC-ENTMCNC: 30 PG — SIGNIFICANT CHANGE UP (ref 27–34)
MCHC RBC-ENTMCNC: 31.8 GM/DL — LOW (ref 32–36)
MCV RBC AUTO: 94.3 FL — SIGNIFICANT CHANGE UP (ref 80–100)
PLATELET # BLD AUTO: 869 K/UL — HIGH (ref 150–400)
POTASSIUM SERPL-MCNC: 3.7 MMOL/L — SIGNIFICANT CHANGE UP (ref 3.5–5.3)
POTASSIUM SERPL-SCNC: 3.7 MMOL/L — SIGNIFICANT CHANGE UP (ref 3.5–5.3)
PROTHROM AB SERPL-ACNC: 20.9 SEC — HIGH (ref 10–12.9)
RBC # BLD: 4.01 M/UL — SIGNIFICANT CHANGE UP (ref 3.8–5.2)
RBC # FLD: 14.7 % — HIGH (ref 10.3–14.5)
SODIUM SERPL-SCNC: 136 MMOL/L — SIGNIFICANT CHANGE UP (ref 135–145)
WBC # BLD: 10.7 K/UL — HIGH (ref 3.8–10.5)
WBC # FLD AUTO: 10.7 K/UL — HIGH (ref 3.8–10.5)

## 2019-08-01 PROCEDURE — 82803 BLOOD GASES ANY COMBINATION: CPT

## 2019-08-01 PROCEDURE — C1729: CPT

## 2019-08-01 PROCEDURE — 82042 OTHER SOURCE ALBUMIN QUAN EA: CPT

## 2019-08-01 PROCEDURE — 93005 ELECTROCARDIOGRAM TRACING: CPT

## 2019-08-01 PROCEDURE — 71046 X-RAY EXAM CHEST 2 VIEWS: CPT

## 2019-08-01 PROCEDURE — 83690 ASSAY OF LIPASE: CPT

## 2019-08-01 PROCEDURE — 82947 ASSAY GLUCOSE BLOOD QUANT: CPT

## 2019-08-01 PROCEDURE — 86901 BLOOD TYPING SEROLOGIC RH(D): CPT

## 2019-08-01 PROCEDURE — 96375 TX/PRO/DX INJ NEW DRUG ADDON: CPT

## 2019-08-01 PROCEDURE — 88112 CYTOPATH CELL ENHANCE TECH: CPT

## 2019-08-01 PROCEDURE — 49083 ABD PARACENTESIS W/IMAGING: CPT

## 2019-08-01 PROCEDURE — 84157 ASSAY OF PROTEIN OTHER: CPT

## 2019-08-01 PROCEDURE — 83735 ASSAY OF MAGNESIUM: CPT

## 2019-08-01 PROCEDURE — 85027 COMPLETE CBC AUTOMATED: CPT

## 2019-08-01 PROCEDURE — 80048 BASIC METABOLIC PNL TOTAL CA: CPT

## 2019-08-01 PROCEDURE — 97116 GAIT TRAINING THERAPY: CPT

## 2019-08-01 PROCEDURE — 83615 LACTATE (LD) (LDH) ENZYME: CPT

## 2019-08-01 PROCEDURE — 99239 HOSP IP/OBS DSCHRG MGMT >30: CPT

## 2019-08-01 PROCEDURE — 82330 ASSAY OF CALCIUM: CPT

## 2019-08-01 PROCEDURE — 85014 HEMATOCRIT: CPT

## 2019-08-01 PROCEDURE — 96374 THER/PROPH/DIAG INJ IV PUSH: CPT | Mod: XU

## 2019-08-01 PROCEDURE — 88305 TISSUE EXAM BY PATHOLOGIST: CPT

## 2019-08-01 PROCEDURE — C1887: CPT

## 2019-08-01 PROCEDURE — 97161 PT EVAL LOW COMPLEX 20 MIN: CPT

## 2019-08-01 PROCEDURE — C1769: CPT

## 2019-08-01 PROCEDURE — 80053 COMPREHEN METABOLIC PANEL: CPT

## 2019-08-01 PROCEDURE — 76705 ECHO EXAM OF ABDOMEN: CPT

## 2019-08-01 PROCEDURE — 81001 URINALYSIS AUTO W/SCOPE: CPT

## 2019-08-01 PROCEDURE — 99285 EMERGENCY DEPT VISIT HI MDM: CPT | Mod: 25

## 2019-08-01 PROCEDURE — 86900 BLOOD TYPING SEROLOGIC ABO: CPT

## 2019-08-01 PROCEDURE — 82945 GLUCOSE OTHER FLUID: CPT

## 2019-08-01 PROCEDURE — 87070 CULTURE OTHR SPECIMN AEROBIC: CPT

## 2019-08-01 PROCEDURE — 89051 BODY FLUID CELL COUNT: CPT

## 2019-08-01 PROCEDURE — 83605 ASSAY OF LACTIC ACID: CPT

## 2019-08-01 PROCEDURE — 84295 ASSAY OF SERUM SODIUM: CPT

## 2019-08-01 PROCEDURE — 87075 CULTR BACTERIA EXCEPT BLOOD: CPT

## 2019-08-01 PROCEDURE — 84132 ASSAY OF SERUM POTASSIUM: CPT

## 2019-08-01 PROCEDURE — 80076 HEPATIC FUNCTION PANEL: CPT

## 2019-08-01 PROCEDURE — 86850 RBC ANTIBODY SCREEN: CPT

## 2019-08-01 PROCEDURE — 85730 THROMBOPLASTIN TIME PARTIAL: CPT

## 2019-08-01 PROCEDURE — 87205 SMEAR GRAM STAIN: CPT

## 2019-08-01 PROCEDURE — 49418 INSERT TUN IP CATH PERC: CPT

## 2019-08-01 PROCEDURE — 82435 ASSAY OF BLOOD CHLORIDE: CPT

## 2019-08-01 PROCEDURE — 84100 ASSAY OF PHOSPHORUS: CPT

## 2019-08-01 PROCEDURE — 74177 CT ABD & PELVIS W/CONTRAST: CPT

## 2019-08-01 PROCEDURE — 87086 URINE CULTURE/COLONY COUNT: CPT

## 2019-08-01 PROCEDURE — 85610 PROTHROMBIN TIME: CPT

## 2019-08-01 RX ORDER — ONDANSETRON 8 MG/1
1 TABLET, FILM COATED ORAL
Qty: 21 | Refills: 0
Start: 2019-08-01 | End: 2019-08-07

## 2019-08-01 RX ORDER — WARFARIN SODIUM 2.5 MG/1
2 TABLET ORAL
Qty: 60 | Refills: 0
Start: 2019-08-01 | End: 2019-08-30

## 2019-08-01 RX ADMIN — CHLORHEXIDINE GLUCONATE 1 APPLICATION(S): 213 SOLUTION TOPICAL at 12:11

## 2019-08-01 RX ADMIN — POLYETHYLENE GLYCOL 3350 17 GRAM(S): 17 POWDER, FOR SOLUTION ORAL at 05:37

## 2019-08-01 RX ADMIN — OXYCODONE AND ACETAMINOPHEN 1 TABLET(S): 5; 325 TABLET ORAL at 05:36

## 2019-08-01 RX ADMIN — LOSARTAN POTASSIUM 100 MILLIGRAM(S): 100 TABLET, FILM COATED ORAL at 05:37

## 2019-08-01 RX ADMIN — Medication 650 MILLIGRAM(S): at 10:41

## 2019-08-01 RX ADMIN — Medication 100 MILLIGRAM(S): at 05:37

## 2019-08-01 RX ADMIN — ONDANSETRON 4 MILLIGRAM(S): 8 TABLET, FILM COATED ORAL at 00:16

## 2019-08-01 RX ADMIN — ENOXAPARIN SODIUM 80 MILLIGRAM(S): 100 INJECTION SUBCUTANEOUS at 09:05

## 2019-08-01 RX ADMIN — Medication 650 MILLIGRAM(S): at 11:34

## 2019-08-01 RX ADMIN — OXYCODONE AND ACETAMINOPHEN 1 TABLET(S): 5; 325 TABLET ORAL at 06:36

## 2019-08-01 NOTE — PROGRESS NOTE ADULT - PROBLEM SELECTOR PROBLEM 3
IVC thrombosis
IVC thrombosis
Ascites
IVC thrombosis

## 2019-08-01 NOTE — PROGRESS NOTE ADULT - PROVIDER SPECIALTY LIST ADULT
Anesthesia
Heme/Onc
Hospitalist
Intervent Radiology
Surgery
Intervent Radiology
Heme/Onc
Heme/Onc
Hospitalist

## 2019-08-01 NOTE — PROGRESS NOTE ADULT - PROBLEM SELECTOR PLAN 7
- c/w home losartan and HCTZ with hold parameters
- c/w home losartan  -hold hctz given npo status
- c/w home losartan and HCTZ with hold parameters

## 2019-08-01 NOTE — PROGRESS NOTE ADULT - PROBLEM SELECTOR PROBLEM 6
Hydroureteronephrosis

## 2019-08-01 NOTE — PROGRESS NOTE ADULT - PROBLEM SELECTOR PROBLEM 1
Abdominal pain
Gallbladder neoplasm
Abdominal pain

## 2019-08-01 NOTE — PROGRESS NOTE ADULT - PROBLEM SELECTOR PLAN 4
on third cycle of chemo with capecitabine  - Per ED note, private oncology group already called, f/u recs
On third cycle of chemo with capecitabine  - discussed with onc Dr. Zhu- no further treatment options available.   - pt awaiting home hospice evaluation
On third cycle of chemo with capecitabine  - discussed with onc Dr. Zhu- no further treatment options available.   - pt awaiting home hospice evaluation
On third cycle of chemo with capecitabine  - discussed with onc Dr. Zhu- no further treatment options available.   - pt awaiting home hospice evaluation  - Pleurex drain for palliative management of recurrent ascites
On third cycle of chemo with capecitabine  - discussed with onc Dr. Zhu- no further treatment options available.   - pt awaiting home hospice evaluation  - Pleurex drain for palliative management of recurrent ascites likely tomorrow
On third cycle of chemo with capecitabine  - discussed with onc Dr. Zhu- no further treatment options available.   - pt declining hospice at this time
on third cycle of chemo with capecitabine  - I spoke with outpatient Dr Zhu.  He has introduced hospice in past due to severity of disease but patient has been resistant.   -oncology Dr Orellana to f/u with patient in hospital today
on third cycle of chemo with capecitabine  - Per ED note, private oncology group already called, f/u recs
on third cycle of chemo with capecitabine  - Per ED note, private oncology group already called, f/u recs  - consider palliative care consult for further GOC
on third cycle of chemo with capecitabine  - discussed with onc Dr. Zhu- no further treatment options available.   - patient amenable to home hospice evaluation, will call tomorrow.
on third cycle of chemo with capecitabine  - discussed with onc Dr. Zhu- no further treatment options available.   - pt awaiting home hospice evaluation  - pleurx drain for palliative management of recurrent ascites

## 2019-08-01 NOTE — PROGRESS NOTE ADULT - PROBLEM SELECTOR PLAN 1
no further treatment options available.   - pt awaiting home hospice evaluation  - pleurx drain for palliative management of recurrent ascites.
Associated with abd distention; likely 2/2 worsening ascites in the setting of malignancy + constipation; s/p tx paracentesis x 2  - CT A/P negative for SBO, and increased ascites  - plan for IR abdominal pleurex placement Monday for palliative ascites management  - c/w Tylenol PRN for mod/mod pain, and Percocet for severe pain  - PRN Zofran for nausea
Associated with abd distention; likely 2/2 worsening ascites in the setting of malignancy + constipation; s/p tx paracentesis x 2  - CT A/P negative for SBO, and increased ascites  - plan for IR abdominal pleurex placement Tomorrow for palliative ascites management- will give one dose of lovenox today  - c/w Tylenol PRN for mod/mod pain, and Percocet for severe pain  - PRN Zofran for nausea
Associated with abd distention; likely 2/2 worsening ascites in the setting of malignancy + constipation; s/p tx paracentesis x 2, now s/p pleurx placement  - CT A/P negative for SBO, and increased ascites  - lovenox bridge to coumadin  - c/w Tylenol PRN for mod/mod pain, and Percocet for severe pain  - PRN Zofran for nausea
Associated with abd distention; likely 2/2 worsening ascites in the setting of malignancy + constipation; s/p tx paracentesis x 2, now s/p pleurx placement  - CT A/P negative for SBO, and increased ascites  - lovenox bridge to coumadin  - c/w Tylenol PRN for mod/mod pain, and Percocet for severe pain  - PRN Zofran for nausea
Associated with abd distention; likely 2/2 worsening ascites in the setting of malignancy + constipation; s/p tx paracentesis x 2, now s/p pleurx placement  - CT A/P negative for SBO, and increased ascites  - will resume AC tonight- lovenox bridge to coumadin  - c/w Tylenol PRN for mod/mod pain, and Percocet for severe pain  - PRN Zofran for nausea
Metastatic disease which has not responded to conventional therapy  - Patient getting 2nd opinions from other facilities  - Follow up with oncologists at outside facilities and with Dr. Zhu
Metastatic disease which has not responded to conventional therapy  - Patient getting 2nd opinions from other facilities  - Follow up with oncologists at outside facilities and with Dr. Zhu.
w/ associated abd distention. Likely 2/2 worsening ascites in the setting malignancy + constipation. CT A/P negative for SBO, but shows increased ascites.  Afebrile and normal wbc, overall non toxic appearing.  - IR consulted for paracentesis  - Bowel regimen   -I spoke with Dr Aguirre (surg onc from prior admission)-to consult given lack of flatus/bm x days despite no radiographic evidence of bowel obstruction   - c/w Tylenol PRN for mod/mod pain, and percocet for severe pain  - PRN Zofran for nausea
w/ associated abd distention. Likely 2/2 worsening ascites in the setting malignancy + constipation. Now improved.  - CT A/P negative for SBO, and increased ascites  - s/p IR paracentesis of largest loculation of ascitic fluid- negative for SBP  - Bowel regimen after paracentesis   - c/w Tylenol PRN for mod/mod pain, and percocet for severe pain  - PRN Zofran for nausea
w/ associated abd distention. Likely 2/2 worsening ascites in the setting malignancy + constipation. Now improved.  - CT A/P negative for SBO, and increased ascites  - s/p IR paracentesis of largest loculation of ascitic fluid- negative for SBP  - Bowel regimen after paracentesis   - c/w Tylenol PRN for mod/mod pain, and percocet for severe pain  - PRN Zofran for nausea
w/ associated abd distention. Likely 2/2 worsening ascites in the setting malignancy + constipation. Now improved.  - CT A/P negative for SBO, and increased ascites  - s/p IR paracentesis of largest loculation of ascitic fluid- negative for SBP  - Bowel regimen after paracentesis   - c/w Tylenol PRN for mod/mod pain, and percocet for severe pain  - PRN Zofran for nausea  - daily EKG for qtc monitoring
w/ associated abd distention. Likely 2/2 worsening ascites in the setting malignancy + constipation. s/p 3L LVP.   - CT A/P negative for SBO, and increased ascites  - s/p IR paracentesis of largest loculation of ascitic fluid x 2- negative for SBP  - IR to repeat tap and place pleurx for palliative purposes on monday- ok to continue coumadin through the weekend and can give vit k for goal INR 1.5 on day of the procedure.  - Bowel regimen after paracentesis   - c/w Tylenol PRN for mod/mod pain, and percocet for severe pain  - PRN Zofran for nausea
w/ associated abd distention. Likely 2/2 worsening ascites in the setting malignancy + constipation. s/p 3L LVP.   - CT A/P negative for SBO, and increased ascites  - s/p IR paracentesis of largest loculation of ascitic fluid- negative for SBP  - will perform additional LVP today with IR- hold coumadin tonight and resume tomorrow if hb stable  - Bowel regimen after paracentesis   - c/w Tylenol PRN for mod/mod pain, and percocet for severe pain  - PRN Zofran for nausea
w/ associated abd distention. Likely 2/2 worsening ascites in the setting malignancy + constipation. s/p tx paracentesis x 2  - CT A/P negative for SBO, and increased ascites  - plan for IR abdominal pleurx placement monday for palliative ascites management  - c/w Tylenol PRN for mod/mod pain, and percocet for severe pain  - PRN Zofran for nausea
w/ associated abd distention. Likely 2/2 worsening ascites in the setting malignancy + constipation. Now improved.  - CT A/P negative for SBO, and increased ascites  - s/p IR paracentesis of largest loculation of ascitic fluid- negative for SBP  - Bowel regimen after paracentesis   - c/w Tylenol PRN for mod/mod pain, and percocet for severe pain  - PRN Zofran for nausea

## 2019-08-01 NOTE — PROGRESS NOTE ADULT - PROBLEM SELECTOR PROBLEM 4
Malignant neoplasm of gallbladder

## 2019-08-01 NOTE — PROGRESS NOTE ADULT - ASSESSMENT
Patient with metastatic gallbladder cancer admitted who has failed 1st line therapy seeking additional opinions admitted with increasing abdominal girth and new blood clot in the IVC. Patient is now on coumadin and is s/p 2 sessions of paracentesis.    7/29 pt had a second paracentesis and will now have a catheter placed in but the procedure to be done after the inr is below the 2 range. Discussions on hospice will be forthcoming.   7/30 the inr was 1.7 and pt was off for pigtail cath in the abdomen.  7/31 hospice conversations will be done today with daughter and pigtail doing well.   8/1 pt to go home today and visiting nurse to come to house for the drainage.

## 2019-08-01 NOTE — PROGRESS NOTE ADULT - PROBLEM SELECTOR PLAN 8
- DVT: on AC  - Diet: regular  - Dispo: likely home with home services today- abd pleurx to be monitored by Dr. Barton, INR checks to be followed by Dr. Toledo.    Total discharge time: 43 minutes. - DVT: on AC  - Diet: regular  - Dispo: likely home with home services today- abd pleurx to be monitored by Dr. Barton, INR checks to be followed by Dr. Toledo. Case discussed with both Dr. Zhu and Dr. Barton.    Total discharge time: 43 minutes.

## 2019-08-01 NOTE — PROGRESS NOTE ADULT - PROBLEM SELECTOR PLAN 2
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 Janine Ying

## 2019-08-01 NOTE — PROGRESS NOTE ADULT - SUBJECTIVE AND OBJECTIVE BOX
Hospitalist- Valdez Le MD  Pager: [phone]  If no response or off-hours, page [phone]  -------------------------------------  Patient is a 64y old  Female who presents with a chief complaint of abd pain (01 Aug 2019 12:36)  Subjective: No acute events overnight. Noted to be tachycardic yesterday, now resolved. At present, no palpitations, no sob/cp, no fevers/chills. Abd pain improved.     14 point ros otherwise negative.     VITAL SIGNS:  Vital Signs Last 24 Hrs  T(C): 36.9 (01 Aug 2019 14:25), Max: 36.9 (01 Aug 2019 05:00)  T(F): 98.4 (01 Aug 2019 14:25), Max: 98.5 (01 Aug 2019 05:00)  HR: 106 (01 Aug 2019 14:25) (94 - 106)  BP: 114/78 (01 Aug 2019 14:25) (114/78 - 132/87)  BP(mean): --  RR: 18 (01 Aug 2019 14:25) (16 - 18)  SpO2: 97% (01 Aug 2019 14:25) (97% - 97%)      PHYSICAL EXAM:     GENERAL: no acute distress  HEENT: PERRLA, EOMI, moist oropharynx   RESPIRATORY: CTAB, no w/c   CARDIOVASCULAR: RRR, no murmurs, gallops, rubs  ABDOMINAL: soft, non-tender,+distended, positive bowel sounds , +pleurx LUQ site clean  EXTREMITIES: no clubbing, cyanosis, or edema  NEUROLOGICAL: alert and oriented x 3, non-focal  SKIN: no rashes or lesions   MUSCULOSKELETAL: no gross joint deformity                          12.0   10.7  )-----------( 869      ( 01 Aug 2019 10:43 )             37.8     08-01    136  |  96  |  9   ----------------------------<  167<H>  3.7   |  25  |  0.73    Ca    9.3      01 Aug 2019 10:43        CAPILLARY BLOOD GLUCOSE          MEDICATIONS  (STANDING):  chlorhexidine 2% Cloths 1 Application(s) Topical daily  docusate sodium 100 milliGRAM(s) Oral two times a day  enoxaparin Injectable 80 milliGRAM(s) SubCutaneous every 12 hours  losartan 100 milliGRAM(s) Oral daily  polyethylene glycol 3350 17 Gram(s) Oral two times a day  senna 2 Tablet(s) Oral at bedtime    MEDICATIONS  (PRN):  acetaminophen   Tablet .. 650 milliGRAM(s) Oral every 6 hours PRN Temp greater or equal to 38C (100.4F), Mild Pain (1 - 3)  ondansetron Injectable 4 milliGRAM(s) IV Push every 6 hours PRN Nausea and/or Vomiting  oxyCODONE    5 mG/acetaminophen 325 mG 1 Tablet(s) Oral every 8 hours PRN Severe Pain (7 - 10)

## 2019-08-01 NOTE — PROGRESS NOTE ADULT - PROBLEM SELECTOR PROBLEM 5
Back pain

## 2019-08-01 NOTE — PROGRESS NOTE ADULT - PROBLEM SELECTOR PROBLEM 2
Pulmonary embolism
Ascites
Ascites
IVC thrombosis
Pulmonary embolism

## 2019-08-01 NOTE — PROGRESS NOTE ADULT - SUBJECTIVE AND OBJECTIVE BOX
ANESTHESIA POSTOP CHECK    64y Female POSTOP DAY 1     Vital Signs Last 24 Hrs  T(C): 36.9 (01 Aug 2019 05:00), Max: 36.9 (01 Aug 2019 05:00)  T(F): 98.5 (01 Aug 2019 05:00), Max: 98.5 (01 Aug 2019 05:00)  HR: 94 (01 Aug 2019 05:00) (94 - 114)  BP: 132/87 (01 Aug 2019 05:00) (116/77 - 132/87)  BP(mean): --  RR: 16 (01 Aug 2019 05:00) (16 - 18)  SpO2: 97% (01 Aug 2019 05:00) (96% - 97%)  I&O's Summary    31 Jul 2019 07:01  -  01 Aug 2019 07:00  --------------------------------------------------------  IN: 360 mL / OUT: 0 mL / NET: 360 mL        [x] NO APPARENT ANESTHESIA COMPLICATIONS

## 2019-08-01 NOTE — GOALS OF CARE CONVERSATION - PERSONAL ADVANCE DIRECTIVE - CONVERSATION DETAILS
[facility] RN Note    RN received telephone call from KRISTEL Quinteros stating that NP spoke with pt this morning and she does not want hospice at this time.     Cal Lazar RN

## 2019-08-01 NOTE — PROGRESS NOTE ADULT - PROBLEM SELECTOR PLAN 5
[facility] # [id number]  oxycodone-acetaminophen 5-325 mg tab, supply 90 for 23days, refilled July  - c/w home percocet for severe back pain
[facility] # [id number]  oxycodone-acetaminophen 5-325 mg tab, supply 90 for 23days, refilled July  - c/w home percocet for severe back pain
[facility] # [id number]: oxycodone-acetaminophen 5-325 mg tab, supply 90 for 23days, refilled in July  - c/w home Percocet for severe back pain  - will add Oxycodone IR 5mg PO STAT dose for breakthrough pain
[facility] # [id number]: oxycodone-acetaminophen 5-325 mg tab, supply 90 for 23days, refilled in July  - c/w home Percocet for severe back pain  - will add Oxycodone IR 5mg PO STAT dose for breakthrough pain
[facility] # [id number]: oxycodone-acetaminophen 5-325 mg tab, supply 90 for 23days, refilled in July  - c/w home Percocet for severe back pain  - will add Oxycodone IR 5mg PO STAT dose for breakthrough pain
[facility] # [id number]: oxycodone-acetaminophen 5-325 mg tab, supply 90 for 23days, refilled in July  - c/w home Percocet for severe back pain  - will add Oxycodone IR 5mg PO STAT dose for breakthrough pain
[facility] # [id number]: oxycodone-acetaminophen 5-325 mg tab, supply 90 for 23days, refilled in July  - c/w home Percocet for severe back pain  - will add Oxycodone IR 5mg PO STAT dose for breakthrough pain
[facility] # [id number]  oxycodone-acetaminophen 5-325 mg tab, supply 90 for 23days, refilled July  - c/w home percocet for severe back pain
[facility] # [id number]  oxycodone-acetaminophen 5-325 mg tab, supply 90 for 23days, refilled July  - c/w home percocet for severe back pain
[facility] # [id number]  oxycodone-acetaminophen 5-325 mg tab, supply 90 for 23days, refilled July  - c/w home percocet for severe back pain
[facility] # [id number]  oxycodone-acetaminophen 5-325 mg tab, supply 90 for 23days, refilled July  - c/w home percocet for severe back pain
[facility] # [id number]  oxycodone-acetaminophen 5-325 mg tab, supply 90 for 23days, refilled July  - c/w home percocet for severe back pain
[facility] # [id number]  oxycodone-acetaminophen 5-325 mg tab, supply 90 for 23days, refilled July  - c/w home percocet for severe back pain

## 2019-08-01 NOTE — PROGRESS NOTE ADULT - PROBLEM SELECTOR PLAN 3
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
On coumadin, INR therapeutic.  Hold tonight and restart after the catheter insertion.
Secondary to malignancy  - s/p paracentesis x 2  - GI follow up on discharge.
Secondary to malignancy. Was on eliquis at that time  - Failed NoAC  - Unable to afford Lovenox  - On coumadin. INR is therapeutic.
Suspicious IVC thrombus on CT scan  - vascular surgery consulted, heraclio recs - per vacular, no need for CT venogram  - AC as above
Suspicious IVC thrombus on CT scan  - vascular surgery consulted, heraclio recs - per vacular, no need for CT venogram  - AC as above- pt counseled on risks of AC including bleeding and will monitor abd pleurx for bloody output.
suspicious IVC thrombus on CT scan  - vascular surgery consulted, heraclio recs - per vacular, no need for CT venogram  - coumadin as above
suspicious IVC thrombus on CT scan  - vascular surgery consulted, heraclio recs - per vacular, no need for CT venogram  - will switch from eliquis to lovenox 80mg sc bid, will order teaching
suspicious IVC thrombus on CT scan  - vascular surgery consulted, heraclio recs - per vacular, no need for CT venogram  -f/u with oncology
suspicious IVC thrombus on CT scan  - vascular surgery consulted, heraclio recs - per vacular, no need for CT venogram  - AC as above

## 2019-08-01 NOTE — PROGRESS NOTE ADULT - SUBJECTIVE AND OBJECTIVE BOX
[facility] Hematology/Oncology - Ml Zhu / Joshua / Braulio - [phone]  Primary Outpatient Hematologist/Oncologist: Dr. Edgar Ying    Subjective  Patient seen and examined. Sitting up in bed. Doing well. Had paracentesis last night. Tolerated well.     Admitting Complaint/History  HPI:  64F w/ PMH of PE dx June 2019 (on eliquis), cerebral aneurysm s/p  shunt, HTN, HLD, metastatic GB carcinoma s/p lap cholecystectomy and partial liver ressection, on chemo (capecitabine?), last dose 5 weeks ago, now p/w abd pain & distention.     Patient reports intermittent diffuse abdominal pain with associated nausea and NBNB emesis x 3 episodes, as well as decreased appetite for the past week. Reports constipation last BM Tuesday. 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 Was dx with PE at that time as well and started on Eliquis. Reports never fulling recovered since discharge last time. BM were mostly small pellets of fecal matters. Otherwise denies fever, chill, CP, diarrhea, urinary concerns. Reports mild SOB, especially with exertion over the last week as well.     In ED, VSS. CT scan demonstrated increased volume ascites. No SBO but possible IVC clot. Vascular surgery c/s. s/p 1L bolus, Tylenol, morphine and Zofran.    At the time of my examination. Patient reports 2/10 abd pain, and no longer feeling nauseated. (19 Jul 2019 02:04)   7/29 pt had a second paracentesis and will now have a catheter placed in but the procedure to be done after the inr is below the 2 range. Discussions on hospice will be forthcoming. 7/30 the inr was 1.7 and pt was off for pigtail cath in the abdomen. 7/31 hospice conversations will be done today with daughter and pigtail doing well. 8/1 pt to go home today and visiting nurse to come to house for the drainage.  ROS:  General:  (-)Pain, (-)Decrease appeite, (-)Fevers, (-)Chills, (-)Weight Loss  Eyes: (-)Blurry Vision, (-)Double Vision, (-)Vision Loss  ENT: (-)Sinus Congestion, (-)Decreased Hearing, (-)Nosebleeds, (-)Sore Throat  Cardiac: (-)Chest Pain, (-)Palpitations, (-)Shortness of breathe on exertion  Respiratory: (-)Cough, (-)hemoptysis, (-)Shortness of breathe  GI: (-)Nausea, (-)Vomiting, (-)Diarrhea, (-)Constipation, (-)Melena, (-)BRBPR  : (-)Hematuria, (-)Dysuria, (-)Polyuia  MSK: (-)Back pain, (-)Joint pain, (-)Stiffness  Dermatology: (-)Rash, (-)Itching  Neurology:  (-)Numbness, (-)Tingling, (-)Difficulty Walking, (-)Tremors, (-)Weakness  Psych: (-)Anxiety, (-)Depression, (-)Memory Loss  Hematology:  (-)Easy Bruising, (-)Easy Bleeding, (-)Night Sweats.   ICU Vital Signs Last 24 Hrs  T(C): 36.8 (30 Jul 2019 05:00), Max: 36.9 (29 Jul 2019 21:41)  T(F): 98.2 (30 Jul 2019 05:00), Max: 98.4 (29 Jul 2019 21:41)  HR: 87 (30 Jul 2019 05:00) (87 - 95)  BP: 130/86 (30 Jul 2019 05:00) (113/76 - 137/82)  BP(mean): --  ABP: --  ABP(mean): --  RR: 18 (30 Jul 2019 05:00) (18 - 18)  SpO2: 96% (30 Jul 2019 05:00) (95% - 97%)    Physical Exam:  General: AAO x 3. NAD. Lying in bed comfortably.  HEENT: clear oropharynx, anicteric sclera, pink conjunctivae, EOMi. PERRLA  Cardiac: S1, S2 present. No audible murmurs. RRR  Lungs: CTA B/L.   Abdomen: Soft, Non-Tender, Non-Distended. No palpable hepatosplenomegaly  Extremities: 2+ pulses. No edema  Skin: No Rashes. No petechiae  Neuro: No focal motor or sensory deficits.   MEDICATIONS  (STANDING):  chlorhexidine 2% Cloths 1 Application(s) Topical daily  docusate sodium 100 milliGRAM(s) Oral two times a day  enoxaparin Injectable 80 milliGRAM(s) SubCutaneous every 12 hours  losartan 100 milliGRAM(s) Oral daily  polyethylene glycol 3350 17 Gram(s) Oral two times a day  senna 2 Tablet(s) Oral at bedtime  warfarin 4 milliGRAM(s) Oral once                        12.0   10.7  )-----------( 869      ( 01 Aug 2019 10:43 )             37.8   08-01    136  |  96  |  9   ----------------------------<  167<H>  3.7   |  25  |  0.73    Ca    9.3      01 Aug 2019 10:43        PT/INR - ( 30 Jul 2019 09:26 )   PT: 15.3 sec;   INR: 1.34 ratio

## 2019-09-13 NOTE — DISCHARGE NOTE NURSING/CASE MANAGEMENT/SOCIAL WORK - NSDCPEPTCAREGIVEDUMATLIST _GEN_ALL_CORE
Problem: Patient Care Overview  Goal: Plan of Care Review  Outcome: Ongoing (interventions implemented as appropriate)   09/13/19 7312   Coping/Psychosocial   Plan of Care Reviewed With patient   Plan of Care Review   Progress improving   OTHER   Outcome Summary Pt had uneventful shift. CBI continues @ slow rate. Urine is pink/yellow, clearing up, no visible clots. No complaints of pain. Had BM today. VSS. 3L NC, congested states he is improving. New PNA diagnosis today. Possible d/c Monday to [facility]. Will cont to monitor.           Coumadin/Warfarin

## 2020-02-26 NOTE — DIETITIAN INITIAL EVALUATION ADULT. - OBTAIN DAILY WEIGHT
February 26, 2020     Patient: Alberto Gibbons   YOB: 2006   Date of Visit: 2/26/2020       To Whom it May Concern:    Alberto Gibbons was seen in my clinic on 2/26/2020  Please excuse her from school on 2/27 She may return to school on 2/28 and when fever free for 24hrs   If you have any questions or concerns, please don't hesitate to call           Sincerely,          Archie Black PA-C        CC: No Recipients
yes

## 2020-04-02 NOTE — PATIENT PROFILE ADULT - HOME ACCESSIBILITY CONCERNS
- Continue supportive care  - Closely monitor daily CBC, CMP, Mg, Phos, CPK  - Monitor procalcitonin, CRP, ESR, ferritin, coags, D-dimer, LDH every 2-3 days   - s/p 5 days of Hydroxychloroquine (Plaquenil)   -   - enrolled in the regeneron trial none

## 2020-09-18 NOTE — DISCHARGE NOTE PROVIDER - HOSPITAL COURSE
Problem: Adult Behavioral Health Plan of Care  Goal: Plan of Care Review  Recent Flowsheet Documentation  Taken 9/18/2020 0832 by Radha Owen, PT  Progress: improving  Plan of Care Reviewed With: patient  Outcome Summary: PT eval complete.  PT independent with bed mobility, transfers, and gait with no AD.  Intermittent mild L foot drop and antalgic gait that does not result in LOB or unsteadiness.  Pt plans to DC today home with spouse.  BP continues to be elevated with no reports of headache.      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 She        CT abd/pelv obtained in ED showing mild SBO w/ TP in the terminal ileum. CT chest showing small b/l PE. Pt adamantly refuses NGT insertion at this time. She was admitted to surgery for conservative management.  Started on heparin gtt for PE.  NPO on IVF while awaiting return of bowel function.  Medical oncology was consulted.  Patient prognosis poor given progressive disease.  She will follow up with oncology as outpatient for further chemotherapy.  She was started on malignant SBO protocol while in hospital.  Diet was advanced as tolerated.  Heparin gtt was transitioned to Eliquis. Bowel function returned and pain resolved.  She had hypokalemia which was supplemented with potassium.  On day of discharge, the patient was tolerating diet, ambulating well and pain controlled. She will follow up with Dr. Aguirre, Dr. Zhu and her PCP. 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 She        CT abd/pelv obtained in ED showing mild SBO w/ TP in the terminal ileum. CT chest showing small b/l PE. Pt adamantly refuses NGT insertion at this time. She was admitted to surgery for conservative management.  Started on heparin gtt for PE.  NPO on IVF while awaiting return of bowel function.  Medical oncology was consulted.  Patient prognosis poor given progressive disease.  She will follow up with oncology as outpatient for further chemotherapy.  She was started on malignant SBO protocol while in hospital.  Diet was advanced as tolerated.  Heparin gtt was transitioned to Eliquis. Bowel function returned and pain resolved.  She had hypokalemia which was supplemented with potassium.  On day of discharge, the patient was tolerating diet, ambulating well and pain controlled. Pt will follow up with her Oncologist.

## 2022-06-14 NOTE — PHYSICAL THERAPY INITIAL EVALUATION ADULT - DID THE PATIENT HAVE SURGERY?
s/p paracentesis/yes Glycopyrrolate Counseling:  I discussed with the patient the risks of glycopyrrolate including but not limited to skin rash, drowsiness, dry mouth, difficulty urinating, and blurred vision.

## 2023-04-20 NOTE — ED ADULT TRIAGE NOTE - BMI (KG/M2)
The patient has been examined and the H&P has been reviewed:    I concur with the findings and no changes have occurred since H&P was written.    Surgery risks, benefits and alternative options discussed and understood by patient/family.          Active Hospital Problems    Diagnosis  POA    *Lipoma of neck [D17.0]  Unknown      Resolved Hospital Problems   No resolved problems to display.     
31
Warm

## 2023-10-12 NOTE — ED ADULT NURSE NOTE - NS ED NURSE LEVEL OF CONSCIOUSNESS AFFECT
Patient is asking about having a possible ablation by Dr Sheppard and he is hoping to do this prior to her RT hip surgery.    Please call to discuss.    Thanks Kathleen RAMOS   Calm

## 2023-12-22 NOTE — PROVIDER CONTACT NOTE (OTHER) - ASSESSMENT
delayed awakening/nausea/vomiting
Pt A&Ox4,OOB w/ 1-assist,w/ VS as charted. Pt c/o 6 out of 10 lower quadrant pain,percocet not due until 1804. Pt s/p LLQ abd paracentesis,incision CDI. Pt IV intact,running NS@60ml/hr. Pt diet still NPO.
Pt A&Ox4,OOB w/ 1-assist,w/ VS as charted. Pt c/o mild abdominal pain,denies need for pain meds. Pt NPO pending IR paracentesis. Pt IV intact,running NS@60ml/hr.
see flowsheet

## 2024-10-15 NOTE — ED PROVIDER NOTE - PROGRESS NOTE DETAILS
Name band; Attd:  Received sign out on patient.  Patient CT A/P found to have possible IVC thrombus.  Vascular surgery consulted.  Discussed with radiology will need CT venogram to completely rule out, however based off imaging to date if thrombus present is not occlusive by imaging to date.  Believe it would be safe to space out imaging by 24 hours to avoid nephrotoxcity secondary to double contrast dosing.  Vascular consult pending.  Plan for admission to medicine versus surgery with plan for further imaging tomorrow.  Sam Deras M.D. Attd:  Received sign out on patient.  Patient CT A/P found to have possible IVC thrombus.  Vascular surgery consulted.  Discussed with radiology will need CT venogram to completely rule out, however based off imaging to date if thrombus present is not occlusive by imaging to date.  Believe it would be safe to space out imaging by 24 hours to avoid nephrotoxcity secondary to double contrast dosing.  Vascular consult pending.  Discussed with vascular surgery who agree with above plan.  Will admit to medicine.  Sam Deras M.D. Spoke with on call PA from patient's oncologist, team will see patient tomorrow. States that there was discussion regarding hospice for patient.
